# Patient Record
Sex: MALE | Race: WHITE | NOT HISPANIC OR LATINO | Employment: OTHER | URBAN - METROPOLITAN AREA
[De-identification: names, ages, dates, MRNs, and addresses within clinical notes are randomized per-mention and may not be internally consistent; named-entity substitution may affect disease eponyms.]

---

## 2017-03-20 ENCOUNTER — HOSPITAL ENCOUNTER (OUTPATIENT)
Dept: CARDIOLOGY | Facility: HOSPITAL | Age: 57
Discharge: HOME | End: 2017-03-20
Attending: INTERNAL MEDICINE | Admitting: INTERNAL MEDICINE

## 2017-03-22 ENCOUNTER — HOSPITAL ENCOUNTER (OUTPATIENT)
Dept: OTHER | Facility: HOSPITAL | Age: 57
Discharge: HOME | End: 2017-03-22
Attending: INTERNAL MEDICINE | Admitting: INTERNAL MEDICINE

## 2017-03-22 LAB
ANION GAP SERPL CALC-SCNC: 13.9 MMOL/L (ref 10–20)
APTT BLD: 24.3 SEC (ref 24–31)
BASOPHILS # BLD AUTO: 0 10*3/UL (ref 0–0.2)
BASOPHILS NFR BLD AUTO: 0 % (ref 0–2)
BUN SERPL-MCNC: 24 MG/DL (ref 8–20)
BUN/CREAT SERPL: 20 (ref 6.2–20.3)
CALCIUM SERPL-MCNC: 9.4 MG/DL (ref 8.9–10.3)
CHLORIDE SERPL-SCNC: 105 MMOL/L (ref 101–111)
CHOLEST SERPL-MCNC: 213 MG/DL
CHOLEST/HDLC SERPL: 6.1 {RATIO}
CONV CO2: 25 MMOL/L (ref 22–32)
CONV LDL CHOLESTEROL DIRECT: 133 MG/DL (ref 0–100)
CREAT UR-MCNC: 1.2 MG/DL (ref 0.7–1.2)
DIFFERENTIAL METHOD BLD: (no result)
EOSINOPHIL # BLD AUTO: 0.1 10*3/UL (ref 0–0.3)
EOSINOPHIL # BLD AUTO: 2 % (ref 0–3)
ERYTHROCYTE [DISTWIDTH] IN BLOOD BY AUTOMATED COUNT: 13 % (ref 11.5–14.5)
GLUCOSE SERPL-MCNC: 81 MG/DL (ref 65–99)
HCT VFR BLD AUTO: 40.1 % (ref 40–54)
HDLC SERPL-MCNC: 35 MG/DL
HGB BLD-MCNC: 13.6 G/DL (ref 14–18)
INR PPP: 1
LDLC/HDLC SERPL: 3.8 {RATIO}
LIPID INTERPRETATION: ABNORMAL
LYMPHOCYTES # BLD AUTO: 2.1 10*3/UL (ref 0.8–4.8)
LYMPHOCYTES NFR BLD AUTO: 32 % (ref 18–42)
MCH RBC QN AUTO: 30.5 PG (ref 26–32)
MCHC RBC AUTO-ENTMCNC: 33.9 G/DL (ref 32–36)
MCV RBC AUTO: 90 FL (ref 80–94)
MONOCYTES # BLD AUTO: 0.7 10*3/UL (ref 0.1–1.3)
MONOCYTES NFR BLD AUTO: 11 % (ref 2–11)
NEUTROPHILS # BLD AUTO: 3.5 10*3/UL (ref 2.3–8.6)
NEUTROPHILS NFR BLD AUTO: 55 % (ref 50–75)
NRBC BLD AUTO-RTO: 0 /100{WBCS}
NRBC/RBC NFR BLD MANUAL: 0 10*3/UL
PLATELET # BLD AUTO: 160 10*3/UL (ref 150–450)
PMV BLD AUTO: 8.9 FL (ref 7.4–10.4)
POTASSIUM SERPL-SCNC: 3.9 MMOL/L (ref 3.6–5.1)
PROTHROMBIN TIME: 10.9 SEC (ref 9.6–11.7)
RBC # BLD AUTO: 4.46 10*6/UL (ref 4.6–6)
SODIUM SERPL-SCNC: 140 MMOL/L (ref 136–144)
TRIGL SERPL-MCNC: 161 MG/DL
VLDLC SERPL CALC-MCNC: 45.1 MG/DL
WBC # BLD AUTO: 6.5 10*3/UL (ref 4.5–11.5)

## 2017-06-02 ENCOUNTER — HOSPITAL ENCOUNTER (OUTPATIENT)
Dept: CARDIOLOGY | Facility: HOSPITAL | Age: 57
Discharge: HOME | End: 2017-06-02
Attending: INTERNAL MEDICINE | Admitting: INTERNAL MEDICINE

## 2017-07-27 ENCOUNTER — HOSPITAL ENCOUNTER (OUTPATIENT)
Dept: CARDIAC REHAB | Facility: HOSPITAL | Age: 57
Setting detail: RECURRING SERIES
Discharge: HOME | End: 2017-09-26

## 2017-09-12 ENCOUNTER — HOSPITAL ENCOUNTER (OUTPATIENT)
Dept: CARDIOLOGY | Facility: HOSPITAL | Age: 57
Discharge: HOME | End: 2017-09-12
Attending: INTERNAL MEDICINE | Admitting: INTERNAL MEDICINE

## 2017-10-09 ENCOUNTER — HOSPITAL ENCOUNTER (OUTPATIENT)
Dept: MRI IMAGING | Facility: HOSPITAL | Age: 57
Discharge: HOME | End: 2017-10-09
Attending: FAMILY MEDICINE | Admitting: FAMILY MEDICINE

## 2017-10-30 ENCOUNTER — HOSPITAL ENCOUNTER (OUTPATIENT)
Dept: PAIN MEDICINE | Facility: HOSPITAL | Age: 57
Discharge: HOME | End: 2017-10-30
Attending: PAIN MEDICINE | Admitting: PAIN MEDICINE

## 2017-11-01 ENCOUNTER — HOSPITAL ENCOUNTER (OUTPATIENT)
Dept: GENERAL RADIOLOGY | Facility: HOSPITAL | Age: 57
Discharge: HOME | End: 2017-11-01
Attending: FAMILY MEDICINE | Admitting: FAMILY MEDICINE

## 2017-11-09 ENCOUNTER — HOSPITAL ENCOUNTER (OUTPATIENT)
Dept: MRI IMAGING | Facility: HOSPITAL | Age: 57
Discharge: HOME | End: 2017-11-09
Attending: FAMILY MEDICINE | Admitting: FAMILY MEDICINE

## 2017-11-13 ENCOUNTER — HOSPITAL ENCOUNTER (OUTPATIENT)
Dept: PAIN MEDICINE | Facility: HOSPITAL | Age: 57
Discharge: HOME | End: 2017-11-13
Attending: PAIN MEDICINE | Admitting: PAIN MEDICINE

## 2017-12-06 ENCOUNTER — HOSPITAL ENCOUNTER (OUTPATIENT)
Dept: LAB | Facility: HOSPITAL | Age: 57
Discharge: HOME | End: 2017-12-06
Attending: INTERNAL MEDICINE | Admitting: INTERNAL MEDICINE

## 2017-12-06 LAB
ANION GAP SERPL CALC-SCNC: 10.3 MMOL/L (ref 10–20)
BUN SERPL-MCNC: 21 MG/DL (ref 8–20)
BUN/CREAT SERPL: 17.5 (ref 6.2–20.3)
CALCIUM SERPL-MCNC: 9.3 MG/DL (ref 8.9–10.3)
CHLORIDE SERPL-SCNC: 108 MMOL/L (ref 101–111)
CONV CO2: 25 MMOL/L (ref 22–32)
CREAT UR-MCNC: 1.2 MG/DL (ref 0.7–1.2)
GLUCOSE SERPL-MCNC: 108 MG/DL (ref 65–99)
POTASSIUM SERPL-SCNC: 4.3 MMOL/L (ref 3.6–5.1)
SODIUM SERPL-SCNC: 139 MMOL/L (ref 136–144)

## 2018-02-14 ENCOUNTER — HOSPITAL ENCOUNTER (OUTPATIENT)
Dept: LAB | Facility: HOSPITAL | Age: 58
Discharge: HOME | End: 2018-02-14
Attending: NURSE PRACTITIONER | Admitting: NURSE PRACTITIONER

## 2018-02-14 LAB
ANION GAP SERPL CALC-SCNC: 12.8 MMOL/L (ref 10–20)
BUN SERPL-MCNC: 19 MG/DL (ref 8–20)
BUN/CREAT SERPL: 17.3 (ref 6.2–20.3)
CALCIUM SERPL-MCNC: 9.7 MG/DL (ref 8.9–10.3)
CHLORIDE SERPL-SCNC: 103 MMOL/L (ref 101–111)
CONV CO2: 27 MMOL/L (ref 22–32)
CREAT UR-MCNC: 1.1 MG/DL (ref 0.7–1.2)
GLUCOSE SERPL-MCNC: 127 MG/DL (ref 65–99)
POTASSIUM SERPL-SCNC: 3.8 MMOL/L (ref 3.6–5.1)
SODIUM SERPL-SCNC: 139 MMOL/L (ref 136–144)

## 2018-02-21 ENCOUNTER — HOSPITAL ENCOUNTER (OUTPATIENT)
Dept: LAB | Facility: HOSPITAL | Age: 58
Setting detail: SPECIMEN
Discharge: HOME | End: 2018-02-21
Attending: NURSE PRACTITIONER | Admitting: NURSE PRACTITIONER

## 2018-02-21 LAB
ANION GAP SERPL CALC-SCNC: 10.9 MMOL/L (ref 10–20)
BUN SERPL-MCNC: 16 MG/DL (ref 8–20)
BUN/CREAT SERPL: 12.3 (ref 6.2–20.3)
CALCIUM SERPL-MCNC: 9.4 MG/DL (ref 8.9–10.3)
CHLORIDE SERPL-SCNC: 104 MMOL/L (ref 101–111)
CONV CO2: 28 MMOL/L (ref 22–32)
CREAT UR-MCNC: 1.3 MG/DL (ref 0.7–1.2)
GLUCOSE SERPL-MCNC: 159 MG/DL (ref 65–99)
POTASSIUM SERPL-SCNC: 3.9 MMOL/L (ref 3.6–5.1)
SODIUM SERPL-SCNC: 139 MMOL/L (ref 136–144)

## 2018-03-07 ENCOUNTER — HOSPITAL ENCOUNTER (OUTPATIENT)
Dept: LAB | Facility: HOSPITAL | Age: 58
Setting detail: SPECIMEN
Discharge: HOME | End: 2018-03-07
Attending: NURSE PRACTITIONER | Admitting: NURSE PRACTITIONER

## 2018-03-07 LAB
ANION GAP SERPL CALC-SCNC: 9.7 MMOL/L (ref 10–20)
BUN SERPL-MCNC: 24 MG/DL (ref 8–20)
BUN/CREAT SERPL: 20 (ref 6.2–20.3)
CALCIUM SERPL-MCNC: 9.2 MG/DL (ref 8.9–10.3)
CHLORIDE SERPL-SCNC: 101 MMOL/L (ref 101–111)
CONV CO2: 29 MMOL/L (ref 22–32)
CREAT UR-MCNC: 1.2 MG/DL (ref 0.7–1.2)
GLUCOSE SERPL-MCNC: 137 MG/DL (ref 65–99)
POTASSIUM SERPL-SCNC: 3.7 MMOL/L (ref 3.6–5.1)
SODIUM SERPL-SCNC: 136 MMOL/L (ref 136–144)

## 2018-04-05 ENCOUNTER — HOSPITAL ENCOUNTER (OUTPATIENT)
Dept: LAB | Facility: HOSPITAL | Age: 58
Setting detail: SPECIMEN
Discharge: HOME | End: 2018-04-05
Attending: NURSE PRACTITIONER | Admitting: NURSE PRACTITIONER

## 2018-04-05 LAB
ANION GAP SERPL CALC-SCNC: 13.2 MMOL/L (ref 10–20)
BUN SERPL-MCNC: 15 MG/DL (ref 8–20)
BUN/CREAT SERPL: 15 (ref 6.2–20.3)
CALCIUM SERPL-MCNC: 9.4 MG/DL (ref 8.9–10.3)
CHLORIDE SERPL-SCNC: 102 MMOL/L (ref 101–111)
CONV CO2: 25 MMOL/L (ref 22–32)
CREAT UR-MCNC: 1 MG/DL (ref 0.7–1.2)
GLUCOSE SERPL-MCNC: 105 MG/DL (ref 65–99)
POTASSIUM SERPL-SCNC: 4.2 MMOL/L (ref 3.6–5.1)
SODIUM SERPL-SCNC: 136 MMOL/L (ref 136–144)

## 2018-04-18 ENCOUNTER — HOSPITAL ENCOUNTER (OUTPATIENT)
Dept: FAMILY MEDICINE CLINIC | Facility: CLINIC | Age: 58
Setting detail: SPECIMEN
Discharge: HOME | End: 2018-04-18
Attending: FAMILY MEDICINE | Admitting: FAMILY MEDICINE

## 2018-04-18 LAB
ALBUMIN SERPL-MCNC: 4.3 G/DL (ref 3.5–4.8)
ALBUMIN/GLOB SERPL: 1.7 {RATIO} (ref 1–1.7)
ALP SERPL-CCNC: 50 IU/L (ref 32–91)
ALT SERPL-CCNC: 21 IU/L (ref 17–63)
AMYLASE SERPL-CCNC: 46 U/L (ref 36–128)
ANION GAP SERPL CALC-SCNC: 11.8 MMOL/L (ref 10–20)
AST SERPL-CCNC: 28 IU/L (ref 15–41)
BASOPHILS # BLD AUTO: 0 10*3/UL (ref 0–0.2)
BASOPHILS NFR BLD AUTO: 1 % (ref 0–2)
BILIRUB SERPL-MCNC: 0.6 MG/DL (ref 0.3–1.2)
BILIRUB UR QL STRIP: NEGATIVE MG/DL
BUN SERPL-MCNC: 18 MG/DL (ref 8–20)
BUN/CREAT SERPL: 15 (ref 6.2–20.3)
CALCIUM SERPL-MCNC: 9 MG/DL (ref 8.9–10.3)
CASTS URNS QL MICRO: NORMAL /[LPF]
CHLORIDE SERPL-SCNC: 104 MMOL/L (ref 101–111)
CHOLEST SERPL-MCNC: 210 MG/DL
CHOLEST/HDLC SERPL: 6.6 {RATIO}
COLOR UR: YELLOW
CONV BACTERIA IN URINE MICRO: NEGATIVE
CONV CLARITY OF URINE: CLEAR
CONV CO2: 25 MMOL/L (ref 22–32)
CONV HYALINE CASTS IN URINE MICRO: 2 /[LPF] (ref 0–5)
CONV LDL CHOLESTEROL DIRECT: 139 MG/DL (ref 0–100)
CONV PROTEIN IN URINE BY AUTOMATED TEST STRIP: NEGATIVE MG/DL
CONV SMALL ROUND CELLS: NORMAL /[HPF]
CONV TOTAL PROTEIN: 6.9 G/DL (ref 6.1–7.9)
CONV UROBILINOGEN IN URINE BY AUTOMATED TEST STRIP: 0.2 MG/DL
CREAT UR-MCNC: 1.2 MG/DL (ref 0.7–1.2)
CULTURE INDICATED?: NORMAL
DIFFERENTIAL METHOD BLD: (no result)
EOSINOPHIL # BLD AUTO: 0.1 10*3/UL (ref 0–0.3)
EOSINOPHIL # BLD AUTO: 2 % (ref 0–3)
ERYTHROCYTE [DISTWIDTH] IN BLOOD BY AUTOMATED COUNT: 12.9 % (ref 11.5–14.5)
ERYTHROCYTE [SEDIMENTATION RATE] IN BLOOD BY WESTERGREN METHOD: 13 MM/HR (ref 0–20)
GLOBULIN UR ELPH-MCNC: 2.6 G/DL (ref 2.5–3.8)
GLUCOSE SERPL-MCNC: 118 MG/DL (ref 65–99)
GLUCOSE UR QL: NEGATIVE MG/DL
HCT VFR BLD AUTO: 39.5 % (ref 40–54)
HDLC SERPL-MCNC: 32 MG/DL
HGB BLD-MCNC: 13.4 G/DL (ref 14–18)
HGB UR QL STRIP: NEGATIVE
KETONES UR QL STRIP: NEGATIVE MG/DL
LDLC/HDLC SERPL: 4.4 {RATIO}
LEUKOCYTE ESTERASE UR QL STRIP: NEGATIVE
LIPASE SERPL-CCNC: 27 U/L (ref 22–51)
LIPID INTERPRETATION: ABNORMAL
LYMPHOCYTES # BLD AUTO: 2 10*3/UL (ref 0.8–4.8)
LYMPHOCYTES NFR BLD AUTO: 35 % (ref 18–42)
MCH RBC QN AUTO: 30.7 PG (ref 26–32)
MCHC RBC AUTO-ENTMCNC: 33.8 G/DL (ref 32–36)
MCV RBC AUTO: 90.7 FL (ref 80–94)
MONOCYTES # BLD AUTO: 0.8 10*3/UL (ref 0.1–1.3)
MONOCYTES NFR BLD AUTO: 14 % (ref 2–11)
NEUTROPHILS # BLD AUTO: 2.7 10*3/UL (ref 2.3–8.6)
NEUTROPHILS NFR BLD AUTO: 48 % (ref 50–75)
NITRITE UR QL STRIP: NEGATIVE
NRBC BLD AUTO-RTO: 0 /100{WBCS}
NRBC/RBC NFR BLD MANUAL: 0 10*3/UL
PH UR STRIP.AUTO: 5.5 [PH] (ref 4.5–8)
PLATELET # BLD AUTO: 189 10*3/UL (ref 150–450)
PMV BLD AUTO: 8.6 FL (ref 7.4–10.4)
POTASSIUM SERPL-SCNC: 3.8 MMOL/L (ref 3.6–5.1)
RBC # BLD AUTO: 4.36 10*6/UL (ref 4.6–6)
RBC #/AREA URNS HPF: 0 /[HPF] (ref 0–3)
SODIUM SERPL-SCNC: 137 MMOL/L (ref 136–144)
SP GR UR: 1.02 (ref 1–1.03)
SPERM URNS QL MICRO: NORMAL /[HPF]
SQUAMOUS SPT QL MICRO: 0 /[HPF] (ref 0–5)
TRIGL SERPL-MCNC: 151 MG/DL
UNIDENT CRYS URNS QL MICRO: NORMAL /[HPF]
VLDLC SERPL CALC-MCNC: 39.1 MG/DL
WBC # BLD AUTO: 5.7 10*3/UL (ref 4.5–11.5)
WBC #/AREA URNS HPF: 0 /[HPF] (ref 0–5)
YEAST SPEC QL WET PREP: NORMAL /[HPF]

## 2018-04-21 ENCOUNTER — HOSPITAL ENCOUNTER (OUTPATIENT)
Dept: ULTRASOUND IMAGING | Facility: HOSPITAL | Age: 58
Discharge: HOME | End: 2018-04-21
Attending: FAMILY MEDICINE | Admitting: FAMILY MEDICINE

## 2018-06-07 ENCOUNTER — HOSPITAL ENCOUNTER (OUTPATIENT)
Dept: CARDIOLOGY | Facility: HOSPITAL | Age: 58
Discharge: HOME | End: 2018-06-07
Attending: INTERNAL MEDICINE | Admitting: INTERNAL MEDICINE

## 2018-06-07 LAB
ANION GAP SERPL CALC-SCNC: 12.1 MMOL/L (ref 10–20)
BUN SERPL-MCNC: 20 MG/DL (ref 8–20)
BUN/CREAT SERPL: 15.4 (ref 6.2–20.3)
CALCIUM SERPL-MCNC: 9.4 MG/DL (ref 8.9–10.3)
CHLORIDE SERPL-SCNC: 104 MMOL/L (ref 101–111)
CONV CO2: 26 MMOL/L (ref 22–32)
CREAT UR-MCNC: 1.3 MG/DL (ref 0.7–1.2)
GLUCOSE SERPL-MCNC: 101 MG/DL (ref 65–99)
POTASSIUM SERPL-SCNC: 4.1 MMOL/L (ref 3.6–5.1)
SODIUM SERPL-SCNC: 138 MMOL/L (ref 136–144)

## 2018-06-11 ENCOUNTER — HOSPITAL ENCOUNTER (OUTPATIENT)
Dept: LAB | Facility: HOSPITAL | Age: 58
Discharge: HOME | End: 2018-06-11
Attending: INTERNAL MEDICINE | Admitting: INTERNAL MEDICINE

## 2018-06-11 LAB
ALBUMIN SERPL-MCNC: 4 G/DL (ref 3.5–4.8)
ALBUMIN/GLOB SERPL: 1.4 {RATIO} (ref 1–1.7)
ALP SERPL-CCNC: 55 IU/L (ref 32–91)
ALT SERPL-CCNC: 15 IU/L (ref 17–63)
ANION GAP SERPL CALC-SCNC: 12.9 MMOL/L (ref 10–20)
AST SERPL-CCNC: 19 IU/L (ref 15–41)
BILIRUB SERPL-MCNC: 0.5 MG/DL (ref 0.3–1.2)
BUN SERPL-MCNC: 12 MG/DL (ref 8–20)
BUN/CREAT SERPL: 10.9 (ref 6.2–20.3)
CALCIUM SERPL-MCNC: 9.3 MG/DL (ref 8.9–10.3)
CHLORIDE SERPL-SCNC: 100 MMOL/L (ref 101–111)
CK SERPL-CCNC: 40 IU/L (ref 49–397)
CONV CO2: 28 MMOL/L (ref 22–32)
CONV TOTAL PROTEIN: 6.9 G/DL (ref 6.1–7.9)
CREAT UR-MCNC: 1.1 MG/DL (ref 0.7–1.2)
GLOBULIN UR ELPH-MCNC: 2.9 G/DL (ref 2.5–3.8)
GLUCOSE SERPL-MCNC: 106 MG/DL (ref 65–99)
MAGNESIUM SERPL-MCNC: 1.9 MG/DL (ref 1.8–2.5)
POTASSIUM SERPL-SCNC: 3.9 MMOL/L (ref 3.6–5.1)
SODIUM SERPL-SCNC: 137 MMOL/L (ref 136–144)

## 2018-08-01 ENCOUNTER — HOSPITAL ENCOUNTER (OUTPATIENT)
Dept: LAB | Facility: HOSPITAL | Age: 58
Discharge: HOME | End: 2018-08-01
Attending: INTERNAL MEDICINE | Admitting: INTERNAL MEDICINE

## 2018-08-01 LAB
ANION GAP SERPL CALC-SCNC: 11.9 MMOL/L (ref 10–20)
BUN SERPL-MCNC: 14 MG/DL (ref 8–20)
BUN/CREAT SERPL: 12.7 (ref 6.2–20.3)
CALCIUM SERPL-MCNC: 9.1 MG/DL (ref 8.9–10.3)
CHLORIDE SERPL-SCNC: 104 MMOL/L (ref 101–111)
CONV CO2: 28 MMOL/L (ref 22–32)
CREAT UR-MCNC: 1.1 MG/DL (ref 0.7–1.2)
GLUCOSE SERPL-MCNC: ABNORMAL MG/DL
GLUCOSE SERPL-MCNC: ABNORMAL MG/DL (ref 65–99)
POTASSIUM SERPL-SCNC: 3.9 MMOL/L (ref 3.6–5.1)
SODIUM SERPL-SCNC: 140 MMOL/L (ref 136–144)

## 2018-08-16 ENCOUNTER — HOSPITAL ENCOUNTER (OUTPATIENT)
Dept: LAB | Facility: HOSPITAL | Age: 58
Setting detail: SPECIMEN
Discharge: HOME | End: 2018-08-16
Attending: INTERNAL MEDICINE | Admitting: INTERNAL MEDICINE

## 2018-08-16 LAB
ANION GAP SERPL CALC-SCNC: 11.7 MMOL/L (ref 10–20)
BUN SERPL-MCNC: 16 MG/DL (ref 8–20)
BUN/CREAT SERPL: 13.3 (ref 6.2–20.3)
CALCIUM SERPL-MCNC: 9.1 MG/DL (ref 8.9–10.3)
CHLORIDE SERPL-SCNC: 103 MMOL/L (ref 101–111)
CONV CO2: 25 MMOL/L (ref 22–32)
CREAT UR-MCNC: 1.2 MG/DL (ref 0.7–1.2)
GLUCOSE SERPL-MCNC: 109 MG/DL (ref 65–99)
POTASSIUM SERPL-SCNC: 3.7 MMOL/L (ref 3.6–5.1)
SODIUM SERPL-SCNC: 136 MMOL/L (ref 136–144)

## 2018-08-23 ENCOUNTER — HOSPITAL ENCOUNTER (OUTPATIENT)
Dept: LAB | Facility: HOSPITAL | Age: 58
Setting detail: SPECIMEN
Discharge: HOME | End: 2018-08-23
Attending: INTERNAL MEDICINE | Admitting: INTERNAL MEDICINE

## 2018-08-23 LAB
ANION GAP SERPL CALC-SCNC: 8.8 MMOL/L (ref 10–20)
BUN SERPL-MCNC: 11 MG/DL (ref 8–20)
BUN/CREAT SERPL: 11 (ref 6.2–20.3)
CALCIUM SERPL-MCNC: 9 MG/DL (ref 8.9–10.3)
CHLORIDE SERPL-SCNC: 106 MMOL/L (ref 101–111)
CONV CO2: 26 MMOL/L (ref 22–32)
CREAT UR-MCNC: 1 MG/DL (ref 0.7–1.2)
GLUCOSE SERPL-MCNC: 117 MG/DL (ref 65–99)
POTASSIUM SERPL-SCNC: 3.8 MMOL/L (ref 3.6–5.1)
SODIUM SERPL-SCNC: 137 MMOL/L (ref 136–144)

## 2018-12-06 ENCOUNTER — HOSPITAL ENCOUNTER (OUTPATIENT)
Dept: CARDIOLOGY | Facility: HOSPITAL | Age: 58
Discharge: HOME | End: 2018-12-06
Attending: INTERNAL MEDICINE | Admitting: INTERNAL MEDICINE

## 2019-02-08 ENCOUNTER — HOSPITAL ENCOUNTER (OUTPATIENT)
Dept: NUCLEAR MEDICINE | Facility: HOSPITAL | Age: 59
Discharge: HOME | End: 2019-02-08
Attending: FAMILY MEDICINE | Admitting: FAMILY MEDICINE

## 2019-04-08 ENCOUNTER — HOSPITAL ENCOUNTER (OUTPATIENT)
Dept: FAMILY MEDICINE CLINIC | Facility: CLINIC | Age: 59
Setting detail: SPECIMEN
Discharge: HOME | End: 2019-04-08
Attending: FAMILY MEDICINE | Admitting: FAMILY MEDICINE

## 2019-04-08 LAB
ALBUMIN SERPL-MCNC: 4.3 G/DL (ref 3.5–4.8)
ALBUMIN/GLOB SERPL: 1.6 {RATIO} (ref 1–1.7)
ALP SERPL-CCNC: 44 IU/L (ref 32–91)
ALT SERPL-CCNC: 26 IU/L (ref 17–63)
ANION GAP SERPL CALC-SCNC: 17 MMOL/L (ref 10–20)
AST SERPL-CCNC: 28 IU/L (ref 15–41)
BILIRUB SERPL-MCNC: 0.9 MG/DL (ref 0.3–1.2)
BILIRUB UR QL STRIP: NEGATIVE MG/DL
BUN SERPL-MCNC: 16 MG/DL (ref 8–20)
BUN/CREAT SERPL: 14.5 (ref 6.2–20.3)
CALCIUM SERPL-MCNC: 9.4 MG/DL (ref 8.9–10.3)
CASTS URNS QL MICRO: NORMAL /[LPF]
CHLORIDE SERPL-SCNC: 105 MMOL/L (ref 101–111)
COLOR UR: YELLOW
CONV BACTERIA IN URINE MICRO: NEGATIVE
CONV CLARITY OF URINE: CLEAR
CONV CO2: 22 MMOL/L (ref 22–32)
CONV HYALINE CASTS IN URINE MICRO: 0 /[LPF] (ref 0–5)
CONV PROTEIN IN URINE BY AUTOMATED TEST STRIP: NEGATIVE MG/DL
CONV SMALL ROUND CELLS: NORMAL /[HPF]
CONV TOTAL PROTEIN: 7 G/DL (ref 6.1–7.9)
CONV UROBILINOGEN IN URINE BY AUTOMATED TEST STRIP: 0.2 MG/DL
CREAT UR-MCNC: 1.1 MG/DL (ref 0.7–1.2)
CULTURE INDICATED?: NORMAL
GLOBULIN UR ELPH-MCNC: 2.7 G/DL (ref 2.5–3.8)
GLUCOSE SERPL-MCNC: 102 MG/DL (ref 65–99)
GLUCOSE UR QL: NEGATIVE MG/DL
HGB UR QL STRIP: NEGATIVE
KETONES UR QL STRIP: NEGATIVE MG/DL
LEUKOCYTE ESTERASE UR QL STRIP: NEGATIVE
MAGNESIUM SERPL-MCNC: 2.1 MG/DL (ref 1.8–2.5)
NITRITE UR QL STRIP: NEGATIVE
PH UR STRIP.AUTO: 6.5 [PH] (ref 4.5–8)
POTASSIUM SERPL-SCNC: 4 MMOL/L (ref 3.6–5.1)
RBC #/AREA URNS HPF: 0 /[HPF] (ref 0–3)
SODIUM SERPL-SCNC: 140 MMOL/L (ref 136–144)
SP GR UR: 1.01 (ref 1–1.03)
SPERM URNS QL MICRO: NORMAL /[HPF]
SQUAMOUS SPT QL MICRO: 0 /[HPF] (ref 0–5)
UNIDENT CRYS URNS QL MICRO: NORMAL /[HPF]
WBC #/AREA URNS HPF: 0 /[HPF] (ref 0–5)
YEAST SPEC QL WET PREP: NORMAL /[HPF]

## 2019-10-02 ENCOUNTER — FLU SHOT (OUTPATIENT)
Dept: FAMILY MEDICINE CLINIC | Facility: CLINIC | Age: 59
End: 2019-10-02

## 2019-10-02 DIAGNOSIS — Z23 IMMUNIZATION DUE: Primary | ICD-10-CM

## 2019-10-02 PROCEDURE — G0008 ADMIN INFLUENZA VIRUS VAC: HCPCS | Performed by: FAMILY MEDICINE

## 2019-10-02 PROCEDURE — 90674 CCIIV4 VAC NO PRSV 0.5 ML IM: CPT | Performed by: FAMILY MEDICINE

## 2019-11-21 ENCOUNTER — APPOINTMENT (OUTPATIENT)
Dept: GENERAL RADIOLOGY | Facility: HOSPITAL | Age: 59
End: 2019-11-21

## 2019-11-21 ENCOUNTER — HOSPITAL ENCOUNTER (EMERGENCY)
Facility: HOSPITAL | Age: 59
Discharge: HOME | End: 2019-11-21
Attending: EMERGENCY MEDICINE | Admitting: EMERGENCY MEDICINE

## 2019-11-21 VITALS
RESPIRATION RATE: 17 BRPM | TEMPERATURE: 98.6 F | BODY MASS INDEX: 32.59 KG/M2 | HEIGHT: 69 IN | SYSTOLIC BLOOD PRESSURE: 107 MMHG | HEART RATE: 66 BPM | DIASTOLIC BLOOD PRESSURE: 60 MMHG | OXYGEN SATURATION: 98 % | WEIGHT: 220.02 LBS

## 2019-11-21 DIAGNOSIS — M70.32 BURSITIS OF LEFT ELBOW, UNSPECIFIED BURSA: Primary | ICD-10-CM

## 2019-11-21 LAB
ANION GAP SERPL CALCULATED.3IONS-SCNC: 9 MMOL/L (ref 5–15)
BASOPHILS # BLD AUTO: 0 10*3/MM3 (ref 0–0.2)
BASOPHILS NFR BLD AUTO: 0.4 % (ref 0–1.5)
BUN BLD-MCNC: 21 MG/DL (ref 6–20)
BUN/CREAT SERPL: 16.3 (ref 7–25)
CALCIUM SPEC-SCNC: 9 MG/DL (ref 8.6–10.5)
CHLORIDE SERPL-SCNC: 106 MMOL/L (ref 98–107)
CO2 SERPL-SCNC: 27 MMOL/L (ref 22–29)
CREAT BLD-MCNC: 1.29 MG/DL (ref 0.76–1.27)
DEPRECATED RDW RBC AUTO: 40.7 FL (ref 37–54)
EOSINOPHIL # BLD AUTO: 0.1 10*3/MM3 (ref 0–0.4)
EOSINOPHIL NFR BLD AUTO: 1 % (ref 0.3–6.2)
ERYTHROCYTE [DISTWIDTH] IN BLOOD BY AUTOMATED COUNT: 12.6 % (ref 12.3–15.4)
ERYTHROCYTE [SEDIMENTATION RATE] IN BLOOD: 49 MM/HR (ref 0–20)
GFR SERPL CREATININE-BSD FRML MDRD: 57 ML/MIN/1.73
GLUCOSE BLD-MCNC: 103 MG/DL (ref 65–99)
HCT VFR BLD AUTO: 33.3 % (ref 37.5–51)
HGB BLD-MCNC: 11.8 G/DL (ref 13–17.7)
LYMPHOCYTES # BLD AUTO: 1.7 10*3/MM3 (ref 0.7–3.1)
LYMPHOCYTES NFR BLD AUTO: 26.5 % (ref 19.6–45.3)
MCH RBC QN AUTO: 32.3 PG (ref 26.6–33)
MCHC RBC AUTO-ENTMCNC: 35.5 G/DL (ref 31.5–35.7)
MCV RBC AUTO: 90.9 FL (ref 79–97)
MONOCYTES # BLD AUTO: 0.9 10*3/MM3 (ref 0.1–0.9)
MONOCYTES NFR BLD AUTO: 13.2 % (ref 5–12)
NEUTROPHILS # BLD AUTO: 3.8 10*3/MM3 (ref 1.7–7)
NEUTROPHILS NFR BLD AUTO: 58.9 % (ref 42.7–76)
NRBC BLD AUTO-RTO: 0.2 /100 WBC (ref 0–0.2)
PLATELET # BLD AUTO: 170 10*3/MM3 (ref 140–450)
PMV BLD AUTO: 7.7 FL (ref 6–12)
POTASSIUM BLD-SCNC: 4.1 MMOL/L (ref 3.5–5.2)
RBC # BLD AUTO: 3.66 10*6/MM3 (ref 4.14–5.8)
SODIUM BLD-SCNC: 142 MMOL/L (ref 136–145)
WBC NRBC COR # BLD: 6.5 10*3/MM3 (ref 3.4–10.8)

## 2019-11-21 PROCEDURE — 85025 COMPLETE CBC W/AUTO DIFF WBC: CPT | Performed by: EMERGENCY MEDICINE

## 2019-11-21 PROCEDURE — 80048 BASIC METABOLIC PNL TOTAL CA: CPT | Performed by: EMERGENCY MEDICINE

## 2019-11-21 PROCEDURE — 85652 RBC SED RATE AUTOMATED: CPT | Performed by: EMERGENCY MEDICINE

## 2019-11-21 PROCEDURE — 63710000001 PREDNISONE PER 5 MG: Performed by: EMERGENCY MEDICINE

## 2019-11-21 PROCEDURE — 73070 X-RAY EXAM OF ELBOW: CPT

## 2019-11-21 PROCEDURE — 99283 EMERGENCY DEPT VISIT LOW MDM: CPT

## 2019-11-21 RX ORDER — METHYLPREDNISOLONE 4 MG/1
TABLET ORAL
Qty: 1 EACH | Refills: 0 | Status: SHIPPED | OUTPATIENT
Start: 2019-11-21 | End: 2019-12-12

## 2019-11-21 RX ORDER — CEPHALEXIN 500 MG/1
500 CAPSULE ORAL 4 TIMES DAILY
Qty: 28 CAPSULE | Refills: 0 | Status: SHIPPED | OUTPATIENT
Start: 2019-11-21 | End: 2019-12-12

## 2019-11-21 RX ADMIN — PREDNISONE 60 MG: 10 TABLET ORAL at 19:24

## 2019-11-22 NOTE — ED PROVIDER NOTES
Subjective   History of Present Illness  59-year-old presents with complaints of pain to her left elbow.  He has been having some pain for about 1 to 2 weeks.  He has had no fever.  He had been doing yard work prior to his elbow hurting.  He has increased pain with movement.  Review of Systems  Negative for fever chest pain shortness of breath diabetes  No past medical history on file.  Cardiomyopathy, transposition of great vessels, hypertension  Allergies   Allergen Reactions   • Hydrocodone Urinary Retention       No past surgical history on file.    No family history on file.    Social History     Socioeconomic History   • Marital status:      Spouse name: Not on file   • Number of children: Not on file   • Years of education: Not on file   • Highest education level: Not on file     Medications have included Entresto Lasix Coreg      Objective   Physical Exam  59-year-old male awake alert.  Generally well-developed well-nourished.  Examining the left arm he has warmth tenderness over posterior elbow.  He does not have significant increased pain with pronation supination.  He does have increased pain with flexion extension.  He is neurovascular intact distally.  Chest clear equal breath sounds.  Cardiovascular regular rate and rhythm.  He has no complaints of other joint pain.  Procedures           ED Course        Results for orders placed or performed during the hospital encounter of 11/21/19   Basic Metabolic Panel   Result Value Ref Range    Glucose 103 (H) 65 - 99 mg/dL    BUN 21 (H) 6 - 20 mg/dL    Creatinine 1.29 (H) 0.76 - 1.27 mg/dL    Sodium 142 136 - 145 mmol/L    Potassium 4.1 3.5 - 5.2 mmol/L    Chloride 106 98 - 107 mmol/L    CO2 27.0 22.0 - 29.0 mmol/L    Calcium 9.0 8.6 - 10.5 mg/dL    eGFR Non African Amer 57 (L) >60 mL/min/1.73    BUN/Creatinine Ratio 16.3 7.0 - 25.0    Anion Gap 9.0 5.0 - 15.0 mmol/L   Sedimentation Rate   Result Value Ref Range    Sed Rate 49 (H) 0 - 20 mm/hr   CBC Auto  "Differential   Result Value Ref Range    WBC 6.50 3.40 - 10.80 10*3/mm3    RBC 3.66 (L) 4.14 - 5.80 10*6/mm3    Hemoglobin 11.8 (L) 13.0 - 17.7 g/dL    Hematocrit 33.3 (L) 37.5 - 51.0 %    MCV 90.9 79.0 - 97.0 fL    MCH 32.3 26.6 - 33.0 pg    MCHC 35.5 31.5 - 35.7 g/dL    RDW 12.6 12.3 - 15.4 %    RDW-SD 40.7 37.0 - 54.0 fl    MPV 7.7 6.0 - 12.0 fL    Platelets 170 140 - 450 10*3/mm3    Neutrophil % 58.9 42.7 - 76.0 %    Lymphocyte % 26.5 19.6 - 45.3 %    Monocyte % 13.2 (H) 5.0 - 12.0 %    Eosinophil % 1.0 0.3 - 6.2 %    Basophil % 0.4 0.0 - 1.5 %    Neutrophils, Absolute 3.80 1.70 - 7.00 10*3/mm3    Lymphocytes, Absolute 1.70 0.70 - 3.10 10*3/mm3    Monocytes, Absolute 0.90 0.10 - 0.90 10*3/mm3    Eosinophils, Absolute 0.10 0.00 - 0.40 10*3/mm3    Basophils, Absolute 0.00 0.00 - 0.20 10*3/mm3    nRBC 0.2 0.0 - 0.2 /100 WBC     Xr Elbow 2 View Left    Result Date: 11/21/2019  1. Soft tissue swelling, mainly posteriorly and medially. 2. Proximal ulnar spurring.  Electronically Signed By-Mary Lou Alcantara On:11/21/2019 7:24 PM This report was finalized on [id number] by  Mary Lou Alcantara, .    Medications   predniSONE (DELTASONE) tablet 60 mg (60 mg Oral Given 11/21/19 1924)     /88 (BP Location: Left arm, Patient Position: Sitting)   Pulse 72   Temp 98.5 °F (36.9 °C) (Oral)   Resp 14   Ht 175.3 cm (69\")   Wt 99.8 kg (220 lb 0.3 oz)   SpO2 98%   BMI 32.49 kg/m²             [facility]  Chart review:   Comorbidity: As per past history  Differential: Cellulitis, bursitis, tendinitis  My EKG interpretation:   Lab: Normal white count and differential, monocytes 13%, basic metabolic panel glucose 103, sed rate elevated 49  Radiology: X-ray of left elbow.  Soft tissue swelling without bony abnormality  Discussion/treatment: She was given dose of prednisone.  Findings were discussed with him and wife.  This appears most consistent with a bursitis.  I do not feel fluid to try to draw off at this time.  He was discharged.  He " was placed on Medrol Dosepak.  He was placed on Keflex.  Advised to follow-up with PMD or orthopedist for recheck 2 to 3 days if not improved return new or worsening symptoms    Final diagnoses:   Bursitis of left elbow, unspecified bursa              Randell Joe MD  11/21/19 2032

## 2019-12-11 PROBLEM — R94.39 ABNORMAL CARDIOVASCULAR STRESS TEST: Status: ACTIVE | Noted: 2017-03-21

## 2019-12-11 PROBLEM — Q24.9 CONGENITAL HEART DISEASE: Status: ACTIVE | Noted: 2017-03-02

## 2019-12-11 PROBLEM — I42.9 CARDIOMYOPATHY (HCC): Status: ACTIVE | Noted: 2019-12-11

## 2019-12-11 PROBLEM — I10 HYPERTENSION: Status: ACTIVE | Noted: 2017-03-02

## 2019-12-11 RX ORDER — CARVEDILOL 6.25 MG/1
6.25 TABLET ORAL DAILY
COMMUNITY
Start: 2019-09-07

## 2019-12-11 RX ORDER — FUROSEMIDE 40 MG/1
40 TABLET ORAL DAILY
COMMUNITY
Start: 2019-09-07 | End: 2022-04-26

## 2019-12-11 RX ORDER — ASPIRIN 81 MG/1
81 TABLET ORAL DAILY
COMMUNITY
Start: 2017-12-04

## 2019-12-11 RX ORDER — SACUBITRIL AND VALSARTAN 49; 51 MG/1; MG/1
1 TABLET, FILM COATED ORAL 2 TIMES DAILY
COMMUNITY
Start: 2019-09-09

## 2019-12-12 ENCOUNTER — OFFICE VISIT (OUTPATIENT)
Dept: CARDIOLOGY | Facility: CLINIC | Age: 59
End: 2019-12-12

## 2019-12-12 VITALS
DIASTOLIC BLOOD PRESSURE: 78 MMHG | WEIGHT: 216 LBS | HEART RATE: 62 BPM | OXYGEN SATURATION: 96 % | BODY MASS INDEX: 31.99 KG/M2 | HEIGHT: 69 IN | SYSTOLIC BLOOD PRESSURE: 108 MMHG

## 2019-12-12 DIAGNOSIS — I50.22 CHRONIC SYSTOLIC CONGESTIVE HEART FAILURE (HCC): Primary | ICD-10-CM

## 2019-12-12 DIAGNOSIS — I42.9 CARDIOMYOPATHY, UNSPECIFIED TYPE (HCC): ICD-10-CM

## 2019-12-12 DIAGNOSIS — I10 ESSENTIAL HYPERTENSION: ICD-10-CM

## 2019-12-12 DIAGNOSIS — Q24.9 CONGENITAL HEART DISEASE: ICD-10-CM

## 2019-12-12 DIAGNOSIS — E78.00 PURE HYPERCHOLESTEROLEMIA: ICD-10-CM

## 2019-12-12 PROCEDURE — 99214 OFFICE O/P EST MOD 30 MIN: CPT | Performed by: INTERNAL MEDICINE

## 2019-12-12 NOTE — PROGRESS NOTES
"    Subjective:     Encounter Date:12/12/2019      Patient ID: Jens Jean-Baptiste is a 59 y.o. male.    Chief Complaint:  History of Present Illness 59-year-old white male with history of congenital heart disease history of cardiomyopathy with congestive heart failure hypertension hyperlipidemia presents to my office for follow-up.  Patient is currently stable without any symptoms of chest pain but has some shortness of breath with exertion.  No complaints of any PND orthopnea.  No palpitations dizziness syncope or swelling of the feet.  Has been taking his medicines regularly.  He has been followed by the heart failure specialist and being worked up for heart transplant also.    The following portions of the patient's history were reviewed and updated as appropriate: allergies, current medications, past family history, past medical history, past social history, past surgical history and problem list.  Past Medical History:   Diagnosis Date   • Cardiomyopathy (CMS/HCC)    • Hyperlipidemia    • Hypertension      Past Surgical History:   Procedure Laterality Date   • CARDIAC CATHETERIZATION  2017     /78   Pulse 62   Ht 175.3 cm (69\")   Wt 98 kg (216 lb)   SpO2 96%   BMI 31.90 kg/m²   History reviewed. No pertinent family history.    Current Outpatient Medications:   •  aspirin (ROHITH ASPIRIN EC LOW DOSE) 81 MG EC tablet, ROHITH ASPIRIN EC LOW DOSE 81 MG TBEC, Disp: , Rfl:   •  carvedilol (COREG) 6.25 MG tablet, , Disp: , Rfl:   •  ENTRESTO 49-51 MG tablet, , Disp: , Rfl:   •  furosemide (LASIX) 40 MG tablet, , Disp: , Rfl:   Allergies   Allergen Reactions   • Hydrocodone Urinary Retention     Social History     Socioeconomic History   • Marital status:      Spouse name: Not on file   • Number of children: Not on file   • Years of education: Not on file   • Highest education level: Not on file   Tobacco Use   • Smoking status: Never Smoker   • Smokeless tobacco: Never Used     Review of Systems "   Constitution: Positive for malaise/fatigue. Negative for fever.   HENT: Negative for ear pain and nosebleeds.    Eyes: Negative for blurred vision and double vision.   Cardiovascular: Negative for chest pain, dyspnea on exertion, leg swelling and palpitations.   Respiratory: Positive for shortness of breath. Negative for cough.    Skin: Negative for rash.   Musculoskeletal: Negative for joint pain.   Gastrointestinal: Negative for abdominal pain, nausea and vomiting.   Neurological: Negative for focal weakness, headaches, light-headedness and numbness.   Psychiatric/Behavioral: Negative for depression. The patient is not nervous/anxious.    All other systems reviewed and are negative.             Objective:     Physical Exam   Constitutional: He appears well-developed and well-nourished.   HENT:   Head: Normocephalic and atraumatic.   Eyes: Pupils are equal, round, and reactive to light. Conjunctivae and EOM are normal. No scleral icterus.   Neck: Normal range of motion. Neck supple. No JVD present. Carotid bruit is not present.   Cardiovascular: Normal rate, regular rhythm, S1 normal, S2 normal and intact distal pulses. PMI is not displaced.   Murmur heard.  Pulmonary/Chest: Effort normal and breath sounds normal. He has no wheezes. He has no rales.   Abdominal: Soft. Bowel sounds are normal.   Musculoskeletal: Normal range of motion.   Neurological: He is alert. He has normal strength.   No focal deficits   Skin: Skin is warm and dry. No rash noted.   Psychiatric: He has a normal mood and affect.     Procedures    Lab Review:       Assessment:          Diagnosis Plan   1. Chronic systolic congestive heart failure (CMS/HCC)     2. Cardiomyopathy, unspecified type (CMS/HCC)     3. Essential hypertension     4. Pure hypercholesterolemia     5. 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  Patient has class II symptoms of heart failure at this time.  Patient's blood pressure and heart rate stable  Patient's lipid levels are followed by the primary care doctor.

## 2019-12-13 PROBLEM — M51.369 DEGENERATION OF INTERVERTEBRAL DISC OF LUMBAR REGION: Status: ACTIVE | Noted: 2017-10-13

## 2019-12-13 PROBLEM — M54.50 CHRONIC LOW BACK PAIN: Status: ACTIVE | Noted: 2017-09-18

## 2019-12-13 PROBLEM — Q20.3 TRANSPOSITION OF GREAT VESSELS: Status: ACTIVE | Noted: 2017-06-29

## 2019-12-13 PROBLEM — H43.811: Status: ACTIVE | Noted: 2019-12-13

## 2019-12-13 PROBLEM — M51.36 DEGENERATION OF INTERVERTEBRAL DISC OF LUMBAR REGION: Status: ACTIVE | Noted: 2017-10-13

## 2019-12-13 PROBLEM — G89.29 CHRONIC LOW BACK PAIN: Status: ACTIVE | Noted: 2017-09-18

## 2019-12-13 PROBLEM — K58.0 IRRITABLE BOWEL SYNDROME WITH DIARRHEA: Status: ACTIVE | Noted: 2018-04-18

## 2019-12-13 PROBLEM — M75.122 COMPLETE ROTATOR CUFF TEAR OR RUPTURE OF LEFT SHOULDER, NOT SPECIFIED AS TRAUMATIC: Status: ACTIVE | Noted: 2017-12-04

## 2019-12-16 ENCOUNTER — TELEPHONE (OUTPATIENT)
Dept: FAMILY MEDICINE CLINIC | Facility: CLINIC | Age: 59
End: 2019-12-16

## 2019-12-16 ENCOUNTER — HOSPITAL ENCOUNTER (OUTPATIENT)
Facility: HOSPITAL | Age: 59
Setting detail: OBSERVATION
Discharge: HOME | End: 2019-12-17
Attending: EMERGENCY MEDICINE | Admitting: HOSPITALIST

## 2019-12-16 ENCOUNTER — TELEPHONE (OUTPATIENT)
Dept: CARDIOLOGY | Facility: CLINIC | Age: 59
End: 2019-12-16

## 2019-12-16 DIAGNOSIS — R11.2 NON-INTRACTABLE VOMITING WITH NAUSEA, UNSPECIFIED VOMITING TYPE: ICD-10-CM

## 2019-12-16 DIAGNOSIS — R53.1 WEAKNESS: ICD-10-CM

## 2019-12-16 DIAGNOSIS — R19.7 DIARRHEA, UNSPECIFIED TYPE: Primary | ICD-10-CM

## 2019-12-16 DIAGNOSIS — E86.0 DEHYDRATION: ICD-10-CM

## 2019-12-16 PROBLEM — I42.9 CARDIOMYOPATHY: Chronic | Status: ACTIVE | Noted: 2019-12-11

## 2019-12-16 LAB
ALBUMIN SERPL-MCNC: 4.6 G/DL (ref 3.5–5.2)
ALBUMIN/GLOB SERPL: 1.5 G/DL
ALP SERPL-CCNC: 58 U/L (ref 39–117)
ALT SERPL W P-5'-P-CCNC: 20 U/L (ref 1–41)
ANION GAP SERPL CALCULATED.3IONS-SCNC: 11 MMOL/L (ref 5–15)
AST SERPL-CCNC: 17 U/L (ref 1–40)
BASOPHILS # BLD AUTO: 0 10*3/MM3 (ref 0–0.2)
BASOPHILS NFR BLD AUTO: 0.3 % (ref 0–1.5)
BILIRUB SERPL-MCNC: 0.7 MG/DL (ref 0.2–1.2)
BILIRUB UR QL STRIP: NEGATIVE
BUN BLD-MCNC: 18 MG/DL (ref 6–20)
BUN/CREAT SERPL: 16.2 (ref 7–25)
CALCIUM SPEC-SCNC: 9.7 MG/DL (ref 8.6–10.5)
CHLORIDE SERPL-SCNC: 105 MMOL/L (ref 98–107)
CLARITY UR: CLEAR
CO2 SERPL-SCNC: 24 MMOL/L (ref 22–29)
COLOR UR: YELLOW
CREAT BLD-MCNC: 1.11 MG/DL (ref 0.76–1.27)
DEPRECATED RDW RBC AUTO: 40.7 FL (ref 37–54)
EOSINOPHIL # BLD AUTO: 0 10*3/MM3 (ref 0–0.4)
EOSINOPHIL NFR BLD AUTO: 0.3 % (ref 0.3–6.2)
ERYTHROCYTE [DISTWIDTH] IN BLOOD BY AUTOMATED COUNT: 12.6 % (ref 12.3–15.4)
FLUAV SUBTYP SPEC NAA+PROBE: NOT DETECTED
FLUBV RNA ISLT QL NAA+PROBE: NOT DETECTED
GFR SERPL CREATININE-BSD FRML MDRD: 68 ML/MIN/1.73
GLOBULIN UR ELPH-MCNC: 3 GM/DL
GLUCOSE BLD-MCNC: 133 MG/DL (ref 65–99)
GLUCOSE UR STRIP-MCNC: NEGATIVE MG/DL
HCT VFR BLD AUTO: 41.7 % (ref 37.5–51)
HGB BLD-MCNC: 14.2 G/DL (ref 13–17.7)
HGB UR QL STRIP.AUTO: NEGATIVE
HOLD SPECIMEN: NORMAL
HOLD SPECIMEN: NORMAL
KETONES UR QL STRIP: NEGATIVE
LEUKOCYTE ESTERASE UR QL STRIP.AUTO: NEGATIVE
LIPASE SERPL-CCNC: 17 U/L (ref 13–60)
LYMPHOCYTES # BLD AUTO: 0.3 10*3/MM3 (ref 0.7–3.1)
LYMPHOCYTES NFR BLD AUTO: 4.7 % (ref 19.6–45.3)
MCH RBC QN AUTO: 31.2 PG (ref 26.6–33)
MCHC RBC AUTO-ENTMCNC: 34.1 G/DL (ref 31.5–35.7)
MCV RBC AUTO: 91.4 FL (ref 79–97)
MONOCYTES # BLD AUTO: 0.5 10*3/MM3 (ref 0.1–0.9)
MONOCYTES NFR BLD AUTO: 6.7 % (ref 5–12)
NEUTROPHILS # BLD AUTO: 6.3 10*3/MM3 (ref 1.7–7)
NEUTROPHILS NFR BLD AUTO: 88 % (ref 42.7–76)
NITRITE UR QL STRIP: NEGATIVE
NRBC BLD AUTO-RTO: 0.1 /100 WBC (ref 0–0.2)
PH UR STRIP.AUTO: 5.5 [PH] (ref 5–8)
PLATELET # BLD AUTO: 194 10*3/MM3 (ref 140–450)
PMV BLD AUTO: 8 FL (ref 6–12)
POTASSIUM BLD-SCNC: 4 MMOL/L (ref 3.5–5.2)
PROT SERPL-MCNC: 7.6 G/DL (ref 6–8.5)
PROT UR QL STRIP: NEGATIVE
RBC # BLD AUTO: 4.56 10*6/MM3 (ref 4.14–5.8)
SODIUM BLD-SCNC: 140 MMOL/L (ref 136–145)
SP GR UR STRIP: 1.02 (ref 1–1.03)
UROBILINOGEN UR QL STRIP: NORMAL
WBC NRBC COR # BLD: 7.1 10*3/MM3 (ref 3.4–10.8)
WHOLE BLOOD HOLD SPECIMEN: NORMAL
WHOLE BLOOD HOLD SPECIMEN: NORMAL

## 2019-12-16 PROCEDURE — 83690 ASSAY OF LIPASE: CPT | Performed by: NURSE PRACTITIONER

## 2019-12-16 PROCEDURE — 81003 URINALYSIS AUTO W/O SCOPE: CPT | Performed by: NURSE PRACTITIONER

## 2019-12-16 PROCEDURE — 87502 INFLUENZA DNA AMP PROBE: CPT | Performed by: NURSE PRACTITIONER

## 2019-12-16 PROCEDURE — 99284 EMERGENCY DEPT VISIT MOD MDM: CPT

## 2019-12-16 PROCEDURE — G0378 HOSPITAL OBSERVATION PER HR: HCPCS

## 2019-12-16 PROCEDURE — 25010000002 ONDANSETRON PER 1 MG: Performed by: NURSE PRACTITIONER

## 2019-12-16 PROCEDURE — 80053 COMPREHEN METABOLIC PANEL: CPT | Performed by: NURSE PRACTITIONER

## 2019-12-16 PROCEDURE — 0097U HC BIOFIRE FILMARRAY GI PANEL: CPT | Performed by: NURSE PRACTITIONER

## 2019-12-16 PROCEDURE — 85025 COMPLETE CBC W/AUTO DIFF WBC: CPT | Performed by: NURSE PRACTITIONER

## 2019-12-16 PROCEDURE — 96374 THER/PROPH/DIAG INJ IV PUSH: CPT

## 2019-12-16 PROCEDURE — 99219 PR INITIAL OBSERVATION CARE/DAY 50 MINUTES: CPT | Performed by: NURSE PRACTITIONER

## 2019-12-16 RX ORDER — ASPIRIN 81 MG/1
81 TABLET ORAL DAILY
Status: DISCONTINUED | OUTPATIENT
Start: 2019-12-17 | End: 2019-12-17 | Stop reason: HOSPADM

## 2019-12-16 RX ORDER — BISACODYL 5 MG/1
5 TABLET, DELAYED RELEASE ORAL DAILY PRN
Status: DISCONTINUED | OUTPATIENT
Start: 2019-12-16 | End: 2019-12-17 | Stop reason: HOSPADM

## 2019-12-16 RX ORDER — ACETAMINOPHEN 650 MG/1
650 SUPPOSITORY RECTAL EVERY 4 HOURS PRN
Status: DISCONTINUED | OUTPATIENT
Start: 2019-12-16 | End: 2019-12-17 | Stop reason: HOSPADM

## 2019-12-16 RX ORDER — BISACODYL 10 MG
10 SUPPOSITORY, RECTAL RECTAL DAILY PRN
Status: DISCONTINUED | OUTPATIENT
Start: 2019-12-16 | End: 2019-12-17 | Stop reason: HOSPADM

## 2019-12-16 RX ORDER — MAGNESIUM SULFATE HEPTAHYDRATE 40 MG/ML
2 INJECTION, SOLUTION INTRAVENOUS AS NEEDED
Status: DISCONTINUED | OUTPATIENT
Start: 2019-12-16 | End: 2019-12-17 | Stop reason: HOSPADM

## 2019-12-16 RX ORDER — CARVEDILOL 3.12 MG/1
3.12 TABLET ORAL 2 TIMES DAILY WITH MEALS
Status: DISCONTINUED | OUTPATIENT
Start: 2019-12-17 | End: 2019-12-17

## 2019-12-16 RX ORDER — ACETAMINOPHEN 160 MG/5ML
650 SOLUTION ORAL EVERY 4 HOURS PRN
Status: DISCONTINUED | OUTPATIENT
Start: 2019-12-16 | End: 2019-12-17 | Stop reason: HOSPADM

## 2019-12-16 RX ORDER — SODIUM CHLORIDE 0.9 % (FLUSH) 0.9 %
10 SYRINGE (ML) INJECTION AS NEEDED
Status: DISCONTINUED | OUTPATIENT
Start: 2019-12-16 | End: 2019-12-17 | Stop reason: HOSPADM

## 2019-12-16 RX ORDER — SODIUM CHLORIDE 9 MG/ML
75 INJECTION, SOLUTION INTRAVENOUS CONTINUOUS
Status: DISCONTINUED | OUTPATIENT
Start: 2019-12-16 | End: 2019-12-17 | Stop reason: HOSPADM

## 2019-12-16 RX ORDER — CARVEDILOL 6.25 MG/1
6.25 TABLET ORAL 2 TIMES DAILY WITH MEALS
Status: DISCONTINUED | OUTPATIENT
Start: 2019-12-16 | End: 2019-12-16

## 2019-12-16 RX ORDER — ACETAMINOPHEN 500 MG
1000 TABLET ORAL ONCE
Status: COMPLETED | OUTPATIENT
Start: 2019-12-16 | End: 2019-12-16

## 2019-12-16 RX ORDER — ACETAMINOPHEN 500 MG
TABLET ORAL
Status: COMPLETED
Start: 2019-12-16 | End: 2019-12-16

## 2019-12-16 RX ORDER — CHOLECALCIFEROL (VITAMIN D3) 125 MCG
5 CAPSULE ORAL NIGHTLY PRN
Status: DISCONTINUED | OUTPATIENT
Start: 2019-12-16 | End: 2019-12-17 | Stop reason: HOSPADM

## 2019-12-16 RX ORDER — ALUMINA, MAGNESIA, AND SIMETHICONE 2400; 2400; 240 MG/30ML; MG/30ML; MG/30ML
15 SUSPENSION ORAL EVERY 6 HOURS PRN
Status: DISCONTINUED | OUTPATIENT
Start: 2019-12-16 | End: 2019-12-17 | Stop reason: HOSPADM

## 2019-12-16 RX ORDER — SODIUM CHLORIDE 0.9 % (FLUSH) 0.9 %
10 SYRINGE (ML) INJECTION EVERY 12 HOURS SCHEDULED
Status: DISCONTINUED | OUTPATIENT
Start: 2019-12-16 | End: 2019-12-17 | Stop reason: HOSPADM

## 2019-12-16 RX ORDER — MAGNESIUM SULFATE 1 G/100ML
1 INJECTION INTRAVENOUS AS NEEDED
Status: DISCONTINUED | OUTPATIENT
Start: 2019-12-16 | End: 2019-12-17 | Stop reason: HOSPADM

## 2019-12-16 RX ORDER — ONDANSETRON 4 MG/1
4 TABLET, FILM COATED ORAL EVERY 6 HOURS PRN
Status: DISCONTINUED | OUTPATIENT
Start: 2019-12-16 | End: 2019-12-17 | Stop reason: HOSPADM

## 2019-12-16 RX ORDER — ONDANSETRON 2 MG/ML
4 INJECTION INTRAMUSCULAR; INTRAVENOUS EVERY 6 HOURS PRN
Status: DISCONTINUED | OUTPATIENT
Start: 2019-12-16 | End: 2019-12-17 | Stop reason: HOSPADM

## 2019-12-16 RX ORDER — ONDANSETRON 2 MG/ML
4 INJECTION INTRAMUSCULAR; INTRAVENOUS ONCE
Status: COMPLETED | OUTPATIENT
Start: 2019-12-16 | End: 2019-12-16

## 2019-12-16 RX ORDER — ACETAMINOPHEN 325 MG/1
650 TABLET ORAL EVERY 4 HOURS PRN
Status: DISCONTINUED | OUTPATIENT
Start: 2019-12-16 | End: 2019-12-17 | Stop reason: HOSPADM

## 2019-12-16 RX ADMIN — ONDANSETRON 4 MG: 2 INJECTION INTRAMUSCULAR; INTRAVENOUS at 16:33

## 2019-12-16 RX ADMIN — SACUBITRIL AND VALSARTAN 1 TABLET: 49; 51 TABLET, FILM COATED ORAL at 23:34

## 2019-12-16 RX ADMIN — SODIUM CHLORIDE, SODIUM LACTATE, POTASSIUM CHLORIDE, AND CALCIUM CHLORIDE 1000 ML: 600; 310; 30; 20 INJECTION, SOLUTION INTRAVENOUS at 16:32

## 2019-12-16 RX ADMIN — ONDANSETRON HYDROCHLORIDE 4 MG: 4 TABLET, FILM COATED ORAL at 23:40

## 2019-12-16 RX ADMIN — Medication 1000 MG: at 18:58

## 2019-12-16 RX ADMIN — ACETAMINOPHEN 1000 MG: 500 TABLET, FILM COATED ORAL at 18:58

## 2019-12-16 RX ADMIN — SODIUM CHLORIDE 75 ML/HR: 900 INJECTION, SOLUTION INTRAVENOUS at 23:42

## 2019-12-16 RX ADMIN — Medication 10 ML: at 22:50

## 2019-12-16 NOTE — ED PROVIDER NOTES
"Subjective   59-year-old male presents with 20 episodes of diarrhea and 3 episodes of vomiting since 7 AM.  He also complains of \"cramping\" all over his abdomen, myalgias, and fatigue.  He denies fever sweats or chills.  He reports decreased p.o. intake.  He denies melena or hematochezia.  He denies decreased urinary output due to the taking a diuretic.    1. Location: Abdomen, generalized  2. Quality: Crampy, myalgias  3. Severity: Moderate  4. Worsening factors: Vomiting diarrhea  5. Alleviating factors: Denies  6. Onset: 7 AM  7. Radiation: Diffusely  8. Frequency: Constant periods of intensity  9. Co-morbidities: Cardiomyopathy, heart failure,Hyperlipidemia, hypertension  10. Source: Patient            Review of Systems   Constitutional: Positive for fatigue. Negative for appetite change, chills, diaphoresis and fever.   Gastrointestinal: Positive for abdominal pain, diarrhea and vomiting. Negative for blood in stool, constipation and nausea.   Genitourinary: Negative for decreased urine volume, difficulty urinating, flank pain and hematuria.   Musculoskeletal: Positive for myalgias.   Skin: Negative for pallor and rash.   Hematological: Negative for adenopathy.   All other systems reviewed and are negative.      Past Medical History:   Diagnosis Date   • Cardiomyopathy (CMS/HCC)    • Hyperlipidemia    • Hypertension        Allergies   Allergen Reactions   • Hydrocodone Urinary Retention       Past Surgical History:   Procedure Laterality Date   • CARDIAC CATHETERIZATION  2017       No family history on file.    Social History     Socioeconomic History   • Marital status:      Spouse name: Not on file   • Number of children: Not on file   • Years of education: Not on file   • Highest education level: Not on file   Tobacco Use   • Smoking status: Never Smoker   • Smokeless tobacco: Never Used           Objective   Physical Exam   Constitutional: He is oriented to person, place, and time. He appears " well-developed and well-nourished.   HENT:   Head: Normocephalic and atraumatic.   Eyes: Pupils are equal, round, and reactive to light. Conjunctivae and EOM are normal.   Neck: Normal range of motion. Neck supple.   Cardiovascular: Normal rate, regular rhythm, normal heart sounds and intact distal pulses. Exam reveals no gallop and no friction rub.   No murmur heard.  Pulmonary/Chest: Effort normal and breath sounds normal. No respiratory distress. He has no wheezes. He has no rales.   Abdominal: Soft. Normal appearance and bowel sounds are normal. He exhibits no distension and no mass. There is no hepatosplenomegaly. There is generalized tenderness. There is no rigidity, no rebound, no guarding, no CVA tenderness, no tenderness at McBurney's point and negative Stephens's sign. No hernia.   Musculoskeletal: Normal range of motion.   Neurological: He is alert and oriented to person, place, and time.   Skin: Skin is warm and dry. Capillary refill takes less than 2 seconds. No rash noted. No erythema. No pallor.   Psychiatric: He has a normal mood and affect. His behavior is normal. Judgment and thought content normal.   Nursing note and vitals reviewed.  Oropharynx dry examination of abdomen reveals no focal tenderness mass rebound or guarding.    Procedures           ED Course      No radiology results for the last day  Medications   sodium chloride 0.9 % flush 10 mL (has no administration in time range)   lactated ringers bolus 1,000 mL (1,000 mL Intravenous New Bag 12/16/19 1632)   ondansetron (ZOFRAN) injection 4 mg (4 mg Intravenous Given 12/16/19 1633)     Labs Reviewed   COMPREHENSIVE METABOLIC PANEL - Abnormal; Notable for the following components:       Result Value    Glucose 133 (*)     All other components within normal limits    Narrative:     GFR Normal >60  Chronic Kidney Disease <60  Kidney Failure <15     CBC WITH AUTO DIFFERENTIAL - Abnormal; Notable for the following components:    Neutrophil % 88.0  (*)     Lymphocyte % 4.7 (*)     Lymphocytes, Absolute 0.30 (*)     All other components within normal limits   INFLUENZA ANTIGEN, RAPID - Normal   LIPASE - Normal   GASTROINTESTINAL PANEL, PCR   RAINBOW DRAW    Narrative:     The following orders were created for panel order [place] Draw.  Procedure                               Abnormality         Status                     ---------                               -----------         ------                     Light Blue Top[[id number]]                                   Final result               Green Top (Gel)[[id number]]                                  Final result               Lavender Top[[id number]]                                     Final result               Gold Top - SST[[id number]]                                   Final result                 Please view results for these tests on the individual orders.   URINALYSIS W/ MICROSCOPIC IF INDICATED (NO CULTURE)   CBC AND DIFFERENTIAL    Narrative:     The following orders were created for panel order CBC & Differential.  Procedure                               Abnormality         Status                     ---------                               -----------         ------                     CBC Auto Differential[[id number]]        Abnormal            Final result                 Please view results for these tests on the individual orders.   LIGHT BLUE TOP   GREEN TOP   LAVENDER TOP   GOLD TOP - SST     Results for orders placed or performed during the hospital encounter of 12/16/19   Influenza Antigen, Rapid - Swab, Nasopharynx   Result Value Ref Range    Influenza A PCR Not Detected Not Detected    Influenza B PCR Not Detected Not Detected   Comprehensive Metabolic Panel   Result Value Ref Range    Glucose 133 (H) 65 - 99 mg/dL    BUN 18 6 - 20 mg/dL    Creatinine 1.11 0.76 - 1.27 mg/dL    Sodium 140 136 - 145 mmol/L    Potassium 4.0 3.5 - 5.2 mmol/L    Chloride 105 98 - 107 mmol/L    CO2 24.0 22.0 - 29.0  mmol/L    Calcium 9.7 8.6 - 10.5 mg/dL    Total Protein 7.6 6.0 - 8.5 g/dL    Albumin 4.60 3.50 - 5.20 g/dL    ALT (SGPT) 20 1 - 41 U/L    AST (SGOT) 17 1 - 40 U/L    Alkaline Phosphatase 58 39 - 117 U/L    Total Bilirubin 0.7 0.2 - 1.2 mg/dL    eGFR Non African Amer 68 >60 mL/min/1.73    Globulin 3.0 gm/dL    A/G Ratio 1.5 g/dL    BUN/Creatinine Ratio 16.2 7.0 - 25.0    Anion Gap 11.0 5.0 - 15.0 mmol/L   Lipase   Result Value Ref Range    Lipase 17 13 - 60 U/L   CBC Auto Differential   Result Value Ref Range    WBC 7.10 3.40 - 10.80 10*3/mm3    RBC 4.56 4.14 - 5.80 10*6/mm3    Hemoglobin 14.2 13.0 - 17.7 g/dL    Hematocrit 41.7 37.5 - 51.0 %    MCV 91.4 79.0 - 97.0 fL    MCH 31.2 26.6 - 33.0 pg    MCHC 34.1 31.5 - 35.7 g/dL    RDW 12.6 12.3 - 15.4 %    RDW-SD 40.7 37.0 - 54.0 fl    MPV 8.0 6.0 - 12.0 fL    Platelets 194 140 - 450 10*3/mm3    Neutrophil % 88.0 (H) 42.7 - 76.0 %    Lymphocyte % 4.7 (L) 19.6 - 45.3 %    Monocyte % 6.7 5.0 - 12.0 %    Eosinophil % 0.3 0.3 - 6.2 %    Basophil % 0.3 0.0 - 1.5 %    Neutrophils, Absolute 6.30 1.70 - 7.00 10*3/mm3    Lymphocytes, Absolute 0.30 (L) 0.70 - 3.10 10*3/mm3    Monocytes, Absolute 0.50 0.10 - 0.90 10*3/mm3    Eosinophils, Absolute 0.00 0.00 - 0.40 10*3/mm3    Basophils, Absolute 0.00 0.00 - 0.20 10*3/mm3    nRBC 0.1 0.0 - 0.2 /100 WBC   Light Blue Top   Result Value Ref Range    Extra Tube hold for add-on    Green Top (Gel)   Result Value Ref Range    Extra Tube Hold for add-ons.    Lavender Top   Result Value Ref Range    Extra Tube hold for add-on    Gold Top - SST   Result Value Ref Range    Extra Tube Hold for add-ons.      No radiology results for the last day  Medications   sodium chloride 0.9 % flush 10 mL (has no administration in time range)   lactated ringers bolus 1,000 mL (1,000 mL Intravenous New Bag 12/16/19 1632)   ondansetron (ZOFRAN) injection 4 mg (4 mg Intravenous Given 12/16/19 1633)     BP 94/55   Pulse 85   Temp 98.9 °F (37.2 °C) (Oral)    "Resp 14   Ht 175.3 cm (69\")   Wt 94 kg (207 lb 3.7 oz)   SpO2 97%   BMI 30.60 kg/m²                   No data recorded                        MDM  Number of Diagnoses or Management Options  Diagnosis management comments: Chart Review: 12/12/2019 patient was seen by Dr. Spence for routine follow-up for his cardiomyopathy and heart failure.  Comorbidity: Cardiomyopathy, heart failure,Hyperlipidemia, hypertension  Imaging: Was interpreted by physician and reviewed by myself:  Disposition/Treatment: Discussed results with patient, verbalized understanding.  Agreeable with plan of care.    Patient undressed and placed in gown for exam. 59-year-old male presents with 20 episodes of diarrhea and 3 episodes of vomiting since 7 AM.  He also complains of \"cramping\" all over his abdomen, myalgias, and fatigue.  He denies fever sweats or chills.  He reports decreased p.o. intake.  He denies melena or hematochezia.  He denies decreased urinary output due to the taking a diuretic.  IV established and labs obtained.  Abdominal protocol initiated.  Patient given lactated Ringer's 1 L bolus and Zofran 4 mg IV.  Orthostatic vital signs obtained prior to IV fluid administration.      Differential: Gastroenteritis, enteritis, dehydration, left leg abnormality  My EKG interpretation:   Lab: Comprehensive metabolic panel essentially normal glucose 133, CBC essentially normal lipase normal influenza antigen negative  Radiology:   Discussion/treatment: Patient received IV fluids.  He received Zofran.  Orthostatic vital signs revealed blood pressure 93/54 heart rate 74 lying  94/55 heart rate 81 sitting, heart rate 91 blood pressure 90/61 standing  Patient's findings were discussed with him.  He has borderline blood pressures.  He has had significant diarrhea.  He has unremarkable laboratory evaluation and physical exam at this time.  He does have history of congestive heart failure with cardiomyopathy and is having evaluation for heart " transplantation.  With his comorbidity he was brought in for observation.  Disposition was discussed with him whether he can go home or should stay for observation.  After discussion he elected to stay.  Patient was discussed with Dr. Stearns.  He will receive additional IV fluids as needed.    Final diagnoses:   Diarrhea, unspecified type   Non-intractable vomiting with nausea, unspecified vomiting type   Weakness   Dehydration              Randell Joe MD  12/16/19 2661

## 2019-12-16 NOTE — TELEPHONE ENCOUNTER
This could be a virus and dehydration. If she is that worried about chest pains then should go to ER.

## 2019-12-16 NOTE — ED NOTES
"Pt states nearly 20 BM w/ diarrhea and vomiting 3 x since 0700. Pt has a hx of heart failure and Pt told me \"Dr. Spence should be called every time I am in the hosital\"      Yasmine Barragan RN  12/16/19 1583    "

## 2019-12-16 NOTE — TELEPHONE ENCOUNTER
Spouse called stating that patient had diarrhea all night. Spouse states that now patient is vomiting and having chest pains. Spouse states that patient has heart disease and she is very worried. Spouse is asking what she should do? Please advise

## 2019-12-17 VITALS
HEIGHT: 69 IN | BODY MASS INDEX: 31.84 KG/M2 | HEART RATE: 63 BPM | SYSTOLIC BLOOD PRESSURE: 107 MMHG | DIASTOLIC BLOOD PRESSURE: 68 MMHG | OXYGEN SATURATION: 95 % | WEIGHT: 215 LBS | RESPIRATION RATE: 16 BRPM | TEMPERATURE: 98.5 F

## 2019-12-17 PROBLEM — A08.11 GASTROENTERITIS DUE TO NOROVIRUS: Status: ACTIVE | Noted: 2019-12-17

## 2019-12-17 LAB
ADV 40+41 DNA STL QL NAA+NON-PROBE: NOT DETECTED
ANION GAP SERPL CALCULATED.3IONS-SCNC: 10 MMOL/L (ref 5–15)
ASTRO TYP 1-8 RNA STL QL NAA+NON-PROBE: NOT DETECTED
BASOPHILS # BLD AUTO: 0 10*3/MM3 (ref 0–0.2)
BASOPHILS NFR BLD AUTO: 0.2 % (ref 0–1.5)
BUN BLD-MCNC: 19 MG/DL (ref 6–20)
BUN/CREAT SERPL: 18.3 (ref 7–25)
C CAYETANENSIS DNA STL QL NAA+NON-PROBE: NOT DETECTED
CALCIUM SPEC-SCNC: 8.9 MG/DL (ref 8.6–10.5)
CAMPY SP DNA.DIARRHEA STL QL NAA+PROBE: NOT DETECTED
CHLORIDE SERPL-SCNC: 105 MMOL/L (ref 98–107)
CO2 SERPL-SCNC: 24 MMOL/L (ref 22–29)
CREAT BLD-MCNC: 1.04 MG/DL (ref 0.76–1.27)
CRYPTOSP STL CULT: NOT DETECTED
DEPRECATED RDW RBC AUTO: 41.6 FL (ref 37–54)
E COLI DNA SPEC QL NAA+PROBE: NOT DETECTED
E HISTOLYT AG STL-ACNC: NOT DETECTED
EAEC PAA PLAS AGGR+AATA ST NAA+NON-PRB: NOT DETECTED
EC STX1 + STX2 GENES STL NAA+PROBE: NOT DETECTED
EOSINOPHIL # BLD AUTO: 0 10*3/MM3 (ref 0–0.4)
EOSINOPHIL NFR BLD AUTO: 0.6 % (ref 0.3–6.2)
EPEC EAE GENE STL QL NAA+NON-PROBE: NOT DETECTED
ERYTHROCYTE [DISTWIDTH] IN BLOOD BY AUTOMATED COUNT: 12.8 % (ref 12.3–15.4)
ETEC LTA+ST1A+ST1B TOX ST NAA+NON-PROBE: NOT DETECTED
G LAMBLIA DNA SPEC QL NAA+PROBE: NOT DETECTED
GFR SERPL CREATININE-BSD FRML MDRD: 73 ML/MIN/1.73
GLUCOSE BLD-MCNC: 120 MG/DL (ref 65–99)
HCT VFR BLD AUTO: 36.7 % (ref 37.5–51)
HGB BLD-MCNC: 12.7 G/DL (ref 13–17.7)
LYMPHOCYTES # BLD AUTO: 0.6 10*3/MM3 (ref 0.7–3.1)
LYMPHOCYTES NFR BLD AUTO: 12.8 % (ref 19.6–45.3)
MAGNESIUM SERPL-MCNC: 2 MG/DL (ref 1.6–2.6)
MCH RBC QN AUTO: 31.4 PG (ref 26.6–33)
MCHC RBC AUTO-ENTMCNC: 34.6 G/DL (ref 31.5–35.7)
MCV RBC AUTO: 90.8 FL (ref 79–97)
MONOCYTES # BLD AUTO: 0.6 10*3/MM3 (ref 0.1–0.9)
MONOCYTES NFR BLD AUTO: 12.8 % (ref 5–12)
NEUTROPHILS # BLD AUTO: 3.7 10*3/MM3 (ref 1.7–7)
NEUTROPHILS NFR BLD AUTO: 73.6 % (ref 42.7–76)
NOROVIRUS GI+II RNA STL QL NAA+NON-PROBE: DETECTED
NRBC BLD AUTO-RTO: 0.1 /100 WBC (ref 0–0.2)
P SHIGELLOIDES DNA STL QL NAA+PROBE: NOT DETECTED
PLATELET # BLD AUTO: 165 10*3/MM3 (ref 140–450)
PMV BLD AUTO: 7.6 FL (ref 6–12)
POTASSIUM BLD-SCNC: 3.8 MMOL/L (ref 3.5–5.2)
RBC # BLD AUTO: 4.04 10*6/MM3 (ref 4.14–5.8)
RV RNA STL NAA+PROBE: NOT DETECTED
SALMONELLA DNA SPEC QL NAA+PROBE: NOT DETECTED
SAPO I+II+IV+V RNA STL QL NAA+NON-PROBE: NOT DETECTED
SHIGELLA SP+EIEC IPAH STL QL NAA+PROBE: NOT DETECTED
SODIUM BLD-SCNC: 139 MMOL/L (ref 136–145)
V CHOLERAE DNA SPEC QL NAA+PROBE: NOT DETECTED
VIBRIO DNA SPEC NAA+PROBE: NOT DETECTED
WBC NRBC COR # BLD: 5 10*3/MM3 (ref 3.4–10.8)
YERSINIA STL CULT: NOT DETECTED

## 2019-12-17 PROCEDURE — G0378 HOSPITAL OBSERVATION PER HR: HCPCS

## 2019-12-17 PROCEDURE — 96361 HYDRATE IV INFUSION ADD-ON: CPT

## 2019-12-17 PROCEDURE — 99217 PR OBSERVATION CARE DISCHARGE MANAGEMENT: CPT | Performed by: HOSPITALIST

## 2019-12-17 PROCEDURE — 83735 ASSAY OF MAGNESIUM: CPT | Performed by: NURSE PRACTITIONER

## 2019-12-17 PROCEDURE — 85025 COMPLETE CBC W/AUTO DIFF WBC: CPT | Performed by: NURSE PRACTITIONER

## 2019-12-17 PROCEDURE — 80048 BASIC METABOLIC PNL TOTAL CA: CPT | Performed by: NURSE PRACTITIONER

## 2019-12-17 RX ORDER — FUROSEMIDE 40 MG/1
40 TABLET ORAL DAILY
Status: DISCONTINUED | OUTPATIENT
Start: 2019-12-17 | End: 2019-12-17 | Stop reason: HOSPADM

## 2019-12-17 RX ORDER — CARVEDILOL 6.25 MG/1
6.25 TABLET ORAL ONCE
Status: DISCONTINUED | OUTPATIENT
Start: 2019-12-17 | End: 2019-12-17 | Stop reason: HOSPADM

## 2019-12-17 RX ADMIN — SACUBITRIL AND VALSARTAN 1 TABLET: 49; 51 TABLET, FILM COATED ORAL at 09:04

## 2019-12-17 RX ADMIN — Medication 10 ML: at 09:04

## 2019-12-17 RX ADMIN — ASPIRIN 81 MG: 81 TABLET, DELAYED RELEASE ORAL at 09:04

## 2019-12-17 RX ADMIN — FUROSEMIDE 40 MG: 40 TABLET ORAL at 13:39

## 2019-12-17 NOTE — H&P
[facility] Medicine Services      Patient Name: Jens Jean-Bapitste  : 1960  MRN: [id number]  Primary Care Physician: Nathan Stout MD  Date of admission: 2019    Patient Care Team:  Nathan Stout MD as PCP - Adam Pepper MD as Consulting Physician (Cardiology)          Subjective   History Present Illness     Chief Complaint:   Chief Complaint   Patient presents with   • Diarrhea       Mr. Jean-Baptiste is a 59 y.o. male with PMH of cardiomyopathy who presented to [facility] ED 2019 with complaints of at least 20 episodes of diarrhea and at least 3 episodes of vomiting that started at 7am. He has associated abdominal cramping with the diarrhea. He has not been able to eat or drink. He has had chills. He feels weak.     In the ED the patient had normal wbc. Temp was 99.8. BP was borderline 93/54. He was given 1L lactated ringers and admitted for further hydration.       Review of Systems   Constitution: Positive for decreased appetite and malaise/fatigue.   HENT: Negative.    Eyes: Negative.    Cardiovascular: Negative.    Respiratory: Negative.    Endocrine: Negative.    Hematologic/Lymphatic: Negative.    Skin: Negative.    Musculoskeletal: Negative.    Gastrointestinal: Positive for abdominal pain, diarrhea, nausea and vomiting.   Genitourinary: Negative.    Neurological: Negative.    Psychiatric/Behavioral: Negative.    Allergic/Immunologic: Negative.    All other systems reviewed and are negative.          Personal History     Past Medical History:   Past Medical History:   Diagnosis Date   • Cardiomyopathy (CMS/HCC)    • Hyperlipidemia    • Hypertension        Surgical History:      Past Surgical History:   Procedure Laterality Date   • CARDIAC CATHETERIZATION         Family History: family history includes Cancer in his father; Dementia in his mother. Otherwise pertinent FHx was reviewed and unremarkable.     Social History:  reports that he has never  smoked. He has never used smokeless tobacco.      Medications:  Prior to Admission medications    Medication Sig Start Date End Date Taking? Authorizing Provider   aspirin (ROHITH ASPIRIN EC LOW DOSE) 81 MG EC tablet Take 81 mg by mouth Daily. 12/4/17  Yes Bhanu Li MD   carvedilol (COREG) 6.25 MG tablet Take 6.25 mg by mouth 2 (Two) Times a Day With Meals. 9/7/19  Yes Bhanu Li MD   ENTRESTO 49-51 MG tablet Take 1 tablet by mouth 2 (Two) Times a Day. 9/9/19  Yes Bhanu Li MD   furosemide (LASIX) 40 MG tablet Take 40 mg by mouth Daily. 9/7/19  Yes Bhanu Li MD       Allergies:    Allergies   Allergen Reactions   • Hydrocodone Urinary Retention       Objective   Objective     Vital Signs  Temp:  [98.5 °F (36.9 °C)-99.8 °F (37.7 °C)] 98.5 °F (36.9 °C)  Heart Rate:  [67-96] 67  Resp:  [14-18] 18  BP: ()/(54-72) 105/65  SpO2:  [93 %-97 %] 95 %  on   ;   Device (Oxygen Therapy): room air  Body mass index is 31.9 kg/m².    Physical Exam   Constitutional: He is oriented to person, place, and time. He appears well-developed and well-nourished. No distress.   HENT:   Head: Normocephalic and atraumatic.   Nose: Nose normal.   Eyes: Pupils are equal, round, and reactive to light. Conjunctivae and EOM are normal.   Neck: Normal range of motion.   Cardiovascular: Normal rate, regular rhythm and intact distal pulses. Exam reveals no friction rub.   Pulmonary/Chest: Effort normal and breath sounds normal. No stridor. No respiratory distress.   Abdominal: Soft. Bowel sounds are normal. He exhibits no distension. There is tenderness.   Musculoskeletal: Normal range of motion. He exhibits no edema, tenderness or deformity.   Neurological: He is alert and oriented to person, place, and time. No cranial nerve deficit.   Skin: Skin is warm and dry. No rash noted. He is not diaphoretic. No erythema.   Psychiatric: He has a normal mood and affect. His behavior is normal.   Nursing note  "and vitals reviewed.      Results Review:  I have personally reviewed most recent cardiac tracings, lab results and radiology images and interpretations and agree with findings.      Results from last 7 days   Lab Units 12/16/19  1632   WBC 10*3/mm3 7.10   HEMOGLOBIN g/dL 14.2   HEMATOCRIT % 41.7   PLATELETS 10*3/mm3 194     Results from last 7 days   Lab Units 12/16/19  1632   SODIUM mmol/L 140   POTASSIUM mmol/L 4.0   CHLORIDE mmol/L 105   CO2 mmol/L 24.0   BUN mg/dL 18   CREATININE mg/dL 1.11   GLUCOSE mg/dL 133*   CALCIUM mg/dL 9.7   ALT (SGPT) U/L 20   AST (SGOT) U/L 17     Estimated Creatinine Clearance: 82.7 mL/min (by C-G formula based on SCr of 1.11 mg/dL).  Brief Urine Lab Results  (Last result in the past 365 days)      Color   Clarity   Blood   Leuk Est   Nitrite   Protein   CREAT   Urine HCG        12/16/19 2058 Yellow Clear Negative Negative Negative Negative               Microbiology Results (last 10 days)     Procedure Component Value - Date/Time    Influenza Antigen, Rapid - Swab, Nasopharynx [[id number]]  (Normal) Collected:  12/16/19 1635    Lab Status:  Final result Specimen:  Swab from Nasopharynx Updated:  12/16/19 1725     Influenza A PCR Not Detected     Influenza B PCR Not Detected          ECG/EMG Results (most recent)     None            Estimated Creatinine Clearance: 82.7 mL/min (by C-G formula based on SCr of 1.11 mg/dL).    Assessment/Plan   Assessment/Plan       Active Hospital Problems    Diagnosis  POA   • **Nausea vomiting and diarrhea [R11.2, R19.7]  Yes     Priority: High   • Cardiomyopathy (CMS/HCC) [I42.9]  Yes   • Chronic low back pain [M54.5, G89.29]  Yes   • Hypertension [I10]  Yes   • Hyperlipidemia [E78.5]  Yes      Resolved Hospital Problems   No resolved problems to display.     Nausea, vomiting, diarrhea  -suspected gastrointestinal virus  -IVFs, antiemetics, clear liquid diet    Cardiomyopathy  -2D echo 12/7/2018: \"The ventricle on the left side with tricuspid valve is " "dilated with severe LV dysfunction EF 15-20%. Mild to moderate regurgitation on the left sided valve. Ventricle on the right side with normal mitral neel. Aortic valve not well-visualized. No pericardial effusion.\"  -cont home asa, entresto, coreg at lower dose due to borderline bp     Hypertension--borderline low bp  -resume meds as above    Hyperlipidemia  -on no home meds    Chronic low back pain  -on no home meds      VTE Prophylaxis - SCDs.    CODE STATUS:    Code Status and Medical Interventions:   Ordered at: 12/16/19 2108     Level Of Support Discussed With:    Patient     Code Status:    CPR     Medical Interventions (Level of Support Prior to Arrest):    Full       Admission Status:  I believe this patient meets observation criteria.      I discussed the patients findings and my recommendations with patient.        Electronically signed by AUGUSTO Shepherd, 12/16/19, 7:15 PM.  [facility] Hospitalist Team        "

## 2019-12-17 NOTE — SIGNIFICANT NOTE
12/17/19 1529   Discharge of Care   Discharge Mode wheel chair   Discharge Destination home   Discharged Accompanied by spouse   Discharge Contact Information if Applicable Aysha Jean-Baptiste [phone]   Discharge Teaching Done  Yes   Learning Method Explanation;Teach Back;Written Materials   Patient's wife at bedside for discharge. She is transporting patient home

## 2019-12-17 NOTE — PLAN OF CARE
Problem: Patient Care Overview  Goal: Plan of Care Review  Outcome: Ongoing (interventions implemented as appropriate)  Flowsheets (Taken 12/17/2019 0440)  Progress: no change  Plan of Care Reviewed With: patient  Outcome Summary: Patient c/o nausea off and on but did manage to eat some jello

## 2019-12-17 NOTE — PROGRESS NOTES
Discharge Planning Assessment   [facility]     Patient Name: Jens Jean-Baptiste  MRN: [id number]  Today's Date: 12/17/2019    Admit Date: 12/16/2019    Discharge Needs Assessment     Row Name 12/17/19 1121       Living Environment    Lives With  spouse    Current Living Arrangements  home/apartment/condo    Primary Care Provided by  self    Provides Primary Care For  no one    Family Caregiver if Needed  spouse    Able to Return to Prior Arrangements  yes       Resource/Environmental Concerns    Resource/Environmental Concerns  none    Transportation Concerns  car, none       Transition Planning    Patient/Family Anticipates Transition to  home with family    Patient/Family Anticipated Services at Transition  none    Transportation Anticipated  car, drives self;family or friend will provide       Discharge Needs Assessment    Readmission Within the Last 30 Days  no previous admission in last 30 days    Concerns to be Addressed  no discharge needs identified;denies needs/concerns at this time    Equipment Currently Used at Home  none    Anticipated Changes Related to Illness  none    Equipment Needed After Discharge  none        Discharge Plan     Row Name 12/17/19 1121       Plan    Plan  Anticipate routine home.     Patient/Family in Agreement with Plan  yes    Plan Comments  Met with patient at bedside who reports no anticipated needs at discharge. Barrier: continuous IV fluids.         Expected Discharge Date and Time     Expected Discharge Date Expected Discharge Time    Dec 18, 2019         Demographic Summary     Row Name 12/17/19 1120       General Information    Admission Type  observation    Arrived From  home    Required Notices Provided  Observation Status Notice    Referral Source  admission list    Reason for Consult  discharge planning        Functional Status     Row Name 12/17/19 1120       Functional Status    Usual Activity Tolerance  good    Current Activity Tolerance  good       Functional Status,  IADL    Medications  independent    Meal Preparation  independent    Housekeeping  independent    Laundry  independent    Shopping  independent       Mental Status    General Appearance WDL  WDL       Mental Status Summary    Recent Changes in Mental Status/Cognitive Functioning  no changes        Patient Forms     Row Name 12/17/19 1120       Patient Forms    Important Message from Medicare ([facility])  -- Jacobo 12/16    Patient Observation Letter  Delivered        Natividad Pascual  [phone]

## 2019-12-17 NOTE — DISCHARGE SUMMARY
[facility] Medicine Services  DISCHARGE SUMMARY        Prepared For PCP:  Nathan Stout MD    Patient Name: Jens Jean-Baptiste  : 1960  MRN: [id number]      Date of Admission:   2019    Date of Discharge:  2019    Length of stay:  LOS: 0 days     Hospital Course     Presenting Problem:   Dehydration [E86.0]  Weakness [R53.1]  Diarrhea, unspecified type [R19.7]  Non-intractable vomiting with nausea, unspecified vomiting type [R11.2]      Active Hospital Problems    Diagnosis  POA   • **Gastroenteritis due to norovirus [A08.11]  Yes     Priority: High   • Nausea vomiting and diarrhea [R11.2, R19.7]  Yes   • Cardiomyopathy (CMS/HCC) [I42.9]  Yes   • Chronic low back pain [M54.5, G89.29]  Yes   • Hypertension [I10]  Yes   • Hyperlipidemia [E78.5]  Yes      Resolved Hospital Problems   No resolved problems to display.       Active Hospital Problems:  No notes have been filed under this hospital service.  Service: Hospitalist      Resolved Hospital Problems:  No notes have been filed under this hospital service.  Service: Hospitalist        Hospital Course:    The patient was placed on observation.  Stool PCR was positive for norovirus.  The patient was discharged home in the afternoon of 2019.  He will follow-up with his PCP within 2 weeks.    Recommendation for Outpatient Providers:       Reasons For Change In Medications and Indications for New Medications:        Day of Discharge     HPI:     The patient is a 59 y.o. male with PMH of cardiomyopathy who presented to the ED on 2019 with complaints of at least 20 episodes of diarrhea and at least 3 episodes of vomiting that started at 7am on 2019. I was associated abdominal cramping with the diarrhea.         Vital Signs:   Temp:  [98.5 °F (36.9 °C)-99.8 °F (37.7 °C)] 98.5 °F (36.9 °C)  Heart Rate:  [63-96] 63  Resp:  [14-18] 16  BP: ()/(54-72) 107/68     Physical Exam:  Physical Exam    Constitutional: He is oriented to person, place, and time. He appears well-developed and well-nourished.   HENT:   Head: Normocephalic and atraumatic.   Eyes: Pupils are equal, round, and reactive to light. EOM are normal.   Neck: Normal range of motion. Neck supple.   Cardiovascular: Normal rate and normal heart sounds.   Pulmonary/Chest: Effort normal and breath sounds normal.   Abdominal: Soft. Bowel sounds are normal.   Musculoskeletal: Normal range of motion.   Neurological: He is alert and oriented to person, place, and time.   Skin: Skin is warm and dry.   Psychiatric: He has a normal mood and affect.       Pertinent  and/or Most Recent Results     Results from last 7 days   Lab Units 12/17/19  0356 12/16/19  1632   WBC 10*3/mm3 5.00 7.10   HEMOGLOBIN g/dL 12.7* 14.2   HEMATOCRIT % 36.7* 41.7   PLATELETS 10*3/mm3 165 194   SODIUM mmol/L 139 140   POTASSIUM mmol/L 3.8 4.0   CHLORIDE mmol/L 105 105   CO2 mmol/L 24.0 24.0   BUN mg/dL 19 18   CREATININE mg/dL 1.04 1.11   GLUCOSE mg/dL 120* 133*   CALCIUM mg/dL 8.9 9.7     Results from last 7 days   Lab Units 12/16/19  1632   BILIRUBIN mg/dL 0.7   ALK PHOS U/L 58   ALT (SGPT) U/L 20   AST (SGOT) U/L 17           Invalid input(s): TG, LDLCALC, LDLREALC        Brief Urine Lab Results  (Last result in the past 365 days)      Color   Clarity   Blood   Leuk Est   Nitrite   Protein   CREAT   Urine HCG        12/16/19 2058 Yellow Clear Negative Negative Negative Negative               Microbiology Results Abnormal     Procedure Component Value - Date/Time    Gastrointestinal Panel, PCR - Stool, Per Rectum [[id number]]  (Abnormal) Collected:  12/16/19 2058    Lab Status:  Final result Specimen:  Stool from Per Rectum Updated:  12/17/19 0912     Campylobacter Not Detected     Plesiomonas shigelloides Not Detected     Salmonella Not Detected     Vibrio Not Detected     Vibrio cholerae Not Detected     Yersinia enterocolitica Not Detected     Enteroaggregative E.  coli (EAEC) Not Detected     Enteropathogenic E. coli (EPEC) Not Detected     Enterotoxigenic E. coli (ETEC) lt/st Not Detected     Shiga-like toxin-producing E. coli (STEC) stx1/stx2 Not Detected     E. coli O157 Not Detected     Shigella/Enteroinvasive E. coli (EIEC) Not Detected     Cryptosporidium Not Detected     Cyclospora cayetanensis Not Detected     Entamoeba histolytica Not Detected     Giardia lamblia Not Detected     Adenovirus F40/41 Not Detected     Astrovirus Not Detected     Norovirus GI/GII Detected     Rotavirus A Not Detected     Sapovirus (I, II, IV or V) Not Detected    Narrative:       If Aeromonas, Staphylococcus aureus or Bacillus cereus are suspected, please order [id number]: Stool Culture, Aeromonas, S aureus, B Cereus.    Influenza Antigen, Rapid - Swab, Nasopharynx [[id number]]  (Normal) Collected:  12/16/19 1635    Lab Status:  Final result Specimen:  Swab from Nasopharynx Updated:  12/16/19 1725     Influenza A PCR Not Detected     Influenza B PCR Not Detected          Xr Elbow 2 View Left    Result Date: 11/21/2019  Impression: 1. Soft tissue swelling, mainly posteriorly and medially. 2. Proximal ulnar spurring.  Electronically Signed By-Mary Lou Alcantara On:11/21/2019 7:24 PM This report was finalized on [id number] by  Mary Lou Alcantara, .                             Test Results Pending at Discharge: None        Procedures Performed; None           Consults:   Consults     No orders found for last 30 day(s).            Discharge Details        Discharge Medications      Continue These Medications      Instructions Start Date   ROHITH ASPIRIN EC LOW DOSE 81 MG EC tablet  Generic drug:  aspirin   81 mg, Oral, Daily      carvedilol 6.25 MG tablet  Commonly known as:  COREG   6.25 mg, Oral, 2 Times Daily With Meals      ENTRESTO 49-51 MG tablet  Generic drug:  sacubitril-valsartan   1 tablet, Oral, 2 Times Daily      furosemide 40 MG tablet  Commonly known as:  LASIX   40 mg, Oral, Daily              Allergies   Allergen Reactions   • Hydrocodone Urinary Retention         Discharge Disposition:  Home or Self Care    Diet:  Hospital:  Diet Order   Procedures   • Diet Liquids; Clear Liquid         Discharge Activity:         CODE STATUS:    Code Status and Medical Interventions:   Ordered at: 12/16/19 2108     Level Of Support Discussed With:    Patient     Code Status:    CPR     Medical Interventions (Level of Support Prior to Arrest):    Full         Follow-up Appointments  Future Appointments   Date Time Provider Department Center   11/5/2020 12:30 PM Adam [place] MD [facility]       Additional Instructions for the Follow-ups that You Need to Schedule     Discharge Follow-up with PCP   As directed       Currently Documented PCP:    Nathan Stout MD    PCP Phone Number:    [phone]     Follow Up Details:  2 weeks                 Condition on Discharge:      Stable          Electronically signed by Micky Wall DO, 12/17/19, 3:13 PM.    Time: I spent  15  minutes on this discharge activity which included face-to-face encounter with the patient/reviewing the data in the system/coordination of the care with the nursing staff as well as consultants/documentation/entering orders.

## 2019-12-18 ENCOUNTER — READMISSION MANAGEMENT (OUTPATIENT)
Dept: CALL CENTER | Facility: HOSPITAL | Age: 59
End: 2019-12-18

## 2019-12-18 NOTE — OUTREACH NOTE
Prep Survey      Responses   Facility patient discharged from?  [facility]   Is patient eligible?  Yes   Discharge diagnosis  Gastroenteritis due to norovirus    Does the patient have one of the following disease processes/diagnoses(primary or secondary)?  Other   Does the patient have Home health ordered?  No   Is there a DME ordered?  No   Prep survey completed?  Yes          Lisa Crenshaw LPN

## 2019-12-18 NOTE — PROGRESS NOTES
Case Management Discharge Note      Final Note: Home.       Final Discharge Disposition Code: 01 - home or self-care

## 2019-12-19 ENCOUNTER — READMISSION MANAGEMENT (OUTPATIENT)
Dept: CALL CENTER | Facility: HOSPITAL | Age: 59
End: 2019-12-19

## 2019-12-19 NOTE — OUTREACH NOTE
Medical Week 1 Survey      Responses   Facility patient discharged from?  [facility]   Does the patient have one of the following disease processes/diagnoses(primary or secondary)?  Other   Is there a successful TCM telephone encounter documented?  No   Week 1 attempt successful?  Yes   Call start time  1615   Call end time  1617   Discharge diagnosis  Gastroenteritis due to norovirus    Meds reviewed with patient/caregiver?  Yes   Does the patient have all medications ordered at discharge?  N/A   Does the patient have a primary care provider?   Yes   Does the patient have an appointment with their PCP within 7 days of discharge?  Yes   Has the patient kept scheduled appointments due by today?  N/A   Has home health visited the patient within 72 hours of discharge?  N/A   Did the patient receive a copy of their discharge instructions?  Yes   Nursing interventions  Reviewed instructions with patient   What is the patient's perception of their health status since discharge?  Improving   Is the patient/caregiver able to teach back signs and symptoms related to disease process for when to call PCP?  Yes   Is the patient/caregiver able to teach back signs and symptoms related to disease process for when to call 911?  Yes   Is the patient/caregiver able to teach back the hierarchy of who to call/visit for symptoms/problems? PCP, Specialist, Home health nurse, Urgent Care, ED, 911  Yes   Week 1 call completed?  Yes   Graduated  Yes   Did the patient feel the follow up calls were helpful during their recovery period?  Yes   Was the number of calls appropriate?  Yes          Magalys Jameson LPN

## 2020-01-13 ENCOUNTER — TELEPHONE (OUTPATIENT)
Dept: FAMILY MEDICINE CLINIC | Facility: CLINIC | Age: 60
End: 2020-01-13

## 2020-01-13 RX ORDER — AMOXICILLIN 500 MG/1
CAPSULE ORAL
Qty: 60 CAPSULE | Refills: 0 | Status: SHIPPED | OUTPATIENT
Start: 2020-01-13 | End: 2020-10-16

## 2020-01-13 NOTE — TELEPHONE ENCOUNTER
Spouse called stating that patient has fever, head congestion, and drainage. Spouse states that patient feels awful and is requesting meds.

## 2020-10-15 ENCOUNTER — TELEPHONE (OUTPATIENT)
Dept: FAMILY MEDICINE CLINIC | Facility: CLINIC | Age: 60
End: 2020-10-15

## 2020-10-15 NOTE — TELEPHONE ENCOUNTER
PATIENTS WIFE CALLED IN REQUEST THAT AN ANTIBIOTIC BE CALLED IN.  PATIENT HAS AN EARACHE AND NECK PAIN     [facility] - [place], [place] - [address] - [phone]  - [phone] FX

## 2020-10-16 RX ORDER — AMOXICILLIN 500 MG/1
1000 CAPSULE ORAL 3 TIMES DAILY
Qty: 60 CAPSULE | Refills: 0 | Status: SHIPPED | OUTPATIENT
Start: 2020-10-16 | End: 2020-10-26

## 2020-10-16 NOTE — TELEPHONE ENCOUNTER
Wife called and said the rx is not at the pharmacy and there is nothing under meds that was sent in yesterday.  Please send in again asap.

## 2020-10-26 ENCOUNTER — FLU SHOT (OUTPATIENT)
Dept: FAMILY MEDICINE CLINIC | Facility: CLINIC | Age: 60
End: 2020-10-26

## 2020-10-26 DIAGNOSIS — Z23 IMMUNIZATION DUE: Primary | ICD-10-CM

## 2020-10-26 PROCEDURE — G0008 ADMIN INFLUENZA VIRUS VAC: HCPCS | Performed by: FAMILY MEDICINE

## 2020-10-26 PROCEDURE — 90686 IIV4 VACC NO PRSV 0.5 ML IM: CPT | Performed by: FAMILY MEDICINE

## 2020-11-02 ENCOUNTER — TELEPHONE (OUTPATIENT)
Dept: CARDIOLOGY | Facility: CLINIC | Age: 60
End: 2020-11-02

## 2020-11-02 NOTE — TELEPHONE ENCOUNTER
FYI  DO NOT CALL HER BACK, RUSS HAS APT THURSDAY  PER WIFE SEGUNDO, SHE WANTS DR LOCKE TO KNOW THAT RUSS IS HAVING CHEST PRESSURE AND NECK AND HEAD PAIN, AFTER DRINKING PAIN GOES AWAY.  PAIN HAS WOKE HIM OUT OF HIS SLEEP.  ALSO, DR LÓPEZ DID  NOT SCHEDULE A STRESS TEST, WILL DR LOCKE SCHEDULE FRANKO?

## 2020-11-05 ENCOUNTER — OFFICE VISIT (OUTPATIENT)
Dept: FAMILY MEDICINE CLINIC | Facility: CLINIC | Age: 60
End: 2020-11-05

## 2020-11-05 ENCOUNTER — TELEPHONE (OUTPATIENT)
Dept: FAMILY MEDICINE CLINIC | Facility: CLINIC | Age: 60
End: 2020-11-05

## 2020-11-05 ENCOUNTER — OFFICE VISIT (OUTPATIENT)
Dept: CARDIOLOGY | Facility: CLINIC | Age: 60
End: 2020-11-05

## 2020-11-05 VITALS
SYSTOLIC BLOOD PRESSURE: 110 MMHG | HEART RATE: 63 BPM | WEIGHT: 228 LBS | RESPIRATION RATE: 16 BRPM | DIASTOLIC BLOOD PRESSURE: 70 MMHG | HEIGHT: 69 IN | TEMPERATURE: 98 F | OXYGEN SATURATION: 97 % | BODY MASS INDEX: 33.77 KG/M2

## 2020-11-05 VITALS
DIASTOLIC BLOOD PRESSURE: 73 MMHG | WEIGHT: 228 LBS | BODY MASS INDEX: 33.77 KG/M2 | HEART RATE: 62 BPM | TEMPERATURE: 97.3 F | OXYGEN SATURATION: 96 % | SYSTOLIC BLOOD PRESSURE: 110 MMHG | HEIGHT: 69 IN

## 2020-11-05 DIAGNOSIS — E78.00 PURE HYPERCHOLESTEROLEMIA: ICD-10-CM

## 2020-11-05 DIAGNOSIS — I50.22 CHRONIC SYSTOLIC CONGESTIVE HEART FAILURE (HCC): Primary | ICD-10-CM

## 2020-11-05 DIAGNOSIS — Q24.9 CONGENITAL HEART DISEASE: ICD-10-CM

## 2020-11-05 DIAGNOSIS — I10 ESSENTIAL HYPERTENSION: ICD-10-CM

## 2020-11-05 DIAGNOSIS — G90.01 CAROTIDYNIA: Primary | ICD-10-CM

## 2020-11-05 DIAGNOSIS — I42.9 CARDIOMYOPATHY, UNSPECIFIED TYPE (HCC): ICD-10-CM

## 2020-11-05 DIAGNOSIS — I25.10 CORONARY ARTERY DISEASE INVOLVING NATIVE CORONARY ARTERY OF NATIVE HEART WITHOUT ANGINA PECTORIS: ICD-10-CM

## 2020-11-05 PROBLEM — I50.42 CHRONIC COMBINED SYSTOLIC AND DIASTOLIC CONGESTIVE HEART FAILURE, NYHA CLASS 2: Status: ACTIVE | Noted: 2020-09-28

## 2020-11-05 PROCEDURE — 99214 OFFICE O/P EST MOD 30 MIN: CPT | Performed by: INTERNAL MEDICINE

## 2020-11-05 PROCEDURE — 99213 OFFICE O/P EST LOW 20 MIN: CPT | Performed by: FAMILY MEDICINE

## 2020-11-05 NOTE — TELEPHONE ENCOUNTER
PATIENTS WIFE CALLED REQUESTING AN APPT W DR GARZA ASAP    WIFE AWARE NO APPT UNTIL 1/14     WIFE WANTED TO REQUEST FROM DR. GARZA TO 'SQUEEZE PT IN'    PLEASE CALL IF ANY AVAILABILITY OPENS UP     APPT ON WAITLIST    [phone]

## 2020-11-05 NOTE — PROGRESS NOTES
"Rooming Tab(CC,VS,Pt Hx,Fall Screen)  Chief Complaint   Patient presents with   • Earache       Subjective    Ear pain    Comes and goes.  Can hear ok.   No fever.      I have reviewed and updated his medications, medical history and problem list during today's office visit.     Patient Care Team:  Nathan Stout MD as PCP - General  Adam Spence MD as Consulting Physician (Cardiology)    Problem List Tab  Medications Tab  Synopsis Tab  Chart Review Tab  Care Everywhere Tab  Immunizations Tab  Patient History Tab    Social History     Tobacco Use   • Smoking status: Never Smoker   • Smokeless tobacco: Never Used   Substance Use Topics   • Alcohol use: Not on file       Review of Systems   Constitutional: Negative for activity change, appetite change, fatigue, fever and unexpected weight loss.   HENT: Negative for congestion, ear pain, hearing loss, postnasal drip, rhinorrhea, sinus pressure, sneezing, sore throat and swollen glands.    Eyes: Negative for blurred vision.   Respiratory: Negative for cough, shortness of breath and wheezing.    Cardiovascular: Negative for chest pain.   Gastrointestinal: Negative for abdominal pain, nausea and indigestion.   Genitourinary: Negative for dysuria, urgency and urinary incontinence.   Musculoskeletal: Negative for arthralgias, back pain, joint swelling and myalgias.   Skin: Negative for dry skin and skin lesions.   Allergic/Immunologic: Negative for environmental allergies.   Neurological: Negative for dizziness, headache, memory problem and confusion.   Hematological: Negative for adenopathy.   Psychiatric/Behavioral: Negative for agitation, depressed mood and stress. The patient is not nervous/anxious.    All other systems reviewed and are negative.      Objective     Rooming Tab(CC,VS,Pt Hx,Fall Screen)  /70 (BP Location: Right arm)   Pulse 63   Temp 98 °F (36.7 °C)   Resp 16   Ht 175.3 cm (69\")   Wt 103 kg (228 lb)   SpO2 97%   BMI 33.67 kg/m² "     Body mass index is 33.67 kg/m².    Physical Exam  Constitutional:       Appearance: He is well-developed.   HENT:      Head: Normocephalic.      Right Ear: Tympanic membrane, ear canal and external ear normal.      Left Ear: Tympanic membrane, ear canal and external ear normal.      Nose: Nose normal.      Mouth/Throat:      Pharynx: No oropharyngeal exudate.   Eyes:      Conjunctiva/sclera: Conjunctivae normal.      Pupils: Pupils are equal, round, and reactive to light.   Neck:      Musculoskeletal: Normal range of motion and neck supple.   Cardiovascular:      Rate and Rhythm: Normal rate and regular rhythm.      Heart sounds: Normal heart sounds.   Pulmonary:      Effort: Pulmonary effort is normal.      Breath sounds: Normal breath sounds.   Abdominal:      General: Bowel sounds are normal.      Palpations: Abdomen is soft.   Musculoskeletal: Normal range of motion.   Skin:     General: Skin is warm and dry.   Neurological:      Mental Status: He is alert and oriented to person, place, and time.   Psychiatric:         Behavior: Behavior normal.         Thought Content: Thought content normal.         Judgment: Judgment normal.          Statin Choice Calculator  Data Reviewed:               Lab Results   Component Value Date    GLU 97 09/28/2020    BUN 29 (H) 09/28/2020    CREATININE 1.10 09/28/2020     09/28/2020    K 4.5 09/28/2020     09/28/2020    CALCIUM 9.6 09/28/2020      Assessment/Plan   Order Review Tab  Health Maintenance Tab  Patient Plan/Order Tab  Diagnoses and all orders for this visit:    1. Carotidynia (Primary)  Assessment & Plan:  Heat //   Rest //  advil prn.        Wrapup Tab  Return in about 4 months (around 3/5/2021).

## 2020-11-05 NOTE — TELEPHONE ENCOUNTER
The reason was left out of this message so I called patient's wife.  She said two weeks ago you gave patient a 10 day antibiotic for an ear ache.  He took it and it hasn't helped.  His ear still aches and now it is into his neck.  Concerned because of congestive heart failure.

## 2020-11-05 NOTE — PROGRESS NOTES
"    Subjective:     Encounter Date:11/05/2020      Patient ID: Jens Jean-Baptiste is a 60 y.o. male.    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  Patient is currently stable without any symptoms of chest pain or shortness of breath at rest on exertion.  No complains of any PND or orthopnea.  No palpitation dizziness syncope or swelling of the feet.  He is take his medicine regularly.  He is also followed by the heart failure specialist.  He does not smoke he is trying to exercise regular.    The following portions of the patient's history were reviewed and updated as appropriate: allergies, current medications, past family history, past medical history, past social history, past surgical history and problem list.  Past Medical History:   Diagnosis Date   • Cardiomyopathy (CMS/HCC)    • Hyperlipidemia    • Hypertension      Past Surgical History:   Procedure Laterality Date   • CARDIAC CATHETERIZATION  2017     /73   Pulse 62   Temp 97.3 °F (36.3 °C)   Ht 175.3 cm (69\")   Wt 103 kg (228 lb)   SpO2 96%   BMI 33.67 kg/m²   Family History   Problem Relation Age of Onset   • Dementia Mother    • Cancer Father        Current Outpatient Medications:   •  aspirin (ORHITH ASPIRIN EC LOW DOSE) 81 MG EC tablet, Take 81 mg by mouth Daily., Disp: , Rfl:   •  carvedilol (COREG) 6.25 MG tablet, Take 6.25 mg by mouth 2 (Two) Times a Day With Meals., Disp: , Rfl:   •  ENTRESTO 49-51 MG tablet, Take 1 tablet by mouth 2 (Two) Times a Day., Disp: , Rfl:   •  furosemide (LASIX) 40 MG tablet, Take 40 mg by mouth Daily., Disp: , Rfl:   Allergies   Allergen Reactions   • Hydrocodone Urinary Retention     Social History     Socioeconomic History   • Marital status:      Spouse name: Not " on file   • Number of children: Not on file   • Years of education: Not on file   • Highest education level: Not on file   Tobacco Use   • Smoking status: Never Smoker   • Smokeless tobacco: Never Used     Review of Systems   Constitution: Negative for fever and malaise/fatigue.   HENT: Negative for ear pain and nosebleeds.    Eyes: Negative for blurred vision and double vision.   Cardiovascular: Negative for chest pain, dyspnea on exertion, leg swelling and palpitations.   Respiratory: Negative for cough and shortness of breath.    Skin: Negative for rash.   Musculoskeletal: Negative for joint pain.   Gastrointestinal: Negative for abdominal pain, nausea and vomiting.   Neurological: Negative for focal weakness, headaches, light-headedness and numbness.   Psychiatric/Behavioral: Negative for depression. The patient is not nervous/anxious.    All other systems reviewed and are negative.             Objective:     Constitutional:       Appearance: Well-developed.   Eyes:      General: No scleral icterus.     Conjunctiva/sclera: Conjunctivae normal.      Pupils: Pupils are equal, round, and reactive to light.   HENT:      Head: Normocephalic and atraumatic.   Neck:      Musculoskeletal: Normal range of motion and neck supple.      Vascular: No carotid bruit or JVD.   Pulmonary:      Effort: Pulmonary effort is normal.      Breath sounds: Normal breath sounds. No wheezing. No rales.   Cardiovascular:      Normal rate. Regular rhythm.      Murmurs: There is a systolic murmur.   Pulses:     Intact distal pulses.   Abdominal:      General: Bowel sounds are normal.      Palpations: Abdomen is soft.   Musculoskeletal: Normal range of motion.   Skin:     General: Skin is warm and dry.      Findings: No rash.   Neurological:      Mental Status: Alert.      Comments: No focal deficits       Procedures    Lab Review:       Assessment:          Diagnosis Plan   1. Chronic systolic congestive heart failure (CMS/HCC)     2.  Cardiomyopathy, unspecified type (CMS/HCC)     3. Essential hypertension     4. Pure hypercholesterolemia     5. Congenital heart disease            Plan:     Patient has history of congenital heart disease with transposition of the great arteries and is currently stable on medication  Patient has history of congestive heart failure with nonischemic cardiomyopathy and is currently on medical therapy with Coreg and Entresto and Lasix  Patient's blood pressure and heart rate stable  Patient's lipid levels are followed by the primary care doctor  Patient is also followed by the heart failure specialist.  Continue current medicines and follow in 6 months

## 2020-11-05 NOTE — TELEPHONE ENCOUNTER
Gave message to patient's wife at 10:21am and wants the 3pm today with PMJ.    Cleopatra/Vanesa - Please put on PMJ's schedule today at 3pm.

## 2021-03-02 ENCOUNTER — LAB (OUTPATIENT)
Dept: FAMILY MEDICINE CLINIC | Facility: CLINIC | Age: 61
End: 2021-03-02

## 2021-03-02 ENCOUNTER — OFFICE VISIT (OUTPATIENT)
Dept: FAMILY MEDICINE CLINIC | Facility: CLINIC | Age: 61
End: 2021-03-02

## 2021-03-02 VITALS
BODY MASS INDEX: 33.92 KG/M2 | TEMPERATURE: 97.3 F | HEART RATE: 73 BPM | RESPIRATION RATE: 16 BRPM | HEIGHT: 69 IN | SYSTOLIC BLOOD PRESSURE: 108 MMHG | OXYGEN SATURATION: 96 % | DIASTOLIC BLOOD PRESSURE: 70 MMHG | WEIGHT: 229 LBS

## 2021-03-02 DIAGNOSIS — R10.84 ABDOMINAL PAIN, GENERALIZED: Primary | ICD-10-CM

## 2021-03-02 DIAGNOSIS — I50.42 CHRONIC COMBINED SYSTOLIC AND DIASTOLIC CONGESTIVE HEART FAILURE, NYHA CLASS 2 (HCC): ICD-10-CM

## 2021-03-02 DIAGNOSIS — R10.84 ABDOMINAL PAIN, GENERALIZED: ICD-10-CM

## 2021-03-02 PROCEDURE — 36415 COLL VENOUS BLD VENIPUNCTURE: CPT

## 2021-03-02 PROCEDURE — 99214 OFFICE O/P EST MOD 30 MIN: CPT | Performed by: PHYSICIAN ASSISTANT

## 2021-03-02 PROCEDURE — 83690 ASSAY OF LIPASE: CPT | Performed by: PHYSICIAN ASSISTANT

## 2021-03-02 PROCEDURE — 85025 COMPLETE CBC W/AUTO DIFF WBC: CPT | Performed by: PHYSICIAN ASSISTANT

## 2021-03-02 PROCEDURE — 80053 COMPREHEN METABOLIC PANEL: CPT | Performed by: PHYSICIAN ASSISTANT

## 2021-03-02 NOTE — PROGRESS NOTES
"Subjective   Jens Jean-Baptiste is a 60 y.o. male.     Chief Complaint   Patient presents with   • Abdominal Pain       /70 (BP Location: Right arm)   Pulse 73   Temp 97.3 °F (36.3 °C) (Temporal)   Resp 16   Ht 175.3 cm (69\")   Wt 104 kg (229 lb)   SpO2 96%   BMI 33.82 kg/m²     BP Readings from Last 3 Encounters:   03/02/21 108/70   11/05/20 110/70   11/05/20 110/73       Wt Readings from Last 3 Encounters:   03/02/21 104 kg (229 lb)   11/05/20 103 kg (228 lb)   11/05/20 103 kg (228 lb)       HPI patient presents to the clinic for abdominal pain.  The abdominal pain was generalized abdominal pain lasted over the past couple days.  He states that the abdominal pain has now resolved.  The abdominal pain was sharp in nature and did not radiate.  There was no associated symptoms such as constipation, diarrhea, nausea, or vomiting.  He has had a previous gallbladder work-up in the past 1 to 2 years by Dr. Stout that was unremarkable.  He denies having fevers or chills.  At this time he does not have any symptoms.  He does have a significant history of transposition of great vessels and congestive heart failure.  Unfortunately his ejection fraction is very low at 15 to 20%.  He is following with the heart failure clinic and with Dr. Zamora.  He denies shortness of air, weight gain, lower extremity edema, orthopnea, or worsening dyspnea on exertion.    The following portions of the patient's history were reviewed and updated as appropriate: allergies, current medications, past family history, past medical history, past social history, past surgical history and problem list.    Review of Systems   Constitutional: Negative for fatigue and fever.   Respiratory: Negative for cough and shortness of breath.    Cardiovascular: Negative for chest pain.   Gastrointestinal: Positive for abdominal pain (resolved). Negative for blood in stool, constipation, diarrhea, nausea and vomiting.   Genitourinary: Negative for " dysuria.   Musculoskeletal: Negative for back pain and neck pain.   Skin: Negative for rash and bruise.   Neurological: Negative for weakness and headache.   Psychiatric/Behavioral: Negative for depressed mood. The patient is not nervous/anxious.        Objective   Physical Exam  Constitutional:       Appearance: He is well-developed.   HENT:      Head: Normocephalic and atraumatic.   Eyes:      Conjunctiva/sclera: Conjunctivae normal.      Pupils: Pupils are equal, round, and reactive to light.   Neck:      Musculoskeletal: Normal range of motion and neck supple.   Cardiovascular:      Rate and Rhythm: Normal rate and regular rhythm.      Heart sounds: No murmur.   Pulmonary:      Effort: Pulmonary effort is normal.      Breath sounds: Normal breath sounds.   Abdominal:      General: Bowel sounds are normal. There is no distension.      Palpations: Abdomen is soft.      Tenderness: There is no abdominal tenderness.   Musculoskeletal: Normal range of motion.         General: No deformity.   Lymphadenopathy:      Cervical: No cervical adenopathy.   Skin:     General: Skin is warm and dry.      Capillary Refill: Capillary refill takes less than 2 seconds.      Findings: No rash.   Neurological:      Mental Status: He is alert and oriented to person, place, and time.      Cranial Nerves: No cranial nerve deficit.   Psychiatric:         Behavior: Behavior normal.         Thought Content: Thought content normal.         Judgment: Judgment normal.           Diagnoses and all orders for this visit:    1. Abdominal pain, generalized (Primary)  -     Comprehensive metabolic panel; Future  -     CBC w AUTO Differential; Future  -     Lipase; Future    2. Chronic combined systolic and diastolic congestive heart failure, NYHA class 2 (CMS/HCC)    We will begin with basic blood work for his abdominal pain.  It has resolved primarily but he states that it is intermittent and so we will obtain labs.  I asked him to touch base with  his cardiologist either Dr. Zamora or the heart failure clinic to discuss going on an SGLT2 inhibitor such as Jardiance.  Even though he does not have diabetes there are trials such as the emporor that does show that there may be benefit for him to be on this medication.    Return if symptoms worsen or fail to improve.

## 2021-03-03 ENCOUNTER — TELEPHONE (OUTPATIENT)
Dept: FAMILY MEDICINE CLINIC | Facility: CLINIC | Age: 61
End: 2021-03-03

## 2021-03-03 LAB
ALBUMIN SERPL-MCNC: 4.3 G/DL (ref 3.5–5.2)
ALBUMIN/GLOB SERPL: 1.7 G/DL
ALP SERPL-CCNC: 69 U/L (ref 39–117)
ALT SERPL W P-5'-P-CCNC: 59 U/L (ref 1–41)
ANION GAP SERPL CALCULATED.3IONS-SCNC: 8.5 MMOL/L (ref 5–15)
AST SERPL-CCNC: 41 U/L (ref 1–40)
BASOPHILS # BLD AUTO: 0.03 10*3/MM3 (ref 0–0.2)
BASOPHILS NFR BLD AUTO: 0.4 % (ref 0–1.5)
BILIRUB SERPL-MCNC: 0.4 MG/DL (ref 0–1.2)
BUN SERPL-MCNC: 16 MG/DL (ref 8–23)
BUN/CREAT SERPL: 14.2 (ref 7–25)
CALCIUM SPEC-SCNC: 9 MG/DL (ref 8.6–10.5)
CHLORIDE SERPL-SCNC: 101 MMOL/L (ref 98–107)
CO2 SERPL-SCNC: 27.5 MMOL/L (ref 22–29)
CREAT SERPL-MCNC: 1.13 MG/DL (ref 0.76–1.27)
DEPRECATED RDW RBC AUTO: 42.2 FL (ref 37–54)
EOSINOPHIL # BLD AUTO: 0.1 10*3/MM3 (ref 0–0.4)
EOSINOPHIL NFR BLD AUTO: 1.2 % (ref 0.3–6.2)
ERYTHROCYTE [DISTWIDTH] IN BLOOD BY AUTOMATED COUNT: 12.2 % (ref 12.3–15.4)
GFR SERPL CREATININE-BSD FRML MDRD: 66 ML/MIN/1.73
GLOBULIN UR ELPH-MCNC: 2.6 GM/DL
GLUCOSE SERPL-MCNC: 105 MG/DL (ref 65–99)
HCT VFR BLD AUTO: 38.6 % (ref 37.5–51)
HGB BLD-MCNC: 12.8 G/DL (ref 13–17.7)
IMM GRANULOCYTES # BLD AUTO: 0.03 10*3/MM3 (ref 0–0.05)
IMM GRANULOCYTES NFR BLD AUTO: 0.4 % (ref 0–0.5)
LIPASE SERPL-CCNC: 24 U/L (ref 13–60)
LYMPHOCYTES # BLD AUTO: 1.78 10*3/MM3 (ref 0.7–3.1)
LYMPHOCYTES NFR BLD AUTO: 21.4 % (ref 19.6–45.3)
MCH RBC QN AUTO: 31 PG (ref 26.6–33)
MCHC RBC AUTO-ENTMCNC: 33.2 G/DL (ref 31.5–35.7)
MCV RBC AUTO: 93.5 FL (ref 79–97)
MONOCYTES # BLD AUTO: 0.97 10*3/MM3 (ref 0.1–0.9)
MONOCYTES NFR BLD AUTO: 11.7 % (ref 5–12)
NEUTROPHILS NFR BLD AUTO: 5.39 10*3/MM3 (ref 1.7–7)
NEUTROPHILS NFR BLD AUTO: 64.9 % (ref 42.7–76)
NRBC BLD AUTO-RTO: 0 /100 WBC (ref 0–0.2)
PLATELET # BLD AUTO: 190 10*3/MM3 (ref 140–450)
PMV BLD AUTO: 10.4 FL (ref 6–12)
POTASSIUM SERPL-SCNC: 3.8 MMOL/L (ref 3.5–5.2)
PROT SERPL-MCNC: 6.9 G/DL (ref 6–8.5)
RBC # BLD AUTO: 4.13 10*6/MM3 (ref 4.14–5.8)
SODIUM SERPL-SCNC: 137 MMOL/L (ref 136–145)
WBC # BLD AUTO: 8.3 10*3/MM3 (ref 3.4–10.8)

## 2021-03-03 NOTE — TELEPHONE ENCOUNTER
Caller: SEGUNDO CHARLES    Relationship: Emergency Contact    Best call back number: [phone] LEAVE A MESSAGE IF NO ANSWER    Caller requesting test results: WIFE    What test was performed: LABS    When was the test performed: 3/2/21    Where was the test performed: [facility]    Additional notes:    WOULD LIKE A CALL BACK WHEN THE RESULTS COME IN

## 2021-03-04 NOTE — PROGRESS NOTES
Spoke with pt, and made him aware. Pt states that abd pain is improving at this time but will notify us if it worsens again. roland

## 2021-07-13 RX ORDER — SACUBITRIL AND VALSARTAN 49; 51 MG/1; MG/1
1 TABLET, FILM COATED ORAL 2 TIMES DAILY
Qty: 30 TABLET | Refills: 0 | COMMUNITY
Start: 2021-07-13

## 2021-07-13 NOTE — TELEPHONE ENCOUNTER
Pt wife LMOM and stated needed samples of Entresto 49/51mg because he is having problems getting insurance and pharmacy to fill. Called pt back and [facility] have samples up front for . Also [facility] needs to have appt scheduled for f/u he said he would call back and schedule appt has to find out when heart failure clinic appt is because he doesn't want to overlap appts.

## 2021-08-11 ENCOUNTER — TELEPHONE (OUTPATIENT)
Dept: CARDIOLOGY | Facility: CLINIC | Age: 61
End: 2021-08-11

## 2021-08-13 ENCOUNTER — TELEPHONE (OUTPATIENT)
Dept: CARDIOLOGY | Facility: CLINIC | Age: 61
End: 2021-08-13

## 2021-08-13 NOTE — TELEPHONE ENCOUNTER
Wants apt next week. Having trouble getting food to go down when he eats. Dr Spence has told them before this is related to his heart.

## 2021-08-19 ENCOUNTER — OFFICE VISIT (OUTPATIENT)
Dept: CARDIOLOGY | Facility: CLINIC | Age: 61
End: 2021-08-19

## 2021-08-19 VITALS
WEIGHT: 222.25 LBS | HEART RATE: 58 BPM | SYSTOLIC BLOOD PRESSURE: 115 MMHG | BODY MASS INDEX: 32.92 KG/M2 | DIASTOLIC BLOOD PRESSURE: 70 MMHG | HEIGHT: 69 IN | OXYGEN SATURATION: 97 %

## 2021-08-19 DIAGNOSIS — I42.9 CARDIOMYOPATHY, UNSPECIFIED TYPE (HCC): ICD-10-CM

## 2021-08-19 DIAGNOSIS — I10 ESSENTIAL HYPERTENSION: ICD-10-CM

## 2021-08-19 DIAGNOSIS — E78.00 PURE HYPERCHOLESTEROLEMIA: ICD-10-CM

## 2021-08-19 DIAGNOSIS — I50.22 CHRONIC SYSTOLIC CONGESTIVE HEART FAILURE (HCC): Primary | ICD-10-CM

## 2021-08-19 PROCEDURE — 93000 ELECTROCARDIOGRAM COMPLETE: CPT | Performed by: INTERNAL MEDICINE

## 2021-08-19 PROCEDURE — 99214 OFFICE O/P EST MOD 30 MIN: CPT | Performed by: INTERNAL MEDICINE

## 2021-08-19 NOTE — PROGRESS NOTES
"    Subjective:     Encounter Date:08/19/2021      Patient ID: Jens Jean-Baptiste is a 61 y.o. male.    Chief Complaint: Congestive heart failure  History of Present Illness 60-year-old white male with history of congenital heart disease with a corrected detransposition of great arteries history of congestive heart failure cardiomyopathy hypertension hyperlipidemia presents to my office for follow-up.  Patient is currently having symptoms of shortness of breath along with some dizziness.  No chest pains or palpitations.  No syncope or swelling of the feet.  He also has been having problems swallowing.  He has been taking his medicine regularly.  He is also followed by the heart failure and transplant team.  The following portions of the patient's history were reviewed and updated as appropriate: allergies, current medications, past family history, past medical history, past social history, past surgical history and problem list.  Past Medical History:   Diagnosis Date   • Cardiomyopathy (CMS/HCC)    • Hyperlipidemia    • Hypertension      Past Surgical History:   Procedure Laterality Date   • CARDIAC CATHETERIZATION  2017     /70 (BP Location: Left arm, Patient Position: Sitting, Cuff Size: Adult)   Pulse 58   Ht 175.3 cm (69\")   Wt 101 kg (222 lb 4 oz)   SpO2 97%   BMI 32.82 kg/m²   Family History   Problem Relation Age of Onset   • Dementia Mother    • Cancer Father        Current Outpatient Medications:   •  aspirin (ROHITH ASPIRIN EC LOW DOSE) 81 MG EC tablet, Take 81 mg by mouth Daily., Disp: , Rfl:   •  carvedilol (COREG) 6.25 MG tablet, Take 6.25 mg by mouth Daily., Disp: , Rfl:   •  ENTRESTO 49-51 MG tablet, Take 1 tablet by mouth 2 (Two) Times a Day., Disp: , Rfl:   •  furosemide (LASIX) 40 MG tablet, Take 40 mg by mouth Daily., Disp: , Rfl:   Allergies   Allergen Reactions   • Hydrocodone Urinary Retention     Social History     Socioeconomic History   • Marital status:      Spouse name: Not " on file   • Number of children: Not on file   • Years of education: Not on file   • Highest education level: Not on file   Tobacco Use   • Smoking status: Never Smoker   • Smokeless tobacco: Never Used   Vaping Use   • Vaping Use: Never used   Substance and Sexual Activity   • Alcohol use: Never   • Drug use: Never   • Sexual activity: Defer     Review of Systems   Constitutional: Positive for malaise/fatigue. Negative for fever.   HENT: Negative for congestion and hearing loss.    Eyes: Negative for double vision and visual disturbance.   Cardiovascular: Negative for chest pain, claudication, dyspnea on exertion, leg swelling and syncope.   Respiratory: Positive for shortness of breath. Negative for cough.    Endocrine: Negative for cold intolerance.   Skin: Negative for color change and rash.   Musculoskeletal: Negative for arthritis and joint pain.   Gastrointestinal: Negative for abdominal pain and heartburn.   Genitourinary: Negative for hematuria.   Neurological: Positive for dizziness. Negative for excessive daytime sleepiness.   Psychiatric/Behavioral: Negative for depression. The patient is not nervous/anxious.    All other systems reviewed and are negative.             Objective:     Constitutional:       Appearance: Well-developed.   Eyes:      General: No scleral icterus.     Conjunctiva/sclera: Conjunctivae normal.   HENT:      Head: Normocephalic and atraumatic.   Neck:      Vascular: No carotid bruit or JVD.   Pulmonary:      Effort: Pulmonary effort is normal.      Breath sounds: Normal breath sounds. No wheezing. No rales.   Cardiovascular:      Normal rate. Regular rhythm.      Murmurs: There is a systolic murmur.   Pulses:     Intact distal pulses.   Abdominal:      General: Bowel sounds are normal.      Palpations: Abdomen is soft.   Musculoskeletal:      Cervical back: Normal range of motion and neck supple. Skin:     General: Skin is warm and dry.      Findings: No rash.   Neurological:       Mental Status: Alert.         ECG 12 Lead    Date/Time: 8/19/2021 12:36 PM  Performed by: Adam Spence MD  Authorized by: Adam Spence MD   Comments: Sinus bradycardia  Abnormal branch block  Abnormal EKG  No new changes from previous EKG            Lab Review:         MDM #1 congenital heart disease  Patient had currently d-transposition of great arteries and he has congestive heart failure with the nonischemic cardiomyopathy and is followed by the heart failure and transplant team and is on medical therapy at this time  2.  Patient has been having symptoms of dysphagia and hence he will be seen by the gastroenterologist for work-up  3.  Hypertension  Patient blood pressure currently stable on Entresto and carvedilol  4.  Hyperlipidemia  Patient lipid levels are followed by the primary care doctor.      Patient's previous medical records, labs, and EKG were reviewed and discussed with the patient at today's visit.

## 2021-08-25 ENCOUNTER — TELEPHONE (OUTPATIENT)
Dept: CARDIOLOGY | Facility: CLINIC | Age: 61
End: 2021-08-25

## 2021-08-25 NOTE — TELEPHONE ENCOUNTER
Have we received any forms for patient? Coming from [employer]. Wife said they were faxed on Monday. I did let her know Dr. Spence was not here yesterday. I do not see anything in chart.

## 2021-08-25 NOTE — TELEPHONE ENCOUNTER
I filled out forms for [employer] 2 weeks ago and faxed back, I dont have anything more recent then those

## 2021-08-26 NOTE — TELEPHONE ENCOUNTER
Spoke to Jens. Let him know we faxed papers 2 weeks ago. He said they are needing medical records. I told him to have someone reach out to use so we know what records they are needing.

## 2021-10-05 ENCOUNTER — OFFICE (OUTPATIENT)
Dept: URBAN - METROPOLITAN AREA CLINIC 64 | Facility: CLINIC | Age: 61
End: 2021-10-05

## 2021-10-05 ENCOUNTER — TRANSCRIBE ORDERS (OUTPATIENT)
Dept: ADMINISTRATIVE | Facility: HOSPITAL | Age: 61
End: 2021-10-05

## 2021-10-05 VITALS
SYSTOLIC BLOOD PRESSURE: 101 MMHG | HEART RATE: 62 BPM | HEIGHT: 69 IN | DIASTOLIC BLOOD PRESSURE: 59 MMHG | WEIGHT: 222 LBS

## 2021-10-05 DIAGNOSIS — R13.10 DYSPHAGIA, UNSPECIFIED TYPE: Primary | ICD-10-CM

## 2021-10-05 DIAGNOSIS — R68.81 EARLY SATIETY: ICD-10-CM

## 2021-10-05 DIAGNOSIS — R13.10 DYSPHAGIA, UNSPECIFIED: ICD-10-CM

## 2021-10-05 DIAGNOSIS — R10.11 RIGHT UPPER QUADRANT PAIN: ICD-10-CM

## 2021-10-05 DIAGNOSIS — R10.13 EPIGASTRIC PAIN: ICD-10-CM

## 2021-10-05 DIAGNOSIS — R19.5 OTHER FECAL ABNORMALITIES: ICD-10-CM

## 2021-10-05 PROCEDURE — 99203 OFFICE O/P NEW LOW 30 MIN: CPT | Performed by: NURSE PRACTITIONER

## 2021-10-14 ENCOUNTER — HOSPITAL ENCOUNTER (OUTPATIENT)
Dept: ULTRASOUND IMAGING | Facility: HOSPITAL | Age: 61
Discharge: HOME | End: 2021-10-14
Admitting: NURSE PRACTITIONER

## 2021-10-14 ENCOUNTER — APPOINTMENT (OUTPATIENT)
Dept: ULTRASOUND IMAGING | Facility: HOSPITAL | Age: 61
End: 2021-10-14

## 2021-10-14 DIAGNOSIS — R10.13 EPIGASTRIC PAIN: ICD-10-CM

## 2021-10-14 DIAGNOSIS — R13.10 DYSPHAGIA, UNSPECIFIED TYPE: ICD-10-CM

## 2021-10-14 PROCEDURE — 76705 ECHO EXAM OF ABDOMEN: CPT

## 2021-10-26 ENCOUNTER — LAB (OUTPATIENT)
Dept: LAB | Facility: HOSPITAL | Age: 61
End: 2021-10-26

## 2021-10-26 LAB — SARS-COV-2 ORF1AB RESP QL NAA+PROBE: NOT DETECTED

## 2021-10-26 PROCEDURE — U0004 COV-19 TEST NON-CDC HGH THRU: HCPCS

## 2021-10-26 PROCEDURE — C9803 HOPD COVID-19 SPEC COLLECT: HCPCS

## 2021-10-28 ENCOUNTER — TELEPHONE (OUTPATIENT)
Dept: FAMILY MEDICINE CLINIC | Facility: CLINIC | Age: 61
End: 2021-10-28

## 2021-10-28 ENCOUNTER — ON CAMPUS - OUTPATIENT (OUTPATIENT)
Dept: URBAN - METROPOLITAN AREA HOSPITAL 85 | Facility: HOSPITAL | Age: 61
End: 2021-10-28
Payer: COMMERCIAL

## 2021-10-28 ENCOUNTER — HOSPITAL ENCOUNTER (OUTPATIENT)
Facility: HOSPITAL | Age: 61
Setting detail: HOSPITAL OUTPATIENT SURGERY
Discharge: HOME | End: 2021-10-28
Attending: INTERNAL MEDICINE | Admitting: INTERNAL MEDICINE

## 2021-10-28 ENCOUNTER — ANESTHESIA EVENT (OUTPATIENT)
Dept: GASTROENTEROLOGY | Facility: HOSPITAL | Age: 61
End: 2021-10-28

## 2021-10-28 ENCOUNTER — ANESTHESIA (OUTPATIENT)
Dept: GASTROENTEROLOGY | Facility: HOSPITAL | Age: 61
End: 2021-10-28

## 2021-10-28 VITALS
RESPIRATION RATE: 16 BRPM | BODY MASS INDEX: 32.39 KG/M2 | TEMPERATURE: 97.3 F | WEIGHT: 218.7 LBS | HEIGHT: 69 IN | HEART RATE: 65 BPM | SYSTOLIC BLOOD PRESSURE: 112 MMHG | DIASTOLIC BLOOD PRESSURE: 60 MMHG | OXYGEN SATURATION: 98 %

## 2021-10-28 DIAGNOSIS — Z12.11 SCREENING FOR COLON CANCER: ICD-10-CM

## 2021-10-28 DIAGNOSIS — R13.10 DYSPHAGIA: ICD-10-CM

## 2021-10-28 DIAGNOSIS — K31.89 OTHER DISEASES OF STOMACH AND DUODENUM: ICD-10-CM

## 2021-10-28 DIAGNOSIS — K29.60 OTHER GASTRITIS WITHOUT BLEEDING: ICD-10-CM

## 2021-10-28 DIAGNOSIS — K21.00 GASTRO-ESOPHAGEAL REFLUX DISEASE WITH ESOPHAGITIS, WITHOUT B: ICD-10-CM

## 2021-10-28 DIAGNOSIS — R10.13 EPIGASTRIC PAIN: ICD-10-CM

## 2021-10-28 DIAGNOSIS — Z12.11 ENCOUNTER FOR SCREENING FOR MALIGNANT NEOPLASM OF COLON: ICD-10-CM

## 2021-10-28 DIAGNOSIS — R13.10 DYSPHAGIA, UNSPECIFIED: ICD-10-CM

## 2021-10-28 DIAGNOSIS — K57.30 DIVERTICULOSIS OF LARGE INTESTINE WITHOUT PERFORATION OR ABS: ICD-10-CM

## 2021-10-28 PROCEDURE — 25010000002 PROPOFOL 10 MG/ML EMULSION: Performed by: ANESTHESIOLOGY

## 2021-10-28 PROCEDURE — 43239 EGD BIOPSY SINGLE/MULTIPLE: CPT | Performed by: INTERNAL MEDICINE

## 2021-10-28 PROCEDURE — 43450 DILATE ESOPHAGUS 1/MULT PASS: CPT | Mod: 59 | Performed by: INTERNAL MEDICINE

## 2021-10-28 PROCEDURE — G0121 COLON CA SCRN NOT HI RSK IND: HCPCS | Performed by: INTERNAL MEDICINE

## 2021-10-28 PROCEDURE — 88305 TISSUE EXAM BY PATHOLOGIST: CPT | Performed by: INTERNAL MEDICINE

## 2021-10-28 RX ORDER — SODIUM CHLORIDE 9 MG/ML
9 INJECTION, SOLUTION INTRAVENOUS CONTINUOUS
Status: DISCONTINUED | OUTPATIENT
Start: 2021-10-28 | End: 2021-10-28 | Stop reason: HOSPADM

## 2021-10-28 RX ORDER — PROPOFOL 10 MG/ML
VIAL (ML) INTRAVENOUS AS NEEDED
Status: DISCONTINUED | OUTPATIENT
Start: 2021-10-28 | End: 2021-10-28 | Stop reason: SURG

## 2021-10-28 RX ADMIN — PROPOFOL 50 MG: 10 INJECTION, EMULSION INTRAVENOUS at 08:52

## 2021-10-28 RX ADMIN — PROPOFOL 100 MG: 10 INJECTION, EMULSION INTRAVENOUS at 08:42

## 2021-10-28 RX ADMIN — SODIUM CHLORIDE 9 ML/HR: 9 INJECTION, SOLUTION INTRAVENOUS at 07:22

## 2021-10-28 RX ADMIN — PROPOFOL 100 MG: 10 INJECTION, EMULSION INTRAVENOUS at 08:48

## 2021-10-28 RX ADMIN — PROPOFOL 50 MG: 10 INJECTION, EMULSION INTRAVENOUS at 08:46

## 2021-10-28 RX ADMIN — PROPOFOL 50 MG: 10 INJECTION, EMULSION INTRAVENOUS at 08:59

## 2021-10-28 RX ADMIN — PROPOFOL 50 MG: 10 INJECTION, EMULSION INTRAVENOUS at 09:03

## 2021-10-28 RX ADMIN — PROPOFOL 100 MG: 10 INJECTION, EMULSION INTRAVENOUS at 08:54

## 2021-10-28 NOTE — ANESTHESIA PREPROCEDURE EVALUATION
Anesthesia Evaluation     NPO Solid Status: > 8 hours  NPO Liquid Status: > 8 hours           Airway   Mallampati: II  TM distance: >3 FB  No difficulty expected  Dental      Pulmonary    Cardiovascular     (+) hypertension, CHF Diastolic >=55%, hyperlipidemia,  carotid artery disease    ROS comment: Interpretation  Technically difficult study.  The ventricle on the left side with tricuspid valve is dilated with severe LV  dysfunction with EF of 15-20%  There is mild-to-moderate regurgitation on the left-sided valve  The ventricle on the right side with the mitral valve is normal in size and  good contractility and EF of 55-60%.  aortic valve is not well-visualized.  No pericardial effusion noted      Neuro/Psych  GI/Hepatic/Renal/Endo      Musculoskeletal     Abdominal    Substance History      OB/GYN          Other   arthritis,                      Anesthesia Plan    ASA 3     MAC     intravenous induction     Anesthetic plan, all risks, benefits, and alternatives have been provided, discussed and informed consent has been obtained with: patient.       Atrial fibrillation is stable.  Continue metoprolol XL at 50 mg p.o. daily and Eliquis at 5 mg p.o. twice daily.  The patient has a pacemaker.

## 2021-10-28 NOTE — ANESTHESIA POSTPROCEDURE EVALUATION
Patient: Jens Guerraexter    Procedure Summary     Date: 10/28/21 Room / Location: [facility] ENDOSCOPY 4 / [facility] ENDOSCOPY    Anesthesia Start: 0840 Anesthesia Stop: 0911    Procedures:       ESOPHAGOGASTRODUODENOSCOPY with biopsy x2 areas and dilitation (#54 bougie) (N/A )      COLONOSCOPY (N/A ) Diagnosis:       Dysphagia      Epigastric pain      Screening for colon cancer      (Dysphagia [R13.10])      (Epigastric pain [R10.13])      (Screening for colon cancer [Z12.11])    Surgeons: AURELIA Bird MD Provider: Enoc Beavers MD    Anesthesia Type: MAC ASA Status: 3          Anesthesia Type: MAC    Vitals  Vitals Value Taken Time   /60 10/28/21 0939   Temp     Pulse 65 10/28/21 0939   Resp 16 10/28/21 0939   SpO2 98 % 10/28/21 0939           Post Anesthesia Care and Evaluation    Patient location during evaluation: PACU  Patient participation: complete - patient participated  Level of consciousness: awake  Pain scale: See nurse's notes for pain score.  Pain management: adequate  Airway patency: patent  Anesthetic complications: No anesthetic complications  PONV Status: none  Cardiovascular status: acceptable  Respiratory status: acceptable  Hydration status: acceptable    Comments: Patient seen and examined postoperatively; vital signs stable; SpO2 greater than or equal to 90%; cardiopulmonary status stable; nausea/vomiting adequately controlled; pain adequately controlled; no apparent anesthesia complications; patient discharged from anesthesia care when discharge criteria were met

## 2021-10-28 NOTE — TELEPHONE ENCOUNTER
Caller: CORINNA CHARLESBIE    Relationship: Emergency Contact    Best call back number: [phone]     What is the best time to reach you:ANYTIME     Who are you requesting to speak with (clinical staff, provider,  specific staff member): SEGUNDO     Do you know the name of the person who called: SEGUNDO     What was the call regarding: MS. CHARLES WOULD LIKE TO KNOW IF HE CAN HAVE THE 65+  FLU VACCINE GIVEN HIS MEDICAL CONDITION    Do you require a callback: YES

## 2021-10-29 LAB
LAB AP CASE REPORT: NORMAL
PATH REPORT.FINAL DX SPEC: NORMAL
PATH REPORT.GROSS SPEC: NORMAL

## 2021-11-12 ENCOUNTER — APPOINTMENT (OUTPATIENT)
Dept: GENERAL RADIOLOGY | Facility: HOSPITAL | Age: 61
End: 2021-11-12

## 2021-11-12 ENCOUNTER — TELEPHONE (OUTPATIENT)
Dept: FAMILY MEDICINE CLINIC | Facility: CLINIC | Age: 61
End: 2021-11-12

## 2021-11-12 ENCOUNTER — HOSPITAL ENCOUNTER (EMERGENCY)
Facility: HOSPITAL | Age: 61
Discharge: HOME | End: 2021-11-13
Admitting: EMERGENCY MEDICINE

## 2021-11-12 DIAGNOSIS — R53.83 FATIGUE, UNSPECIFIED TYPE: ICD-10-CM

## 2021-11-12 DIAGNOSIS — U07.1 COVID-19: Primary | ICD-10-CM

## 2021-11-12 DIAGNOSIS — N17.9 AKI (ACUTE KIDNEY INJURY) (HCC): ICD-10-CM

## 2021-11-12 LAB
ALBUMIN SERPL-MCNC: 4.1 G/DL (ref 3.5–5.2)
ALBUMIN/GLOB SERPL: 1.6 G/DL
ALP SERPL-CCNC: 62 U/L (ref 39–117)
ALT SERPL W P-5'-P-CCNC: 39 U/L (ref 1–41)
ANION GAP SERPL CALCULATED.3IONS-SCNC: 15 MMOL/L (ref 5–15)
APTT PPP: 31.6 SECONDS (ref 24–31)
AST SERPL-CCNC: 42 U/L (ref 1–40)
B PARAPERT DNA SPEC QL NAA+PROBE: NOT DETECTED
B PERT DNA SPEC QL NAA+PROBE: NOT DETECTED
BASOPHILS # BLD AUTO: 0 10*3/MM3 (ref 0–0.2)
BASOPHILS NFR BLD AUTO: 0.4 % (ref 0–1.5)
BILIRUB SERPL-MCNC: 0.4 MG/DL (ref 0–1.2)
BUN SERPL-MCNC: 18 MG/DL (ref 8–23)
BUN/CREAT SERPL: 12.6 (ref 7–25)
C PNEUM DNA NPH QL NAA+NON-PROBE: NOT DETECTED
CALCIUM SPEC-SCNC: 8.6 MG/DL (ref 8.6–10.5)
CHLORIDE SERPL-SCNC: 99 MMOL/L (ref 98–107)
CO2 SERPL-SCNC: 22 MMOL/L (ref 22–29)
CREAT SERPL-MCNC: 1.43 MG/DL (ref 0.76–1.27)
D DIMER PPP FEU-MCNC: 0.52 MG/L (FEU) (ref 0–0.59)
DEPRECATED RDW RBC AUTO: 42 FL (ref 37–54)
EOSINOPHIL # BLD AUTO: 0 10*3/MM3 (ref 0–0.4)
EOSINOPHIL NFR BLD AUTO: 0 % (ref 0.3–6.2)
ERYTHROCYTE [DISTWIDTH] IN BLOOD BY AUTOMATED COUNT: 13 % (ref 12.3–15.4)
FLUAV SUBTYP SPEC NAA+PROBE: NOT DETECTED
FLUBV RNA ISLT QL NAA+PROBE: NOT DETECTED
GFR SERPL CREATININE-BSD FRML MDRD: 50 ML/MIN/1.73
GLOBULIN UR ELPH-MCNC: 2.5 GM/DL
GLUCOSE SERPL-MCNC: 102 MG/DL (ref 65–99)
HADV DNA SPEC NAA+PROBE: NOT DETECTED
HCOV 229E RNA SPEC QL NAA+PROBE: NOT DETECTED
HCOV HKU1 RNA SPEC QL NAA+PROBE: NOT DETECTED
HCOV NL63 RNA SPEC QL NAA+PROBE: NOT DETECTED
HCOV OC43 RNA SPEC QL NAA+PROBE: NOT DETECTED
HCT VFR BLD AUTO: 37 % (ref 37.5–51)
HGB BLD-MCNC: 12.7 G/DL (ref 13–17.7)
HMPV RNA NPH QL NAA+NON-PROBE: NOT DETECTED
HPIV1 RNA SPEC QL NAA+PROBE: NOT DETECTED
HPIV2 RNA SPEC QL NAA+PROBE: NOT DETECTED
HPIV3 RNA NPH QL NAA+PROBE: NOT DETECTED
HPIV4 P GENE NPH QL NAA+PROBE: NOT DETECTED
INR PPP: 1.05 (ref 0.93–1.1)
LYMPHOCYTES # BLD AUTO: 1 10*3/MM3 (ref 0.7–3.1)
LYMPHOCYTES NFR BLD AUTO: 12.4 % (ref 19.6–45.3)
M PNEUMO IGG SER IA-ACNC: NOT DETECTED
MCH RBC QN AUTO: 31.9 PG (ref 26.6–33)
MCHC RBC AUTO-ENTMCNC: 34.4 G/DL (ref 31.5–35.7)
MCV RBC AUTO: 92.8 FL (ref 79–97)
MONOCYTES # BLD AUTO: 1.2 10*3/MM3 (ref 0.1–0.9)
MONOCYTES NFR BLD AUTO: 14.6 % (ref 5–12)
NEUTROPHILS NFR BLD AUTO: 6 10*3/MM3 (ref 1.7–7)
NEUTROPHILS NFR BLD AUTO: 72.6 % (ref 42.7–76)
NRBC BLD AUTO-RTO: 0.1 /100 WBC (ref 0–0.2)
NT-PROBNP SERPL-MCNC: 573.3 PG/ML (ref 0–900)
PLATELET # BLD AUTO: 185 10*3/MM3 (ref 140–450)
PMV BLD AUTO: 7.8 FL (ref 6–12)
POTASSIUM SERPL-SCNC: 4 MMOL/L (ref 3.5–5.2)
PROT SERPL-MCNC: 6.6 G/DL (ref 6–8.5)
PROTHROMBIN TIME: 11.6 SECONDS (ref 9.6–11.7)
RBC # BLD AUTO: 3.99 10*6/MM3 (ref 4.14–5.8)
RHINOVIRUS RNA SPEC NAA+PROBE: NOT DETECTED
RSV RNA NPH QL NAA+NON-PROBE: NOT DETECTED
SARS-COV-2 RNA NPH QL NAA+NON-PROBE: DETECTED
SODIUM SERPL-SCNC: 136 MMOL/L (ref 136–145)
TROPONIN T SERPL-MCNC: <0.01 NG/ML (ref 0–0.03)
WBC # BLD AUTO: 8.3 10*3/MM3 (ref 3.4–10.8)

## 2021-11-12 PROCEDURE — 0202U NFCT DS 22 TRGT SARS-COV-2: CPT | Performed by: PHYSICIAN ASSISTANT

## 2021-11-12 PROCEDURE — M0243 CASIRIVI AND IMDEVI INFUSION: HCPCS | Performed by: PHYSICIAN ASSISTANT

## 2021-11-12 PROCEDURE — 85730 THROMBOPLASTIN TIME PARTIAL: CPT | Performed by: PHYSICIAN ASSISTANT

## 2021-11-12 PROCEDURE — 93005 ELECTROCARDIOGRAM TRACING: CPT | Performed by: PHYSICIAN ASSISTANT

## 2021-11-12 PROCEDURE — 36415 COLL VENOUS BLD VENIPUNCTURE: CPT

## 2021-11-12 PROCEDURE — 71045 X-RAY EXAM CHEST 1 VIEW: CPT

## 2021-11-12 PROCEDURE — 85379 FIBRIN DEGRADATION QUANT: CPT | Performed by: PHYSICIAN ASSISTANT

## 2021-11-12 PROCEDURE — 63710000001 ACETAMINOPHEN 500 MG TABLET: Performed by: PHYSICIAN ASSISTANT

## 2021-11-12 PROCEDURE — 85025 COMPLETE CBC W/AUTO DIFF WBC: CPT | Performed by: PHYSICIAN ASSISTANT

## 2021-11-12 PROCEDURE — 99284 EMERGENCY DEPT VISIT MOD MDM: CPT

## 2021-11-12 PROCEDURE — A9270 NON-COVERED ITEM OR SERVICE: HCPCS | Performed by: PHYSICIAN ASSISTANT

## 2021-11-12 PROCEDURE — 85610 PROTHROMBIN TIME: CPT | Performed by: PHYSICIAN ASSISTANT

## 2021-11-12 PROCEDURE — 83880 ASSAY OF NATRIURETIC PEPTIDE: CPT | Performed by: PHYSICIAN ASSISTANT

## 2021-11-12 PROCEDURE — 25010000002 INJECTION, CASIRIVIMAB AND IMDEVIMAB, 1200 MG: Performed by: PHYSICIAN ASSISTANT

## 2021-11-12 PROCEDURE — 84484 ASSAY OF TROPONIN QUANT: CPT | Performed by: PHYSICIAN ASSISTANT

## 2021-11-12 PROCEDURE — M0243 CASIRIVI AND IMDEVI INFUSION: HCPCS

## 2021-11-12 PROCEDURE — 80053 COMPREHEN METABOLIC PANEL: CPT | Performed by: PHYSICIAN ASSISTANT

## 2021-11-12 RX ORDER — SODIUM CHLORIDE 0.9 % (FLUSH) 0.9 %
10 SYRINGE (ML) INJECTION AS NEEDED
Status: DISCONTINUED | OUTPATIENT
Start: 2021-11-12 | End: 2021-11-13 | Stop reason: HOSPADM

## 2021-11-12 RX ORDER — ACETAMINOPHEN 500 MG
1000 TABLET ORAL ONCE
Status: COMPLETED | OUTPATIENT
Start: 2021-11-12 | End: 2021-11-12

## 2021-11-12 RX ORDER — DIPHENHYDRAMINE HYDROCHLORIDE 50 MG/ML
50 INJECTION INTRAMUSCULAR; INTRAVENOUS ONCE AS NEEDED
Status: DISCONTINUED | OUTPATIENT
Start: 2021-11-12 | End: 2021-11-13 | Stop reason: HOSPADM

## 2021-11-12 RX ORDER — BENZONATATE 200 MG/1
200 CAPSULE ORAL 3 TIMES DAILY PRN
Qty: 30 CAPSULE | Refills: 0 | Status: SHIPPED | OUTPATIENT
Start: 2021-11-12 | End: 2022-04-26

## 2021-11-12 RX ORDER — SODIUM CHLORIDE 9 MG/ML
30 INJECTION, SOLUTION INTRAVENOUS ONCE
Status: DISCONTINUED | OUTPATIENT
Start: 2021-11-12 | End: 2021-11-13 | Stop reason: HOSPADM

## 2021-11-12 RX ORDER — DIPHENHYDRAMINE HCL 25 MG
50 CAPSULE ORAL ONCE AS NEEDED
Status: DISCONTINUED | OUTPATIENT
Start: 2021-11-12 | End: 2021-11-13 | Stop reason: HOSPADM

## 2021-11-12 RX ORDER — AMOXICILLIN 500 MG/1
1000 CAPSULE ORAL 2 TIMES DAILY
Qty: 28 CAPSULE | Refills: 2 | Status: SHIPPED | OUTPATIENT
Start: 2021-11-12 | End: 2021-11-19

## 2021-11-12 RX ORDER — GUAIFENESIN 600 MG/1
1200 TABLET, EXTENDED RELEASE ORAL 2 TIMES DAILY
Qty: 30 TABLET | Refills: 0 | Status: SHIPPED | OUTPATIENT
Start: 2021-11-12 | End: 2022-04-26

## 2021-11-12 RX ORDER — ALBUTEROL SULFATE 90 UG/1
2 AEROSOL, METERED RESPIRATORY (INHALATION) EVERY 4 HOURS PRN
Qty: 6.7 G | Refills: 0 | Status: SHIPPED | OUTPATIENT
Start: 2021-11-12 | End: 2023-02-09

## 2021-11-12 RX ORDER — EPINEPHRINE 1 MG/ML
0.3 INJECTION, SOLUTION, CONCENTRATE INTRAVENOUS ONCE AS NEEDED
Status: DISCONTINUED | OUTPATIENT
Start: 2021-11-12 | End: 2021-11-13 | Stop reason: HOSPADM

## 2021-11-12 RX ORDER — METHYLPREDNISOLONE SODIUM SUCCINATE 125 MG/2ML
125 INJECTION, POWDER, LYOPHILIZED, FOR SOLUTION INTRAMUSCULAR; INTRAVENOUS ONCE AS NEEDED
Status: DISCONTINUED | OUTPATIENT
Start: 2021-11-12 | End: 2021-11-13 | Stop reason: HOSPADM

## 2021-11-12 RX ADMIN — ACETAMINOPHEN 1000 MG: 500 TABLET, FILM COATED ORAL at 21:57

## 2021-11-12 RX ADMIN — CASIRIVIMAB AND IMDEVIMAB: 600; 600 INJECTION, SOLUTION, CONCENTRATE INTRAVENOUS at 23:22

## 2021-11-12 RX ADMIN — SODIUM CHLORIDE 250 ML: 9 INJECTION, SOLUTION INTRAVENOUS at 23:36

## 2021-11-12 NOTE — TELEPHONE ENCOUNTER
Caller: SEGUNDO CHARLES    Relationship: Emergency Contact    Best call back number: [phone]    What medication are you requesting: SOMETHING TO HELP TEDS SORE THROAT    What are your current symptoms: SORE THROAT    How long have you been experiencing symptoms: 2 DAYS    Have you had these symptoms before:    [x] Yes  [] No    Have you been treated for these symptoms before:   [x] Yes  [] No    If a prescription is needed, what is your preferred pharmacy and phone number: [facility] [place] [facility] [place] - [address] [place] [address] - [phone] [facility] [phone]      Additional notes:

## 2021-11-13 VITALS
BODY MASS INDEX: 32.26 KG/M2 | SYSTOLIC BLOOD PRESSURE: 113 MMHG | HEART RATE: 74 BPM | OXYGEN SATURATION: 95 % | RESPIRATION RATE: 18 BRPM | HEIGHT: 69 IN | DIASTOLIC BLOOD PRESSURE: 80 MMHG | TEMPERATURE: 98.8 F | WEIGHT: 217.81 LBS

## 2021-11-13 NOTE — ED PROVIDER NOTES
Subjective     Patient is a 61-year-old male comes in complaining of multiple complaints for the past 3 days.  Patient states that he started to feel tired and fatigued about 3 days ago with the onset of chills intermittently as well as some postnasal drip, nonproductive cough and mildly sore throat.  Patient denies any chest pain, shortness of breath, fever, nausea, vomiting, diarrhea, abdominal pain, pleuritic pain, hemoptysis or urinary symptoms.  Patient reportedly has a history of CHF and follows with Dr. Spence.  Patient states he does not have any swelling of his legs bilaterally.  Patient does report a mild headache is frontal in nature and not the worst of his life or sudden onset.  Patient states his headache is about a 3 out of 10 that is constant.  Patient reports some body aches as well and is worried that he may have Covid despite having the [employer] vaccine back in April 2021.  Patient denies any sick contacts at home.        Review of Systems   Constitutional: Positive for chills and fatigue. Negative for diaphoresis and fever.   HENT: Negative for congestion, sore throat, tinnitus and trouble swallowing.    Eyes: Negative for photophobia, discharge and visual disturbance.   Respiratory: Positive for cough. Negative for chest tightness, shortness of breath and wheezing.    Cardiovascular: Negative for chest pain, palpitations and leg swelling.   Gastrointestinal: Negative for abdominal pain, blood in stool, diarrhea, nausea and vomiting.   Genitourinary: Negative for dysuria, flank pain, frequency and urgency.   Musculoskeletal: Negative for arthralgias and myalgias.   Skin: Negative for rash.   Neurological: Positive for headaches. Negative for dizziness, syncope, speech difficulty, weakness, light-headedness and numbness.   Psychiatric/Behavioral: Negative for confusion.       Past Medical History:   Diagnosis Date   • Cardiomyopathy (HCC)    • CHF (congestive heart failure) (HCC)    •  Hyperlipidemia    • Hypertension        Allergies   Allergen Reactions   • Hydrocodone Urinary Retention       Past Surgical History:   Procedure Laterality Date   • CARDIAC CATHETERIZATION  2017   • COLONOSCOPY N/A 10/28/2021    Procedure: COLONOSCOPY;  Surgeon: AURELIA Bird MD;  Location: [facility] END[facility];  Service: Gastroenterology;  Laterality: N/A;  diverticulosis     • ENDOSCOPY N/A 10/28/2021    Procedure: ESOPHAGOGASTRODUODENOSCOPY with biopsy x2 areas and dilitation (#54 bougie);  Surgeon: AURELIA Bird MD;  Location: [facility] ENDOSCOPY;  Service: Gastroenterology;  Laterality: N/A;  erosive esophagitis;erosive gastritis esophageal  stricture   • UMBILICAL HERNIA REPAIR         Family History   Problem Relation Age of Onset   • Dementia Mother    • Cancer Father        Social History     Socioeconomic History   • Marital status:    Tobacco Use   • Smoking status: Never Smoker   • Smokeless tobacco: Never Used   Vaping Use   • Vaping Use: Never used   Substance and Sexual Activity   • Alcohol use: Never   • Drug use: Never   • Sexual activity: Defer           Objective   Physical Exam  Vitals and nursing note reviewed.   Constitutional:       General: He is not in acute distress.     Appearance: He is well-developed. He is not diaphoretic.   HENT:      Head: Normocephalic and atraumatic.      Right Ear: Ear canal and external ear normal.      Left Ear: Ear canal and external ear normal.      Nose: Nose normal.      Mouth/Throat:      Pharynx: No oropharyngeal exudate.   Eyes:      Extraocular Movements: Extraocular movements intact.      Conjunctiva/sclera: Conjunctivae normal.      Pupils: Pupils are equal, round, and reactive to light.   Cardiovascular:      Rate and Rhythm: Normal rate and regular rhythm.      Pulses: Normal pulses.      Heart sounds: Normal heart sounds.      Comments: S1, S2 audible.  Pulmonary:      Effort: Pulmonary effort is normal. No respiratory distress.      Breath  "sounds: Normal breath sounds. No wheezing, rhonchi or rales.      Comments: On room air.  Abdominal:      General: Bowel sounds are normal. There is no distension.      Palpations: Abdomen is soft.      Tenderness: There is no abdominal tenderness. There is no guarding or rebound.   Musculoskeletal:         General: No tenderness or deformity. Normal range of motion.      Cervical back: Normal range of motion.      Right lower leg: No edema.      Left lower leg: No edema.   Skin:     General: Skin is warm.      Capillary Refill: Capillary refill takes less than 2 seconds.      Findings: No erythema or rash.   Neurological:      General: No focal deficit present.      Mental Status: He is alert and oriented to person, place, and time.      Cranial Nerves: No cranial nerve deficit.      Sensory: No sensory deficit.      Motor: No weakness.   Psychiatric:         Mood and Affect: Mood normal.         Behavior: Behavior normal.         Procedures           ED Course      /71 (BP Location: Left arm, Patient Position: Sitting)   Pulse 80   Temp 99.1 °F (37.3 °C) (Oral)   Resp 18   Ht 175.3 cm (69\")   Wt 98.8 kg (217 lb 13 oz)   SpO2 92%   BMI 32.17 kg/m²   Labs Reviewed   RESPIRATORY PANEL PCR W/ COVID-19 (SARS-COV-2) DARRICK/CARO/EVIN/PAD/COR/MAD/JITENDRA IN-HOUSE, NP SWAB IN UTM/VTP, 3-4 HR TAT - Abnormal; Notable for the following components:       Result Value    COVID19 Detected (*)     All other components within normal limits    Narrative:     In the setting of a positive respiratory panel with a viral infection PLUS a negative procalcitonin without other underlying concern for bacterial infection, consider observing off antibiotics or discontinuation of antibiotics and continue supportive care. If the respiratory panel is positive for atypical bacterial infection (Bordetella pertussis, Chlamydophila pneumoniae, or Mycoplasma pneumoniae), consider antibiotic de-escalation to target atypical bacterial infection. "   COMPREHENSIVE METABOLIC PANEL - Abnormal; Notable for the following components:    Glucose 102 (*)     Creatinine 1.43 (*)     AST (SGOT) 42 (*)     eGFR Non  Amer 50 (*)     All other components within normal limits    Narrative:     GFR Normal >60  Chronic Kidney Disease <60  Kidney Failure <15     APTT - Abnormal; Notable for the following components:    PTT 31.6 (*)     All other components within normal limits   CBC WITH AUTO DIFFERENTIAL - Abnormal; Notable for the following components:    RBC 3.99 (*)     Hemoglobin 12.7 (*)     Hematocrit 37.0 (*)     Lymphocyte % 12.4 (*)     Monocyte % 14.6 (*)     Eosinophil % 0.0 (*)     Monocytes, Absolute 1.20 (*)     All other components within normal limits   BNP (IN-HOUSE) - Normal    Narrative:     Among patients with dyspnea, NT-proBNP is highly sensitive for the detection of acute congestive heart failure. In addition NT-proBNP of <300 pg/ml effectively rules out acute congestive heart failure with 99% negative predictive value.    Results may be falsely decreased if patient taking Biotin.     TROPONIN (IN-HOUSE) - Normal    Narrative:     Troponin T Reference Range:  <= 0.03 ng/mL-   Negative for AMI  >0.03 ng/mL-     Abnormal for myocardial necrosis.  Clinicians would have to utilize clinical acumen, EKG, Troponin and serial changes to determine if it is an Acute Myocardial Infarction or myocardial injury due to an underlying chronic condition.       Results may be falsely decreased if patient taking Biotin.     PROTIME-INR - Normal   D-DIMER, QUANTITATIVE - Normal    Narrative:     Reference Range  --------------------------------------------------------------------     < 0.50   Negative Predictive Value  0.50-0.59   Indeterminate    >= 0.60   Probable VTE             A very low percentage of patients with DVT may yield D-Dimer results   below the cut-off of 0.50 mg/L FEU.  This is known to be more   prevalent in patients with distal DVT.              Results of this test should always be interpreted in conjunction with   the patient's medical history, clinical presentation and other   findings.  Clinical diagnosis should not be based on the result of   INNOVANCE D-Dimer alone.   CBC AND DIFFERENTIAL    Narrative:     The following orders were created for panel order CBC & Differential.  Procedure                               Abnormality         Status                     ---------                               -----------         ------                     CBC Auto Differential[[id number]]        Abnormal            Final result                 Please view results for these tests on the individual orders.   EXTRA TUBES    Narrative:     The following orders were created for panel order Extra Tubes.  Procedure                               Abnormality         Status                     ---------                               -----------         ------                     Gold Top - SST[[id number]]                                   Final result                 Please view results for these tests on the individual orders.   GOLD TOP - SST     XR Chest 1 View    Result Date: 11/12/2021  No active disease.  Electronically Signed By-Yuriy Jiménez MD On:11/12/2021 8:58 PM This report was finalized on [id number] by  Yuriy Jiménez MD.                                         MDM    Chart review: History of CHF.  See above.  EKG: EKG reviewed by myself and interpreted by  Shows sinus rhythm 87 bpmPahner, LBBB, no ST elevation apparent, similar to prior exam.    Imaging:    XR Chest 1 View   Final Result   No active disease.       Electronically Signed By-Yuriy Jiménez MD On:11/12/2021 8:58 PM   This report was finalized on [id number] by  Yuriy Jiménez MD.          Labs: Serum creatinine elevated at 1.43.  Otherwise unremarkable CMP.  COVID-19 swab positive otherwise unremarkable respiratory viral panel.  Initial troponin negative.  proBNP normal.  Coags unremarkable.   "D-dimer negative.  CBC largely unremarkable.  Vitals:  /71 (BP Location: Left arm, Patient Position: Sitting)   Pulse 80   Temp 99.1 °F (37.3 °C) (Oral)   Resp 18   Ht 175.3 cm (69\")   Wt 98.8 kg (217 lb 13 oz)   SpO2 92%   BMI 32.17 kg/m²     Medications given:    Medications   sodium chloride 0.9 % flush 10 mL (has no administration in time range)   INV casirivimab / imdevimab 1200 mg in 60 mL NS IVPB (premix) (has no administration in time range)   EPINEPHrine PF (ADRENALIN) injection 0.3 mg (has no administration in time range)   diphenhydrAMINE (BENADRYL) capsule 50 mg (has no administration in time range)     Or   diphenhydrAMINE (BENADRYL) injection 50 mg (has no administration in time range)   methylPREDNISolone sodium succinate (SOLU-Medrol) injection 125 mg (has no administration in time range)   sodium chloride 0.9 % infusion 30 mL (has no administration in time range)   sodium chloride 0.9 % bolus 250 mL (has no administration in time range)   acetaminophen (TYLENOL) tablet 1,000 mg (1,000 mg Oral Given 11/12/21 2157)       Procedures:    MDM: Patient is a 61-year-old male comes in complaining of fatigue congestion and cough.  Patient was positive for COVID-19 but otherwise unremarkable respiratory viral panel.  Chest x-ray unremarkable for acute findings.  EKG and initial troponin negative.  D-dimer was negative and patient is otherwise low risk Wells score criteria for PE and unlikely to have a clot at this time as patient was not hypoxic or complaining of shortness of breath or chest pain at this time.  Serum creatinine slightly bumped consistent with an DIPIKA at 1.43.  Patient was given a very gentle fluid bolus of 250 mL for this.  Patient otherwise has unremarkable CBC and CMP.  I discussed with patient that he qualifies for Regeneron monoclonal antibody for COVID-19 and I discussed with patient the risks and benefits of the treatment and patient would like to proceed with treatment.  " Patient was also given the fact sheet regarding information about the monoclonal antibody.  See full discharge instructions for further details. Results and plan discussed with patient and is agreeable with plan.    The FDA has authorized the emergency use of casirivimab and imdevimab, which is not an FDA approved drug. Discussions with the patient regarding the risks and benefits of casirivimab and imdevimab have occurred. The patient recognizes that this is an investigational treatment which may offer significant known and potential benefits and risks, the extent of which are unknown. Information on available alternative treatments and the risks and benefits of those alternatives was discussed. The patient received the “Fact Sheet for Patients Parents and Caregivers”. All questions from the patient were answered to satisfaction. The patient has the option to accept or refuse treatment with casirivimab and imdevimab and would like to accept treatment.    Counseling regarding continued self isolation and use of infection control measures according to the CDC guidelines has occurred.    Final diagnoses:   COVID-19   Fatigue, unspecified type   DIPIKA (acute kidney injury) ([facility])       ED Disposition  ED Disposition     ED Disposition Condition Comment    Discharge Stable           [facility] EMERGENCY DEPARTMENT  [address] [address] [phone]  Go in 1 day  As needed, If symptoms worsen or if your oxygenation falls under 90% on home pulse oximeter.    Nathan Stout MD  [address]  [place] [address] [phone]    Call in 1 week  As needed         Medication List      New Prescriptions    albuterol sulfate  (90 Base) MCG/ACT inhaler  Commonly known as: PROVENTIL HFA;VENTOLIN HFA;PROAIR HFA  Inhale 2 puffs Every 4 (Four) Hours As Needed for Wheezing or Shortness of Air.     benzonatate 200 MG capsule  Commonly known as: TESSALON  Take 1 capsule by  mouth 3 (Three) Times a Day As Needed for Cough.     guaiFENesin 600 MG 12 hr tablet  Commonly known as: MUCINEX  Take 2 tablets by mouth 2 (Two) Times a Day.           Where to Get Your Medications      These medications were sent to [facility], [place] - [address] - [phone]  - [phone] FX  [address] [place] [address]    Phone: [phone]   · albuterol sulfate  (90 Base) MCG/ACT inhaler  · benzonatate 200 MG capsule  · guaiFENesin 600 MG 12 hr tablet          Octavio Sanchez PA  11/12/21 8561

## 2021-11-13 NOTE — ED NOTES
Pt ambulated in room with pulse ox in place, o2 sats at 98% while ambulating, . Pt denies any SOA or CP. Does report slight with dizziness with position change. Provider notified.     Izabella Wetzel RN  11/12/21 0865

## 2021-11-13 NOTE — DISCHARGE INSTRUCTIONS
Please use Tylenol as needed for fever and pain control.  Please avoid ibuprofen at this time.  Please continue to hydrate with liquids.  Please purchase a pulse oximeter to monitor your oxygenation levels at home and if you fall below 90% this would be reason to come back to the ER for further evaluation.  Please come back to the ER if you have severe chest pain or shortness of breath as you will need reevaluation at that time as well.  Please quarantine for 10 days from onset of symptoms.  Please follow-up with your primary care provider as needed.  Can use albuterol as needed for shortness of breath and wheezing as well.  Can use Tessalon Perles for cough suppression and Mucinex as well to help with congestion.

## 2021-11-15 ENCOUNTER — READMISSION MANAGEMENT (OUTPATIENT)
Dept: CALL CENTER | Facility: HOSPITAL | Age: 61
End: 2021-11-15

## 2021-11-15 NOTE — OUTREACH NOTE
Prep Survey      Responses   [facility] patient discharged from? [facility]   Is LACE score < 7 ? No   Emergency Room discharge w/ pulse ox? Yes   Eligibility Readm Mgmt   Discharge diagnosis Covid positive 11/12/2021 ED   Does the patient have one of the following disease processes/diagnoses(primary or secondary)? COVID-19   Does the patient have Home health ordered? No   Is there a DME ordered? Yes   What DME was ordered? Pulse Ox given w/ instructions   General alerts for this patient ED d/c call x 3 only   Prep survey completed? Yes          Mare Pan RN

## 2021-11-15 NOTE — OUTREACH NOTE
COVID-19 Week 1 Survey      Responses   [facility] patient discharged from? [facility]   Does the patient have one of the following disease processes/diagnoses(primary or secondary)? COVID-19   COVID-19 underlying condition? None   Call Number Call 1   Week 1 Call successful? Yes   Call start time 1034   Call end time 1034  [patient was asleep according to the person who answered the phone]   Discharge diagnosis ARCHANA Mccauley RN

## 2021-11-16 ENCOUNTER — TELEPHONE (OUTPATIENT)
Dept: FAMILY MEDICINE CLINIC | Facility: CLINIC | Age: 61
End: 2021-11-16

## 2021-11-16 ENCOUNTER — READMISSION MANAGEMENT (OUTPATIENT)
Dept: CALL CENTER | Facility: HOSPITAL | Age: 61
End: 2021-11-16

## 2021-11-16 LAB — QT INTERVAL: 381 MS

## 2021-11-16 NOTE — OUTREACH NOTE
COVID-19 Week 1 Survey      Responses   [facility], [place], [facility] by [facility] patient discharged from? [facility]   Does the patient have one of the following disease processes/diagnoses(primary or secondary)? COVID-19   COVID-19 underlying condition? None   Call Number Call 2   Week 1 Call successful? Yes   Call start time 1111   Call end time 1115   Discharge diagnosis COVID   Meds reviewed with patient/caregiver? Yes   Is the patient having any side effects they believe may be caused by any medication additions or changes? No   Does the patient have all medications ordered at discharge? Yes   Is the patient taking all medications as directed (includes completed medication regime)? Yes   Does the patient have a primary care provider?  Yes   Comments regarding PCP Spouse make PCP appt today   Does the patient have an appointment with their PCP or specialist within 7 days of discharge? No   What is preventing the patient from scheduling follow up appointments within 7 days of discharge? Haven't had time   Nursing Interventions Educated patient on importance of making appointment,  Advised patient to make appointment   Has the patient kept scheduled appointments due by today? N/A   What DME was ordered? Pulse Ox given w/ instructions   Has all DME been delivered? Yes   Psychosocial issues? No   Did the patient receive a copy of their discharge instructions? Yes   Did the patient receive a copy of COVID-19 specific instructions? Yes   Nursing interventions Reviewed instructions with patient   What is the patient's perception of their health status since discharge? Improving   Does the patient have any of the following symptoms? None   Nursing Interventions Nurse provided patient education   Pulse Ox monitoring Intermittent   Pulse Ox device source Hospital   O2 Sat comments 95% RA   O2 Sat: education provided Sat levels,  Monitoring frequency,  When to seek care   O2 Sat education comments sats remaining below 90% call 911   Is the  patient/caregiver able to teach back steps to recovery at home? Rest and rebuild strength, gradually increase activity,  Eat a well-balance diet   If the patient is a current smoker, are they able to teach back resources for cessation? Not a smoker   Is the patient/caregiver able to teach back the hierarchy of who to call/visit for symptoms/problems? PCP, Specialist, Home health nurse, Urgent Care, ED, 911 Yes   COVID-19 call completed? Yes   Wrap up additional comments Spouse states pt is doing better,  reports no s/s covid. No questions/concerns.          Georgina Lindquist RN

## 2021-11-16 NOTE — TELEPHONE ENCOUNTER
Caller: Vianney Jean-Bpatiste    Relationship to patient: WIFE    Best call back number: [phone]    Chief complaint: COVID     Type of visit: HOSPITAL FOLLOW UP    [facility] EMERGENCY ROOM 11/12/2021    Requested date: AS SOON AS POSSIBLE     Additional notes:PATIENT ONLY WANTS TO SEE DR GARZA.

## 2021-11-17 ENCOUNTER — READMISSION MANAGEMENT (OUTPATIENT)
Dept: CALL CENTER | Facility: HOSPITAL | Age: 61
End: 2021-11-17

## 2021-11-17 NOTE — OUTREACH NOTE
"COVID-19 Week 1 Survey      Responses   [facility], [place], [facility] by [facility] patient discharged from? [facility]   Does the patient have one of the following disease processes/diagnoses(primary or secondary)? COVID-19   COVID-19 underlying condition? None   Call Number Call 3   Week 1 Call successful? Yes   Call start time 1202   Call end time 1205   Meds reviewed with patient/caregiver? Yes   Is the patient having any side effects they believe may be caused by any medication additions or changes? No   Does the patient have all medications ordered at discharge? Yes   Is the patient taking all medications as directed (includes completed medication regime)? Yes   Comments regarding appointments PCP appt 11/18/21.   Does the patient have a primary care provider?  Yes   Does the patient have an appointment with their PCP or specialist within 7 days of discharge? Yes   Has the patient kept scheduled appointments due by today? N/A   What is the patient's perception of their health status since discharge? Improving   Does the patient have any of the following symptoms? Cough  [Slight dry cough.]   Pulse Ox monitoring Intermittent   Pulse Ox device source Hospital   O2 Sat comments Wife states O2 sats \"are staying good\".   Is the patient/caregiver able to teach back the hierarchy of who to call/visit for symptoms/problems? PCP, Specialist, Home health nurse, Urgent Care, ED, 911 Yes   COVID-19 call completed? Yes   Revoked No further contact(revokes)-requires comment   Is the patient interested in additional calls from an ambulatory ?  NOTE:  applies to high risk patients requiring additional follow-up. No   Graduated/Revoked comments 3rd call from ER visit.   Wrap up additional comments Wife states patient is improving. States has slight cough with no other s/s. Denies any needs today. [facility] will keep appt with PCP tomorrow.          Sienna Sheridan RN  "

## 2021-11-18 ENCOUNTER — OFFICE VISIT (OUTPATIENT)
Dept: FAMILY MEDICINE CLINIC | Facility: CLINIC | Age: 61
End: 2021-11-18

## 2021-11-18 VITALS
RESPIRATION RATE: 16 BRPM | DIASTOLIC BLOOD PRESSURE: 64 MMHG | SYSTOLIC BLOOD PRESSURE: 100 MMHG | OXYGEN SATURATION: 97 % | HEART RATE: 63 BPM | TEMPERATURE: 96.9 F

## 2021-11-18 DIAGNOSIS — Q20.3 TRANSPOSITION GREAT ARTERIES: ICD-10-CM

## 2021-11-18 DIAGNOSIS — Z23 NEED FOR VACCINATION: Primary | ICD-10-CM

## 2021-11-18 DIAGNOSIS — U07.1 COVID-19 VIRUS INFECTION: ICD-10-CM

## 2021-11-18 DIAGNOSIS — I42.9 CARDIOMYOPATHY, UNSPECIFIED TYPE (HCC): Chronic | ICD-10-CM

## 2021-11-18 PROBLEM — Q24.5 ANOMALOUS CORONARY ARTERY ORIGIN: Status: ACTIVE | Noted: 2021-10-23

## 2021-11-18 PROBLEM — M51.36 DEGENERATION OF LUMBAR INTERVERTEBRAL DISC: Status: ACTIVE | Noted: 2017-10-13

## 2021-11-18 PROBLEM — M51.369 DEGENERATION OF LUMBAR INTERVERTEBRAL DISC: Status: ACTIVE | Noted: 2017-10-13

## 2021-11-18 PROBLEM — Q24.5 CORONARY-MYOCARDIAL BRIDGE: Status: ACTIVE | Noted: 2021-10-23

## 2021-11-18 PROBLEM — M19.90 ARTHRITIS: Status: ACTIVE | Noted: 2018-03-01

## 2021-11-18 PROBLEM — M19.012 OSTEOARTHRITIS OF LEFT ACROMIOCLAVICULAR JOINT: Status: ACTIVE | Noted: 2020-06-25

## 2021-11-18 PROBLEM — I50.9 CONGESTIVE HEART FAILURE: Status: ACTIVE | Noted: 2018-03-01

## 2021-11-18 PROBLEM — M75.100 ROTATOR CUFF SYNDROME: Status: ACTIVE | Noted: 2018-03-01

## 2021-11-18 PROBLEM — M17.12 OSTEOARTHRITIS OF LEFT KNEE: Status: ACTIVE | Noted: 2020-09-14

## 2021-11-18 PROBLEM — M17.11 OSTEOARTHRITIS OF RIGHT KNEE: Status: ACTIVE | Noted: 2018-05-17

## 2021-11-18 PROCEDURE — 90686 IIV4 VACC NO PRSV 0.5 ML IM: CPT | Performed by: FAMILY MEDICINE

## 2021-11-18 PROCEDURE — G0008 ADMIN INFLUENZA VIRUS VAC: HCPCS | Performed by: FAMILY MEDICINE

## 2021-11-18 PROCEDURE — 99213 OFFICE O/P EST LOW 20 MIN: CPT | Performed by: FAMILY MEDICINE

## 2021-11-18 RX ORDER — SPIRONOLACTONE 25 MG/1
TABLET ORAL
COMMUNITY
Start: 2021-10-04

## 2021-11-18 NOTE — PROGRESS NOTES
Chief Complaint  Exposure To Known Illness (f/u)    Subjective          Jens Jean-Baptiste presents to [facility]  For a follow up from covid 19 and had infusion on 11/12/21 through ER.  Jens is a 61-year-old white male with cardiomyopathy from congenital heart disease.  Jens was extremely fatigued last week while he was working on putting in some deana for one of his relatives.  By Friday he could tell he was probably coming down with some sort of illness and he developed a fever.  He went to the emergency room because of his immunocompromise state and was found to be Covid positive.  He was treated with medical antibodies and because he was otherwise doing well he was discharged home.  By the time they were finished with the infusion he was afebrile and he felt stronger.  He had no further fevers from that point forward and he was eating and drinking well.  He was asked to follow-up with me today to make sure things are moving well      Objective   Vital Signs:   /64 (BP Location: Right arm)   Pulse 63   Temp 96.9 °F (36.1 °C) (Infrared)   Resp 16   SpO2 97%     Physical Exam  Constitutional:       Appearance: Normal appearance. He is well-developed and normal weight.   HENT:      Head: Normocephalic and atraumatic.      Right Ear: Tympanic membrane, ear canal and external ear normal.      Left Ear: Tympanic membrane, ear canal and external ear normal.      Nose: Nose normal.      Mouth/Throat:      Mouth: Mucous membranes are moist.      Pharynx: Oropharynx is clear. No oropharyngeal exudate.   Eyes:      Extraocular Movements: Extraocular movements intact.      Conjunctiva/sclera: Conjunctivae normal.      Pupils: Pupils are equal, round, and reactive to light.   Cardiovascular:      Rate and Rhythm: Normal rate and regular rhythm.      Pulses: Normal pulses.      Heart sounds: Normal heart sounds.      Comments: S3-S4 gallop.  Patient has cardiomyopathy congenital  heart disease based on quite well with it and I do not hear any significant pulmonary congestion  Pulmonary:      Effort: Pulmonary effort is normal.      Breath sounds: Normal breath sounds.   Abdominal:      General: Bowel sounds are normal.      Palpations: Abdomen is soft.   Musculoskeletal:         General: Normal range of motion.      Cervical back: Normal range of motion and neck supple.   Skin:     General: Skin is warm and dry.   Neurological:      General: No focal deficit present.      Mental Status: He is alert and oriented to person, place, and time. Mental status is at baseline.   Psychiatric:         Mood and Affect: Mood normal.         Behavior: Behavior normal.         Thought Content: Thought content normal.         Judgment: Judgment normal.        Result Review :                 Assessment and Plan    Diagnoses and all orders for this visit:    1. Need for vaccination (Primary)  -     FluLaval/Fluarix/Fluzone >6 Months    2. Cardiomyopathy, unspecified type (HCC)  Assessment & Plan:  Patient was long-term cardiomyopathy.  High risk for arrhythmia so he does have a defibrillator pacemaker in place.  He is done very well over the years and developed Covid last week.  Fortunately did not get down into his lungs because he had a Covid vaccine a few months ago.  I think it a few more months would be a good idea for him to get a booster shot.  In the meantime we will give him a flu vaccine today since he is feeling so well.  He can return to full unrestricted activities      3. Transposition great arteries  Assessment & Plan:  Surgically corrected but patient left with persistent cardiomyopathy.      4. COVID-19 virus infection  Assessment & Plan:  Patient developed fever weakness and cough and was found to be Covid virus positive.  He was treated with IV fluids and the infusion of monoclonal antibodies.  He improved with supportive care only and was here today to be checked out and released to full  activity if appropriate.  I do not see anything on exam today that would make me think he is going to have any more trouble.  I think he can go back to full unrestricted activity.  If for some reason he begins to feel sick again he needs to contact me immediately.        Follow Up   Return in about 6 months (around 5/18/2022) for Recheck.  Patient was given instructions and counseling regarding his condition or for health maintenance advice. Please see specific information pulled into the AVS if appropriate.

## 2021-11-19 NOTE — ASSESSMENT & PLAN NOTE
Patient was long-term cardiomyopathy.  High risk for arrhythmia so he does have a defibrillator pacemaker in place.  He is done very well over the years and developed Covid last week.  Fortunately did not get down into his lungs because he had a Covid vaccine a few months ago.  I think it a few more months would be a good idea for him to get a booster shot.  In the meantime we will give him a flu vaccine today since he is feeling so well.  He can return to full unrestricted activities

## 2021-11-19 NOTE — ASSESSMENT & PLAN NOTE
Patient developed fever weakness and cough and was found to be Covid virus positive.  He was treated with IV fluids and the infusion of monoclonal antibodies.  He improved with supportive care only and was here today to be checked out and released to full activity if appropriate.  I do not see anything on exam today that would make me think he is going to have any more trouble.  I think he can go back to full unrestricted activity.  If for some reason he begins to feel sick again he needs to contact me immediately.

## 2022-02-07 ENCOUNTER — TRANSCRIBE ORDERS (OUTPATIENT)
Dept: ADMINISTRATIVE | Facility: HOSPITAL | Age: 62
End: 2022-02-07

## 2022-02-07 ENCOUNTER — LAB (OUTPATIENT)
Dept: LAB | Facility: HOSPITAL | Age: 62
End: 2022-02-07

## 2022-02-07 DIAGNOSIS — I50.20 SYSTOLIC CONGESTIVE HEART FAILURE, UNSPECIFIED HF CHRONICITY: Primary | ICD-10-CM

## 2022-02-07 DIAGNOSIS — I50.20 SYSTOLIC CONGESTIVE HEART FAILURE, UNSPECIFIED HF CHRONICITY: ICD-10-CM

## 2022-02-07 LAB
ANION GAP SERPL CALCULATED.3IONS-SCNC: 7.6 MMOL/L (ref 5–15)
BUN SERPL-MCNC: 18 MG/DL (ref 8–23)
BUN/CREAT SERPL: 12 (ref 7–25)
CALCIUM SPEC-SCNC: 9.6 MG/DL (ref 8.6–10.5)
CHLORIDE SERPL-SCNC: 105 MMOL/L (ref 98–107)
CO2 SERPL-SCNC: 25.4 MMOL/L (ref 22–29)
CREAT SERPL-MCNC: 1.5 MG/DL (ref 0.76–1.27)
GFR SERPL CREATININE-BSD FRML MDRD: 48 ML/MIN/1.73
GLUCOSE SERPL-MCNC: 89 MG/DL (ref 65–99)
POTASSIUM SERPL-SCNC: 4.5 MMOL/L (ref 3.5–5.2)
SODIUM SERPL-SCNC: 138 MMOL/L (ref 136–145)

## 2022-02-07 PROCEDURE — 36415 COLL VENOUS BLD VENIPUNCTURE: CPT

## 2022-02-07 PROCEDURE — 80048 BASIC METABOLIC PNL TOTAL CA: CPT

## 2022-02-26 ENCOUNTER — TRANSCRIBE ORDERS (OUTPATIENT)
Dept: ADMINISTRATIVE | Facility: HOSPITAL | Age: 62
End: 2022-02-26

## 2022-02-26 ENCOUNTER — LAB (OUTPATIENT)
Dept: LAB | Facility: HOSPITAL | Age: 62
End: 2022-02-26

## 2022-02-26 DIAGNOSIS — I50.42 CHRONIC COMBINED SYSTOLIC AND DIASTOLIC HEART FAILURE: Primary | ICD-10-CM

## 2022-02-26 DIAGNOSIS — I50.42 CHRONIC COMBINED SYSTOLIC AND DIASTOLIC HEART FAILURE: ICD-10-CM

## 2022-02-26 LAB
ANION GAP SERPL CALCULATED.3IONS-SCNC: 11 MMOL/L (ref 5–15)
BUN SERPL-MCNC: 19 MG/DL (ref 8–23)
BUN/CREAT SERPL: 17.4 (ref 7–25)
CALCIUM SPEC-SCNC: 9.7 MG/DL (ref 8.6–10.5)
CHLORIDE SERPL-SCNC: 102 MMOL/L (ref 98–107)
CO2 SERPL-SCNC: 24 MMOL/L (ref 22–29)
CREAT SERPL-MCNC: 1.09 MG/DL (ref 0.76–1.27)
GFR SERPL CREATININE-BSD FRML MDRD: 69 ML/MIN/1.73
GLUCOSE SERPL-MCNC: 81 MG/DL (ref 65–99)
POTASSIUM SERPL-SCNC: 4.9 MMOL/L (ref 3.5–5.2)
SODIUM SERPL-SCNC: 137 MMOL/L (ref 136–145)

## 2022-02-26 PROCEDURE — 80048 BASIC METABOLIC PNL TOTAL CA: CPT

## 2022-02-26 PROCEDURE — 36415 COLL VENOUS BLD VENIPUNCTURE: CPT

## 2022-03-31 ENCOUNTER — TRANSCRIBE ORDERS (OUTPATIENT)
Dept: ADMINISTRATIVE | Facility: HOSPITAL | Age: 62
End: 2022-03-31

## 2022-03-31 ENCOUNTER — LAB (OUTPATIENT)
Dept: LAB | Facility: HOSPITAL | Age: 62
End: 2022-03-31

## 2022-03-31 DIAGNOSIS — I50.42 CHRONIC COMBINED SYSTOLIC AND DIASTOLIC CHF (CONGESTIVE HEART FAILURE): Primary | ICD-10-CM

## 2022-03-31 DIAGNOSIS — I50.42 CHRONIC COMBINED SYSTOLIC AND DIASTOLIC CHF (CONGESTIVE HEART FAILURE): ICD-10-CM

## 2022-03-31 LAB
ANION GAP SERPL CALCULATED.3IONS-SCNC: 8.7 MMOL/L (ref 5–15)
BUN SERPL-MCNC: 20 MG/DL (ref 8–23)
BUN/CREAT SERPL: 15.6 (ref 7–25)
CALCIUM SPEC-SCNC: 9.1 MG/DL (ref 8.6–10.5)
CHLORIDE SERPL-SCNC: 104 MMOL/L (ref 98–107)
CO2 SERPL-SCNC: 24.3 MMOL/L (ref 22–29)
CREAT SERPL-MCNC: 1.28 MG/DL (ref 0.76–1.27)
EGFRCR SERPLBLD CKD-EPI 2021: 63.7 ML/MIN/1.73
GLUCOSE SERPL-MCNC: 97 MG/DL (ref 65–99)
POTASSIUM SERPL-SCNC: 4.3 MMOL/L (ref 3.5–5.2)
SODIUM SERPL-SCNC: 137 MMOL/L (ref 136–145)

## 2022-03-31 PROCEDURE — 36415 COLL VENOUS BLD VENIPUNCTURE: CPT

## 2022-03-31 PROCEDURE — 80048 BASIC METABOLIC PNL TOTAL CA: CPT

## 2022-04-04 ENCOUNTER — LAB (OUTPATIENT)
Dept: LAB | Facility: HOSPITAL | Age: 62
End: 2022-04-04

## 2022-04-04 ENCOUNTER — TRANSCRIBE ORDERS (OUTPATIENT)
Dept: ADMINISTRATIVE | Facility: HOSPITAL | Age: 62
End: 2022-04-04

## 2022-04-04 DIAGNOSIS — Z20.822 ENCOUNTER FOR SCREENING LABORATORY TESTING FOR COVID-19 VIRUS: Primary | ICD-10-CM

## 2022-04-04 DIAGNOSIS — Z20.822 ENCOUNTER FOR SCREENING LABORATORY TESTING FOR COVID-19 VIRUS: ICD-10-CM

## 2022-04-04 PROCEDURE — 0202U NFCT DS 22 TRGT SARS-COV-2: CPT

## 2022-04-12 ENCOUNTER — APPOINTMENT (OUTPATIENT)
Dept: GENERAL RADIOLOGY | Facility: HOSPITAL | Age: 62
End: 2022-04-12

## 2022-04-12 ENCOUNTER — HOSPITAL ENCOUNTER (EMERGENCY)
Facility: HOSPITAL | Age: 62
Discharge: HOME | End: 2022-04-12
Attending: EMERGENCY MEDICINE | Admitting: EMERGENCY MEDICINE

## 2022-04-12 VITALS
HEART RATE: 71 BPM | HEIGHT: 69 IN | DIASTOLIC BLOOD PRESSURE: 72 MMHG | TEMPERATURE: 98 F | WEIGHT: 195 LBS | RESPIRATION RATE: 16 BRPM | OXYGEN SATURATION: 98 % | SYSTOLIC BLOOD PRESSURE: 125 MMHG | BODY MASS INDEX: 28.88 KG/M2

## 2022-04-12 DIAGNOSIS — M25.522 LEFT ELBOW PAIN: ICD-10-CM

## 2022-04-12 DIAGNOSIS — M70.32 BURSITIS OF LEFT ELBOW, UNSPECIFIED BURSA: Primary | ICD-10-CM

## 2022-04-12 LAB
ANION GAP SERPL CALCULATED.3IONS-SCNC: 12 MMOL/L (ref 5–15)
BASOPHILS # BLD AUTO: 0 10*3/MM3 (ref 0–0.2)
BASOPHILS NFR BLD AUTO: 0.3 % (ref 0–1.5)
BUN SERPL-MCNC: 27 MG/DL (ref 8–23)
BUN/CREAT SERPL: 25.7 (ref 7–25)
CALCIUM SPEC-SCNC: 8.9 MG/DL (ref 8.6–10.5)
CHLORIDE SERPL-SCNC: 106 MMOL/L (ref 98–107)
CO2 SERPL-SCNC: 22 MMOL/L (ref 22–29)
CREAT SERPL-MCNC: 1.05 MG/DL (ref 0.76–1.27)
CRP SERPL-MCNC: 1.95 MG/DL (ref 0–0.5)
DEPRECATED RDW RBC AUTO: 42 FL (ref 37–54)
EGFRCR SERPLBLD CKD-EPI 2021: 80.8 ML/MIN/1.73
EOSINOPHIL # BLD AUTO: 0 10*3/MM3 (ref 0–0.4)
EOSINOPHIL NFR BLD AUTO: 0.4 % (ref 0.3–6.2)
ERYTHROCYTE [DISTWIDTH] IN BLOOD BY AUTOMATED COUNT: 12.9 % (ref 12.3–15.4)
ERYTHROCYTE [SEDIMENTATION RATE] IN BLOOD: 14 MM/HR (ref 0–20)
GLUCOSE SERPL-MCNC: 95 MG/DL (ref 65–99)
HCT VFR BLD AUTO: 37.4 % (ref 37.5–51)
HGB BLD-MCNC: 12.9 G/DL (ref 13–17.7)
LYMPHOCYTES # BLD AUTO: 1.5 10*3/MM3 (ref 0.7–3.1)
LYMPHOCYTES NFR BLD AUTO: 18.8 % (ref 19.6–45.3)
MCH RBC QN AUTO: 32.5 PG (ref 26.6–33)
MCHC RBC AUTO-ENTMCNC: 34.6 G/DL (ref 31.5–35.7)
MCV RBC AUTO: 93.9 FL (ref 79–97)
MONOCYTES # BLD AUTO: 0.8 10*3/MM3 (ref 0.1–0.9)
MONOCYTES NFR BLD AUTO: 10.6 % (ref 5–12)
NEUTROPHILS NFR BLD AUTO: 5.6 10*3/MM3 (ref 1.7–7)
NEUTROPHILS NFR BLD AUTO: 69.9 % (ref 42.7–76)
NRBC BLD AUTO-RTO: 0.1 /100 WBC (ref 0–0.2)
PLATELET # BLD AUTO: 150 10*3/MM3 (ref 140–450)
PMV BLD AUTO: 7.6 FL (ref 6–12)
POTASSIUM SERPL-SCNC: 4.7 MMOL/L (ref 3.5–5.2)
RBC # BLD AUTO: 3.98 10*6/MM3 (ref 4.14–5.8)
SODIUM SERPL-SCNC: 140 MMOL/L (ref 136–145)
URATE SERPL-MCNC: 4.6 MG/DL (ref 3.4–7)
WBC NRBC COR # BLD: 8 10*3/MM3 (ref 3.4–10.8)

## 2022-04-12 PROCEDURE — 86140 C-REACTIVE PROTEIN: CPT | Performed by: NURSE PRACTITIONER

## 2022-04-12 PROCEDURE — 73080 X-RAY EXAM OF ELBOW: CPT

## 2022-04-12 PROCEDURE — 25010000002 METHYLPREDNISOLONE PER 125 MG: Performed by: NURSE PRACTITIONER

## 2022-04-12 PROCEDURE — 96372 THER/PROPH/DIAG INJ SC/IM: CPT

## 2022-04-12 PROCEDURE — 36415 COLL VENOUS BLD VENIPUNCTURE: CPT

## 2022-04-12 PROCEDURE — 80048 BASIC METABOLIC PNL TOTAL CA: CPT | Performed by: NURSE PRACTITIONER

## 2022-04-12 PROCEDURE — 99283 EMERGENCY DEPT VISIT LOW MDM: CPT

## 2022-04-12 PROCEDURE — 85652 RBC SED RATE AUTOMATED: CPT | Performed by: NURSE PRACTITIONER

## 2022-04-12 PROCEDURE — 85025 COMPLETE CBC W/AUTO DIFF WBC: CPT | Performed by: NURSE PRACTITIONER

## 2022-04-12 PROCEDURE — 84550 ASSAY OF BLOOD/URIC ACID: CPT | Performed by: NURSE PRACTITIONER

## 2022-04-12 RX ORDER — METHYLPREDNISOLONE SODIUM SUCCINATE 125 MG/2ML
125 INJECTION, POWDER, LYOPHILIZED, FOR SOLUTION INTRAMUSCULAR; INTRAVENOUS ONCE
Status: COMPLETED | OUTPATIENT
Start: 2022-04-12 | End: 2022-04-12

## 2022-04-12 RX ORDER — METHYLPREDNISOLONE 4 MG/1
TABLET ORAL
Qty: 21 TABLET | Refills: 0 | Status: SHIPPED | OUTPATIENT
Start: 2022-04-12 | End: 2022-04-26

## 2022-04-12 RX ORDER — CEPHALEXIN 500 MG/1
500 CAPSULE ORAL 3 TIMES DAILY
Qty: 21 CAPSULE | Refills: 0 | Status: SHIPPED | OUTPATIENT
Start: 2022-04-12 | End: 2022-04-19

## 2022-04-12 RX ORDER — TRAMADOL HYDROCHLORIDE 50 MG/1
50 TABLET ORAL ONCE
Status: COMPLETED | OUTPATIENT
Start: 2022-04-12 | End: 2022-04-12

## 2022-04-12 RX ADMIN — TRAMADOL HYDROCHLORIDE 50 MG: 50 TABLET, COATED ORAL at 18:17

## 2022-04-12 RX ADMIN — METHYLPREDNISOLONE SODIUM SUCCINATE 125 MG: 125 INJECTION, POWDER, FOR SOLUTION INTRAMUSCULAR; INTRAVENOUS at 19:57

## 2022-04-13 NOTE — ED PROVIDER NOTES
Subjective   Patient is a 61-year-old white male with history of hypertension, cholesterol, cardiomyopathy.  States he underwent AICD placement 5 days ago.  Reports no complication from procedure.  He presents today with complaints of pain in his left elbow that is worse with movement and and palpation.  He states he has a history of bursitis in the elbow about 4 years ago and the symptoms feel very similar.  He reports no prior history of gout.  States it is tenderness to touch painful with movement and feels swollen.  He denies any redness or fever.  He denies any known injury or trauma.  He denies numbness tingling or weakness distal to the elbow.          Review of Systems   Constitutional: Negative for chills and fever.   Respiratory: Negative for shortness of breath.    Cardiovascular: Negative for chest pain.   Musculoskeletal:        Left elbow pain   Neurological: Negative for weakness and numbness.       Past Medical History:   Diagnosis Date   • Cardiomyopathy (HCC)    • CHF (congestive heart failure) (HCC)    • Hyperlipidemia    • Hypertension        Allergies   Allergen Reactions   • Hydrocodone Urinary Retention       Past Surgical History:   Procedure Laterality Date   • CARDIAC CATHETERIZATION  2017   • COLONOSCOPY N/A 10/28/2021    Procedure: COLONOSCOPY;  Surgeon: AURELIA Bird MD;  Location: [facility] ENDOSCOPY;  Service: Gastroenterology;  Laterality: N/A;  diverticulosis     • ENDOSCOPY N/A 10/28/2021    Procedure: ESOPHAGOGASTRODUODENOSCOPY with biopsy x2 areas and dilitation (#54 bougie);  Surgeon: AURELIA Bird MD;  Location: [facility] ENDOSCOPY;  Service: Gastroenterology;  Laterality: N/A;  erosive esophagitis;erosive gastritis esophageal  stricture   • UMBILICAL HERNIA REPAIR         Family History   Problem Relation Age of Onset   • Dementia Mother    • Cancer Father        Social History     Socioeconomic History   • Marital status:    Tobacco Use   • Smoking status: Never  Smoker   • Smokeless tobacco: Never Used   Vaping Use   • Vaping Use: Never used   Substance and Sexual Activity   • Alcohol use: Never   • Drug use: Never   • Sexual activity: Defer           Objective   Physical Exam  Vital signs and triage nurse note reviewed.  Constitutional: Awake, alert; well-developed and well-nourished. No acute distress is noted.  Cardiovascular: Regular rate and rhythm  Pulmonary: Respiratory effort regular nonlabored  Musculoskeletal: Independent range of motion of all extremities.  There is decreased range of motion of the left elbow due to pain.  There is tenderness over the posterior aspect of the left elbow and olecranon.  Questionable erythema.  No open wounds.  Scant edema posteriorly.  There is no streaking.  There is good cap refill and sensation distally.  Neuro: Alert oriented x3, speech is clear and appropriate, GCS 15.    Skin: Flesh tone, warm, dry, intact; no erythematous or petechial rash or lesion.      Procedures           ED Course      Labs Reviewed   BASIC METABOLIC PANEL - Abnormal; Notable for the following components:       Result Value    BUN 27 (*)     BUN/Creatinine Ratio 25.7 (*)     All other components within normal limits    Narrative:     GFR Normal >60  Chronic Kidney Disease <60  Kidney Failure <15     C-REACTIVE PROTEIN - Abnormal; Notable for the following components:    C-Reactive Protein 1.95 (*)     All other components within normal limits   CBC WITH AUTO DIFFERENTIAL - Abnormal; Notable for the following components:    RBC 3.98 (*)     Hemoglobin 12.9 (*)     Hematocrit 37.4 (*)     Lymphocyte % 18.8 (*)     All other components within normal limits   SEDIMENTATION RATE - Normal   URIC ACID - Normal   CBC AND DIFFERENTIAL    Narrative:     The following orders were created for panel order CBC & Differential.  Procedure                               Abnormality         Status                     ---------                               -----------          ------                     CBC Auto Differential[[id number]]        Abnormal            Final result                 Please view results for these tests on the individual orders.     XR Elbow 3+ View Left    Result Date: 4/12/2022  No acute findings.  Electronically Signed By-Yuriy Jiménez MD On:4/12/2022 5:09 PM This report was finalized on [id number] by  Yuriy Jiménez MD.    Medications   traMADol (ULTRAM) tablet 50 mg (50 mg Oral Given 4/12/22 1817)   methylPREDNISolone sodium succinate (SOLU-Medrol) injection 125 mg (125 mg Intramuscular Given 4/12/22 1957)                                                MDM  Number of Diagnoses or Management Options  Bursitis of left elbow, unspecified bursa  Left elbow pain  Diagnosis management comments: Comorbidities: Cardiomyopathy with recent AICD placement, hypertension  Differentials: Bursitis, cellulitis, arthritis, gout;this list is not all inclusive and does not constitute the entirety of considered causes  Discussion with provider:  Radiology interpretation: X-rays reviewed by me and interpreted by radiologist: As above  Lab interpretation: Labs viewed by me significant for: As above    Patient had labs and x-rays obtained.  He is given Norco for pain.    Work-up: CBC and metabolic panel are unremarkable.  Normal sed rate.  CRP mildly elevated at 1.95.  Uric acid within normal limits.  X-ray shows no acute abnormality.    On reexamination patient is resting comfortably in no distress.  Remains neurovascular intact.  He is afebrile.  Has a grossly benign work-up today.  He does have some tenderness questionable erythema and scant edema on exam.  Likely has recurrence of his bursitis.  Will be treated with steroids and antibiotics.  Otherwise is well-appearing and in no distress.    Diagnosis and treatment plan discussed with patient.  Patient agreeable to plan.   I discussed findings with patient who voices understanding of discharge instructions, signs and symptoms  requiring return to ED; discharged improved and in stable condition with follow up for re-evaluation.  Prescription for Medrol Dosepak and Keflex.         Amount and/or Complexity of Data Reviewed  Clinical lab tests: reviewed and ordered  Tests in the radiology section of CPT®: reviewed and ordered    Patient Progress  Patient progress: stable      Final diagnoses:   Bursitis of left elbow, unspecified bursa   Left elbow pain       ED Disposition  ED Disposition     ED Disposition   Discharge    Condition   Stable    Comment   --             Robe Brock II, MD  [address] [place] [address] [phone]      As needed         Medication List      New Prescriptions    cephalexin 500 MG capsule  Commonly known as: KEFLEX  Take 1 capsule by mouth 3 (Three) Times a Day for 7 days.     methylPREDNISolone 4 MG dose pack  Commonly known as: MEDROL  Take as directed on package instructions.           Where to Get Your Medications      These medications were sent to [facility] [place] - [address] - [phone]  - [phone] FX  [address] [place] [address]    Phone: [phone]   · cephalexin 500 MG capsule  · methylPREDNISolone 4 MG dose pack          Alma Ibanez, AUGUSTO  04/12/22 2024

## 2022-04-26 ENCOUNTER — TELEPHONE (OUTPATIENT)
Dept: FAMILY MEDICINE CLINIC | Facility: CLINIC | Age: 62
End: 2022-04-26

## 2022-04-26 ENCOUNTER — OFFICE VISIT (OUTPATIENT)
Dept: FAMILY MEDICINE CLINIC | Facility: CLINIC | Age: 62
End: 2022-04-26

## 2022-04-26 VITALS
WEIGHT: 196 LBS | BODY MASS INDEX: 29.03 KG/M2 | OXYGEN SATURATION: 97 % | TEMPERATURE: 97.5 F | HEART RATE: 71 BPM | DIASTOLIC BLOOD PRESSURE: 68 MMHG | HEIGHT: 69 IN | RESPIRATION RATE: 16 BRPM | SYSTOLIC BLOOD PRESSURE: 120 MMHG

## 2022-04-26 DIAGNOSIS — J30.2 SEASONAL ALLERGIC RHINITIS, UNSPECIFIED TRIGGER: Primary | ICD-10-CM

## 2022-04-26 PROCEDURE — 99213 OFFICE O/P EST LOW 20 MIN: CPT | Performed by: NURSE PRACTITIONER

## 2022-04-26 NOTE — PROGRESS NOTES
"Chief Complaint  Sinusitis and Eye Drainage  Subjective        Jens Jean-Baptiste presents to [facility]  Pt comes in today with c/o sneezing, runny nose with clear drainage, watery eyes. No fever or chills. No cough. No ST.   Symptoms started about 3-4 days ago.   No sick contacts.   Has been taking menthol cough drops and also tried emely seltzer plus yesterday.        Objective     Vital Signs:   /68   Pulse 71   Temp 97.5 °F (36.4 °C)   Resp 16   Ht 175.3 cm (69\")   Wt 88.9 kg (196 lb)   SpO2 97%   BMI 28.94 kg/m²       BP Readings from Last 3 Encounters:   04/26/22 120/68   04/12/22 125/72   11/18/21 100/64       Wt Readings from Last 3 Encounters:   04/26/22 88.9 kg (196 lb)   04/12/22 88.5 kg (195 lb)   11/12/21 98.8 kg (217 lb 13 oz)     Physical Exam  Constitutional:       Appearance: He is well-developed.   HENT:      Right Ear: Tympanic membrane normal.      Left Ear: Tympanic membrane normal.      Nose: Rhinorrhea present.   Eyes:      Pupils: Pupils are equal, round, and reactive to light.   Cardiovascular:      Rate and Rhythm: Normal rate and regular rhythm.   Pulmonary:      Effort: Pulmonary effort is normal.      Breath sounds: Normal breath sounds.   Neurological:      Mental Status: He is alert and oriented to person, place, and time.        Result Review :                 Assessment and Plan    Diagnoses and all orders for this visit:    1. Seasonal allergic rhinitis, unspecified trigger (Primary)    start otc zyrtec and flonase  During this office visit, we discussed the pertinent aspects of the visit and treatment recommendations. Pt verbalizes understanding. Follow up was discussed. Patient was given the opportunity to ask questions and discuss other concerns.         Follow Up   Return if symptoms worsen or fail to improve.  Patient was given instructions and counseling regarding his condition or for health maintenance advice. Please see specific " information pulled into the AVS if appropriate.

## 2022-04-26 NOTE — TELEPHONE ENCOUNTER
Caller: SEGUNDO CHARLES    Relationship: Emergency Contact    Best call back number: [phone]     What medication are you requesting:AZEB TERRAZAS     What are your current symptoms: SNEEZING, COLD CHILLS,   CONGESTION (NO FEVER)      How long have you been experiencing symptoms: 4/23/2022    Have you had these symptoms before:    [] Yes  [x] No    Have you been treated for these symptoms before:   [] Yes  [x] No    If a prescription is needed, what is your preferred pharmacy and phone number:  [facility] [place] - [address] [place] [address] - [phone]  - [phone]   [phone]

## 2022-06-03 ENCOUNTER — TELEPHONE (OUTPATIENT)
Dept: FAMILY MEDICINE CLINIC | Facility: CLINIC | Age: 62
End: 2022-06-03

## 2022-06-03 NOTE — TELEPHONE ENCOUNTER
Caller: Vianney Jean-Baptiste     Relationship to patient: Self     Best call back number: [phone]     Date of positive COVID19 test: 6/3/22     COVID19 symptoms: Cough, fever, congestion, achy-3 days     Additional information or concerns: has been taking zyrtec, tylenol  and emely seltzer before testing positive. Requesting a call with advice     What is the patients preferred [facility]   [facility] [place] [address] - [phone] [facility] [phone]   [phone]

## 2022-09-12 ENCOUNTER — TELEPHONE (OUTPATIENT)
Dept: CARDIOLOGY | Facility: CLINIC | Age: 62
End: 2022-09-12

## 2022-09-12 NOTE — TELEPHONE ENCOUNTER
Caller: SEGUNDO     Relationship:WIFE    Best call back number: [phone]    What is the best time to reach you: ANYTIME    Who are you requesting to speak with (clinical staff, provider,  specific staff member): CLINICAL       What was the call regarding: SEGUNDO WANTS TO KNOW IF THE PT CAN  SOME SAMPLES OF ENTRESTO. PLEASE GIVE A CALL BACK ASAP. THANKS !         PT HAS 1 WEEK LEFT OF SAMPLES CURRENTLY.   Do you require a callback: YES

## 2022-09-19 ENCOUNTER — TELEPHONE (OUTPATIENT)
Dept: FAMILY MEDICINE CLINIC | Facility: CLINIC | Age: 62
End: 2022-09-19

## 2022-09-19 NOTE — TELEPHONE ENCOUNTER
Caller: SEGUNDO CHARLES    Relationship: Emergency Contact    Best call back number: [phone]      What is the best time to reach you: ANYTIME     Who are you requesting to speak with (clinical staff, provider,  specific staff member): MA OR DOCTOR         What was the call regarding: PATIENT AND WIFE IS CALLING IN SHE STATES THAT PATIENT HAS BEEN BRUISING VERY EASILY AND THEY ARE CONCERNED ABOUT THIS, TRIED TO MAKE APPT WITH DOCTOR NOTHING AVAILABLE UNTIL END OF NOV AND THEY WILL NOT BE IN TOWN AND ARE WANTING TO SEE IF DOCTOR CAN GET HIM IN TO SEE HIM.    Do you require a callback: YES

## 2022-09-20 NOTE — TELEPHONE ENCOUNTER
Gave message to patient's wife at [facility] at 10:35am.  Scheduled an appt with PMJ on 11/16/2022 at 3pm.  Offered another provider but wife declined.

## 2022-10-10 ENCOUNTER — TELEPHONE (OUTPATIENT)
Dept: CARDIOLOGY | Facility: CLINIC | Age: 62
End: 2022-10-10

## 2022-10-10 NOTE — TELEPHONE ENCOUNTER
Caller: SEGUNDO     Relationship: WIFE    Best call back number: [phone]    What is the best time to reach you: ANY    Who are you requesting to speak with (clinical staff, provider,  specific staff member): CLINICAL      What was the call regarding: THEY ARE CALLING TO SEE IF THEY CAN GET MORE SAMPLES OF ENTRESTO 4951, THEY ARE IN THE [place] AND THE MEDICINE IS REALLY EXPENSIVE AND UNABLE TO AFFORD IT.       Do you require a callback: YES

## 2022-11-30 ENCOUNTER — TELEPHONE (OUTPATIENT)
Dept: CARDIOLOGY | Facility: CLINIC | Age: 62
End: 2022-11-30

## 2022-11-30 NOTE — TELEPHONE ENCOUNTER
Patients wife called and needing to get samples of Entresto 4951 and have the samples put up at the .     2 bottles

## 2022-12-29 ENCOUNTER — TELEPHONE (OUTPATIENT)
Dept: CARDIOLOGY | Facility: CLINIC | Age: 62
End: 2022-12-29

## 2022-12-29 NOTE — TELEPHONE ENCOUNTER
Caller: SEGUNDO CHARLES    Relationship: Emergency Contact    Best call back number: [phone]    What is the best time to reach you: ANY    Who are you requesting to speak with (clinical staff, provider,  specific staff member): ANY      What was the call regarding: PT NEEDS SAMPLES OF ENTRESTO 49-51 MG. PLEASE CALL BACK IF SAMPLES ARE AVAILABLE. PT HAS 2 DAYS LEFT    Do you require a callback: YES

## 2023-01-17 ENCOUNTER — TELEPHONE (OUTPATIENT)
Dept: CARDIOLOGY | Facility: CLINIC | Age: 63
End: 2023-01-17
Payer: MEDICARE

## 2023-01-17 NOTE — TELEPHONE ENCOUNTER
With the new policy of the samples, I cannot give anymore samples out to this patient, I will need to send in a script of entresto 49/51 for this patient. I will need your approval to send this in to his pharmacy.

## 2023-01-17 NOTE — TELEPHONE ENCOUNTER
Called patient and his wife Aysha answered and she is gong to see his schedule for his other appointments and will call back to schedule an appointment. They are also going to call back pertaining to his entresto because they are wanting to go and see if his PCP has samples at that office.

## 2023-01-17 NOTE — TELEPHONE ENCOUNTER
Yes you can send the Entresto to pharmacy. Please also let patient know he needs to be seen by Dr. [facility] as it has been over a year last visit 08/2021. Please have him make an appointment within the next few months. We cannot continue to prescribed medication without a visit. Thank you.

## 2023-01-20 NOTE — TELEPHONE ENCOUNTER
Patient is scheduled for an appointment in Feb. Sounds like per the hub the patient may still be under the impression that since an appointment has been made, we can give them samples. A follow up call to the patient may be needed to clarify.

## 2023-02-09 ENCOUNTER — OFFICE VISIT (OUTPATIENT)
Dept: CARDIOLOGY | Facility: CLINIC | Age: 63
End: 2023-02-09
Payer: MEDICARE

## 2023-02-09 VITALS
HEIGHT: 69 IN | SYSTOLIC BLOOD PRESSURE: 119 MMHG | WEIGHT: 204.5 LBS | HEART RATE: 61 BPM | BODY MASS INDEX: 30.29 KG/M2 | OXYGEN SATURATION: 98 % | DIASTOLIC BLOOD PRESSURE: 80 MMHG

## 2023-02-09 DIAGNOSIS — E78.00 PURE HYPERCHOLESTEROLEMIA: ICD-10-CM

## 2023-02-09 DIAGNOSIS — I50.22 CHRONIC SYSTOLIC CONGESTIVE HEART FAILURE: Primary | ICD-10-CM

## 2023-02-09 DIAGNOSIS — I42.9 CARDIOMYOPATHY, UNSPECIFIED TYPE: ICD-10-CM

## 2023-02-09 DIAGNOSIS — I10 ESSENTIAL HYPERTENSION: ICD-10-CM

## 2023-02-09 PROCEDURE — 99214 OFFICE O/P EST MOD 30 MIN: CPT | Performed by: INTERNAL MEDICINE

## 2023-02-09 NOTE — PROGRESS NOTES
"    Subjective:     Encounter Date:02/09/2023      Patient ID: Jens Jean-Baptiste is a 62 y.o. male.    Chief Complaint:  History of Present Illness 62-year-old white male with history of congenital heart disease with a corrected transposition of great vessels congestive heart with chronic LV systolic dysfunction and status post ICD placement hypertension hyperlipidemia presents to my office for follow-up.  Patient is currently stable without any symptoms of chest pain or shortness of breath at rest on exertion.  No complains any PND orthopnea.  No palpitation dizziness syncope or swelling of the feet.  He is taking meds regular.  He is also followed by the heart failure clinic in [place]    The following portions of the patient's history were reviewed and updated as appropriate: allergies, current medications, past family history, past medical history, past social history, past surgical history and problem list.  Past Medical History:   Diagnosis Date   • Cardiomyopathy (HCC)    • CHF (congestive heart failure) (HCC)    • Hyperlipidemia    • Hypertension      Past Surgical History:   Procedure Laterality Date   • CARDIAC CATHETERIZATION  2017   • CARDIAC DEFIBRILLATOR PLACEMENT N/A    • COLONOSCOPY N/A 10/28/2021    Procedure: COLONOSCOPY;  Surgeon: AURELIA Bird MD;  Location: [facility] ENDOSCOPY;  Service: Gastroenterology;  Laterality: N/A;  diverticulosis     • ENDOSCOPY N/A 10/28/2021    Procedure: ESOPHAGOGASTRODUODENOSCOPY with biopsy x2 areas and dilitation (#54 bougie);  Surgeon: AURELIA Bird MD;  Location: [facility] ENDOSCOPY;  Service: Gastroenterology;  Laterality: N/A;  erosive esophagitis;erosive gastritis esophageal  stricture   • UMBILICAL HERNIA REPAIR       /80   Pulse 61   Ht 175.3 cm (69\")   Wt 92.8 kg (204 lb 8 oz)   SpO2 98%   BMI 30.20 kg/m²   Family History   Problem Relation Age of Onset   • Dementia Mother    • Cancer Father        Current Outpatient Medications:   •  " aspirin (aspirin) 81 MG EC tablet, Take 81 mg by mouth Daily., Disp: , Rfl:   •  carvedilol (COREG) 6.25 MG tablet, Take 6.25 mg by mouth Daily., Disp: , Rfl:   •  ENTRESTO 49-51 MG tablet, Take 1 tablet by mouth 2 (Two) Times a Day., Disp: , Rfl:   •  spironolactone (ALDACTONE) 25 MG tablet, , Disp: , Rfl:   Allergies   Allergen Reactions   • Hydrocodone Urinary Retention     Social History     Socioeconomic History   • Marital status:    Tobacco Use   • Smoking status: Never   • Smokeless tobacco: Never   Vaping Use   • Vaping Use: Never used   Substance and Sexual Activity   • Alcohol use: Never   • Drug use: Never   • Sexual activity: Defer     Review of Systems   Constitutional: Negative for malaise/fatigue.   Cardiovascular: Negative for chest pain, dyspnea on exertion, leg swelling and palpitations.   Respiratory: Negative for cough and shortness of breath.    Gastrointestinal: Negative for abdominal pain, nausea and vomiting.   Neurological: Negative for dizziness, focal weakness, headaches, light-headedness and numbness.   All other systems reviewed and are negative.             Objective:     Constitutional:       Appearance: Well-developed.   Eyes:      General: No scleral icterus.     Conjunctiva/sclera: Conjunctivae normal.   HENT:      Head: Normocephalic and atraumatic.   Neck:      Vascular: No carotid bruit or JVD.   Pulmonary:      Effort: Pulmonary effort is normal.      Breath sounds: Normal breath sounds. No wheezing. No rales.   Cardiovascular:      Normal rate. Regular rhythm.   Pulses:     Intact distal pulses.   Abdominal:      General: Bowel sounds are normal.      Palpations: Abdomen is soft.   Musculoskeletal:      Cervical back: Normal range of motion and neck supple. Skin:     General: Skin is warm and dry.      Findings: No rash.   Neurological:      Mental Status: Alert.       Procedures    Lab Review:         MDM  1.  Congestive heart failure  Patient has history of congestive  heart failure with LV systolic dysfunction and is currently status post ICD placement and is on aspirin carvedilol and Entresto  2.  Hypertension  Patient blood pressure currently stable on carvedilol and Entresto  3.  Hyperlipidemia  Patient is not on any statins but is on over-the-counter medicines  4.  Congenital heart disease  Patient is status post correction of transposition of great arteries.      Patient's previous medical records, labs, and EKG were reviewed and discussed with the patient at today's visit.

## 2023-05-03 ENCOUNTER — HOSPITAL ENCOUNTER (EMERGENCY)
Facility: HOSPITAL | Age: 63
Discharge: HOME | End: 2023-05-03
Attending: EMERGENCY MEDICINE
Payer: MEDICARE

## 2023-05-03 VITALS
BODY MASS INDEX: 30.24 KG/M2 | OXYGEN SATURATION: 99 % | HEIGHT: 69 IN | SYSTOLIC BLOOD PRESSURE: 123 MMHG | TEMPERATURE: 97.4 F | HEART RATE: 59 BPM | WEIGHT: 204.15 LBS | RESPIRATION RATE: 16 BRPM | DIASTOLIC BLOOD PRESSURE: 73 MMHG
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
## 2023-05-03 PROCEDURE — 36415 COLL VENOUS BLD VENIPUNCTURE: CPT

## 2023-05-03 PROCEDURE — 99283 EMERGENCY DEPT VISIT LOW MDM: CPT

## 2023-05-03 PROCEDURE — 86757 RICKETTSIA ANTIBODY: CPT | Performed by: PHYSICIAN ASSISTANT

## 2023-05-03 PROCEDURE — 86618 LYME DISEASE ANTIBODY: CPT | Performed by: PHYSICIAN ASSISTANT

## 2023-05-03 PROCEDURE — 87484 EHRLICHA CHAFFEENSIS AMP PRB: CPT | Performed by: PHYSICIAN ASSISTANT

## 2023-05-03 PROCEDURE — 87798 DETECT AGENT NOS DNA AMP: CPT | Performed by: PHYSICIAN ASSISTANT

## 2023-05-03 PROCEDURE — 87468 ANAPLSMA PHGCYTOPHLM AMP PRB: CPT | Performed by: PHYSICIAN ASSISTANT

## 2023-05-03 RX ORDER — DOXYCYCLINE 100 MG/1
200 TABLET ORAL ONCE
Status: COMPLETED | OUTPATIENT
Start: 2023-05-03 | End: 2023-05-03

## 2023-05-03 RX ADMIN — DOXYCYCLINE 200 MG: 100 TABLET ORAL at 15:49

## 2023-05-03 NOTE — DISCHARGE INSTRUCTIONS
Take Tylenol as needed for pain    Watch for signs of infection or worsening rash    You will be notified of abnormal results of your tick panel.  Also recommend following up with primary care for recheck    Return to the ER for new or worsening symptoms

## 2023-05-03 NOTE — ED PROVIDER NOTES
Subjective   History of Present Illness  Chief Complaint: Tick bite    Patient is a 62-year-old  male presents the ER with complaints of tick removal.  Patient states he thinks has been there for 2 days.  He denies any pain.  He does report some itching around the area.  No rash.  He denies any chest pain shortness breath headache lightheadedness or headaches.  No body aches.  No nausea vomiting.    PCP: Nathan Stout    History provided by:  Patient      Review of Systems   Constitutional: Negative for chills and fever.   HENT: Negative for sore throat and trouble swallowing.    Eyes: Negative.    Respiratory: Negative for shortness of breath and wheezing.    Cardiovascular: Negative for chest pain.   Gastrointestinal: Negative for abdominal pain, diarrhea, nausea and vomiting.   Endocrine: Negative.    Genitourinary: Negative for dysuria.   Musculoskeletal: Negative for myalgias.   Skin: Negative for rash.        Tick embedded left flank   Allergic/Immunologic: Negative.    Neurological: Negative for headaches.   Psychiatric/Behavioral: Negative for behavioral problems.   All other systems reviewed and are negative.      Past Medical History:   Diagnosis Date   • Cardiomyopathy    • CHF (congestive heart failure)    • Hyperlipidemia    • Hypertension        Allergies   Allergen Reactions   • Hydrocodone Urinary Retention       Past Surgical History:   Procedure Laterality Date   • CARDIAC CATHETERIZATION  2017   • CARDIAC DEFIBRILLATOR PLACEMENT N/A    • COLONOSCOPY N/A 10/28/2021    Procedure: COLONOSCOPY;  Surgeon: AURELIA Bird MD;  Location: [facility] ENDOSCOPY;  Service: Gastroenterology;  Laterality: N/A;  diverticulosis     • ENDOSCOPY N/A 10/28/2021    Procedure: ESOPHAGOGASTRODUODENOSCOPY with biopsy x2 areas and dilitation (#54 bougie);  Surgeon: AURELIA Bird MD;  Location: [facility] ENDOSCOPY;  Service: Gastroenterology;  Laterality: N/A;  erosive esophagitis;erosive gastritis esophageal   stricture   • UMBILICAL HERNIA REPAIR         Family History   Problem Relation Age of Onset   • Dementia Mother    • Cancer Father        Social History     Socioeconomic History   • Marital status:    Tobacco Use   • Smoking status: Never   • Smokeless tobacco: Never   Vaping Use   • Vaping Use: Never used   Substance and Sexual Activity   • Alcohol use: Never   • Drug use: Never   • Sexual activity: Defer           Objective   Physical Exam  Vitals and nursing note reviewed.   Constitutional:       General: He is not in acute distress.     Appearance: Normal appearance. He is obese. He is not diaphoretic.   HENT:      Head: Normocephalic and atraumatic.      Nose: Nose normal.   Cardiovascular:      Rate and Rhythm: Normal rate and regular rhythm.      Pulses: Normal pulses.      Heart sounds: Normal heart sounds.   Pulmonary:      Effort: Pulmonary effort is normal.      Breath sounds: Normal breath sounds.   Abdominal:      General: Abdomen is flat.      Tenderness: There is no abdominal tenderness.   Skin:     General: Skin is warm.      Capillary Refill: Capillary refill takes less than 2 seconds.      Findings: Erythema present.      Comments: Tick embedded left flank, not engorged.  Small surrounding area of erythema no drainage or bleeding  No diffuse rash no satellite lesions, no erythema multiforme     Neurological:      General: No focal deficit present.      Mental Status: He is alert and oriented to person, place, and time.   Psychiatric:         Mood and Affect: Mood normal.         Behavior: Behavior normal.         Foreign Body Removal - Embedded    Date/Time: 5/3/2023 4:43 PM  Performed by: Aviva Guillen PA  Authorized by: Garrick Blair MD     Consent:     Consent obtained:  Verbal    Consent given by:  Patient    Risks discussed:  Infection  Universal protocol:     Patient identity confirmed:  Verbally with patient  Location:     Location:  Trunk    Trunk location:  L flank     "Depth:  Intradermal    Tendon involvement:  None  Anesthesia:     Anesthesia method:  None  Procedure type:     Procedure complexity:  Simple  Procedure details:     Foreign bodies recovered:  1    Description:  Tick, not engorged    Intact foreign body removal: yes    Post-procedure details:     Confirmation:  No additional foreign bodies on visualization    Skin closure:  None    Procedure completion:  Tolerated               ED Course  ED Course as of 05/03/23 1653   Wed May 03, 2023   1550 Spoke with ONESIMO Richter pharmacist regarding appropriate Tick panel to order [MC]      ED Course User Index  [MC] Aviva Guillen PA    /72 (BP Location: Left arm, Patient Position: Sitting)   Pulse 82   Temp 97.4 °F (36.3 °C) (Temporal)   Resp 15   Ht 175.3 cm (69\")   Wt 92.6 kg (204 lb 2.3 oz)   SpO2 98%   BMI 30.15 kg/m²   Labs Reviewed   EHRLICHIA PROFILE, DNA PCR   RICKETTSIA SPECIES DNA, REAL TIME PCR   LYME ANTIBODY TOTAL WITH REFLEX (EVIN)   [facility] (IGG/M)     Medications   doxycycline (ADOXA) tablet 200 mg (200 mg Oral Given 5/3/23 1549)     No radiology results for the last day                                         Medical Decision Making  Differential Dx (Includes but not limited to): Lyme disease, [place] spotted fever, cellulitis embedded tick  Medical Records Reviewed: No pertinent records to review  Labs: Lab work ordered, still pending  Imaging: N/A  Telemetry: N/A  Testing considered but not ordered: Chest x-ray, patient denies any URI symptoms  Nature of Complaint: Acute  Admission vs Discharge: Discharge  Discussion: While in the ED labs were obtained appropriate PPE was worn during exam and throughout all encounters with the patient.  Patient had the above evaluation.  Patient presented to the ER for tick removal, reports no systemic symptoms.  No rash.  No fevers.  Patient given 200 mg doxycycline.  Tick panel lab work was obtained.  Full tick was removed from patient's flank.  " Area was cleansed thoroughly.  Patient to follow-up with primary care.  Will defer further antibiotics until tick panel has resulted.  Plan of care and discharge was discussed with ER attending, Dr. Do who agreed.    Discharge plan and instructions were discussed with the patient who verbalized understanding and is in agreement with the plan, all questions were answered at this time.  Patient is aware of signs symptoms that would require immediate return to the emergency room.  Patient understands importance of following up with primary care provider for further evaluation and worsening concerns as well as blood pressure recheck in the next 4 weeks.    Patient was discharged in improved stable condition with an upright steady gait.    Patient is aware that discharge does not mean that nothing is wrong but indicates no emergencies present and they must continue care with follow-up as given below or physician of his choice.    Tick bite with subsequent removal of tick: acute illness or injury  Amount and/or Complexity of Data Reviewed  Labs: ordered.      Risk  Prescription drug management.          Final diagnoses:   Tick bite with subsequent removal of tick       ED Disposition  ED Disposition     ED Disposition   Discharge    Condition   Stable    Comment   --             Nathan Stout MD  [address] [facility] [address]   [facility] [address]  [place] [id number] [phone]    Schedule an appointment as soon as possible for a visit in 2 days  As needed, If symptoms worsen         Medication List      No changes were made to your prescriptions during this visit.          Aviva Guillen PA  05/03/23 4415

## 2023-05-05 LAB
B BURGDOR IGG SER QL: NEGATIVE
R RICKETTSI IGG SER QL IA: POSITIVE
R RICKETTSI IGG TITR SER IF: NORMAL {TITER}
R RICKETTSI IGM SER-ACNC: 0.4 INDEX (ref 0–0.89)

## 2023-05-05 RX ORDER — DOXYCYCLINE HYCLATE 100 MG/1
100 CAPSULE ORAL 2 TIMES DAILY
Qty: 10 CAPSULE | Refills: 0 | Status: SHIPPED | OUTPATIENT
Start: 2023-05-05 | End: 2023-05-10

## 2023-05-05 NOTE — PROGRESS NOTES
Patient seen in ED 5/3/2022 for tick removal. Tick panel collected during visit. RMSF IgG resulted as positive, although IFA result is negative (<1:64). RMSF IgM is negative. Discussed with Dr. Do. Called and spoke with patient who denies fever, body aches, flu-like symptoms. Patient states the bite area looks red, slightly swollen, and is very itchy. Based on unclear results and dermatologic symptoms, Dr. Do agreed to start doxycycline 100 mg PO BID x5 days. Patient agreed with treatment plan. Rx sent to [facility] in [place] per patient request.    Lyme IgG is negative. Will continue to follow until results are finalized for Rickettsia and Ehrlichia.    Contains abnormal data [facility] (IgG / M)  Order: [id number]  Status: Final result     Visible to patient: No (scheduled for 5/5/2023  5:13 PM)     Specimen Information: Blood   0 Result Notes       Component  Ref Range & Units 2 d ago  (5/3/23) 2 d ago  (5/3/23)   RMSF IgG  Negative Positive Abnormal   <1:64 R, CM    RMSF IgM  0.00 - 0.89 index 0.40     Comment:                                  Negative        <0.90                                    Equivocal 0.90 - 1.10                                    Positive        >1.10   Resulting Agency [facility]           Narrative  Performed by: [facility]  Performed at:   - [address]  [id number]   : Osmin Figueroa MD, Phone:  [phone]      Specimen Collected: 05/03/23 16:21 EDT Last Resulted: 05/05/23 16:11 EDT        Lab Flowsheet     Order Details     View Encounter     Lab and Collection Details     Routing     Result History     View Encounter Conversation        CM=Additional comments  R=Reference range differs from displayed range        Result Care Coordination      Patient Communication     5/5/2023  5:13 PM   Not seen Back to Top             London Merino PharmD  05/05/23 17:18 EDT

## 2023-05-08 LAB
A PHAGOCYTOPH DNA BLD QL NAA+PROBE: NEGATIVE
E CHAFFEENSIS DNA BLD QL NAA+PROBE: NEGATIVE

## 2023-05-10 LAB — RICKETTSIA RICKETTSII DNA, RT: NOT DETECTED

## 2023-12-18 ENCOUNTER — TELEPHONE (OUTPATIENT)
Dept: FAMILY MEDICINE CLINIC | Facility: CLINIC | Age: 63
End: 2023-12-18
Payer: MEDICARE

## 2023-12-18 NOTE — TELEPHONE ENCOUNTER
Caller: SEGUNDO CHARLES    Relationship: Emergency Contact    Best call back number: [phone]     What medication are you requesting: MEDICATION TO TREAT SYMPTOMS    What are your current symptoms: COUGH, EYES HURTING, HEADACHE, PHLEGM     How long have you been experiencing symptoms: 2 WEEKS    Have you had these symptoms before:    [x] Yes  [] No    Have you been treated for these symptoms before:   [x] Yes  [] No    If a prescription is needed, what is your preferred pharmacy and phone number: [facility] [id number] - [place], [place] - [address] [place] [address] - [phone] [facility] [phone]      Additional notes: PATIENTS WIFE STATED OVER THE COUNTER COLD AND TYLENOL PRODUCTS HAVE NOT HELPED.

## 2023-12-19 ENCOUNTER — CLINICAL SUPPORT (OUTPATIENT)
Dept: FAMILY MEDICINE CLINIC | Facility: CLINIC | Age: 63
End: 2023-12-19
Payer: MEDICARE

## 2023-12-19 DIAGNOSIS — R05.9 COUGH, UNSPECIFIED TYPE: Primary | ICD-10-CM

## 2023-12-19 LAB
EXPIRATION DATE: NORMAL
FLUAV AG UPPER RESP QL IA.RAPID: NOT DETECTED
FLUBV AG UPPER RESP QL IA.RAPID: NOT DETECTED
INTERNAL CONTROL: NORMAL
Lab: NORMAL
SARS-COV-2 AG UPPER RESP QL IA.RAPID: NOT DETECTED

## 2023-12-19 PROCEDURE — 87428 SARSCOV & INF VIR A&B AG IA: CPT | Performed by: FAMILY MEDICINE

## 2023-12-19 NOTE — TELEPHONE ENCOUNTER
Jens needs to come to the office to be checked for flu and or COVID.  He can be tested as a point-of-care patient and then we can decide if any specific treatment is needed.

## 2023-12-19 NOTE — PROGRESS NOTES
Spoke with Jens, he does not need a stronger cough med at this time. He expressed understanding with result and instructions.

## 2023-12-19 NOTE — TELEPHONE ENCOUNTER
Gave message to patient's wife at 9:10am and put on the MISC nurse schedule for today at 10am.    Vanesa - can you put orders in?

## 2023-12-19 NOTE — PROGRESS NOTES
Tell Jens that he does not have COVID or flu.  He has another virus of some sort that is making him feel bad.  He can take ibuprofen and over-the-counter cough syrups.  I could call in a stronger cough medicine if that is his main complaint.

## 2023-12-22 ENCOUNTER — TELEPHONE (OUTPATIENT)
Dept: FAMILY MEDICINE CLINIC | Facility: CLINIC | Age: 63
End: 2023-12-22

## 2023-12-22 NOTE — TELEPHONE ENCOUNTER
Caller: SEGUNDO CHARLES    Relationship: Emergency Contact    Best call back number: [phone]    What medication are you requesting: TO RELIEVE SYMPTOMS, DOES NOT NEED COUGH MEDICATION     What are your current symptoms: SORE THROAT, CONGESTION      If a prescription is needed, what is your preferred pharmacy and phone number: [facility] [id number] - [place], [place] - [address] - [phone] [facility] [phone] FX     Additional notes:

## 2023-12-23 RX ORDER — AZITHROMYCIN 500 MG/1
500 TABLET, FILM COATED ORAL DAILY
Qty: 5 TABLET | Refills: 0 | Status: SHIPPED | OUTPATIENT
Start: 2023-12-23 | End: 2023-12-28

## 2023-12-27 ENCOUNTER — TELEPHONE (OUTPATIENT)
Dept: FAMILY MEDICINE CLINIC | Facility: CLINIC | Age: 63
End: 2023-12-27
Payer: MEDICARE

## 2023-12-27 NOTE — TELEPHONE ENCOUNTER
Hub staff attempted to follow warm transfer process and was unsuccessful     Caller: SEGUNDO CHARLES    Relationship to patient: Emergency Contact    Best call back number: [phone]     Patient is needing: PATIENT'S WIFE STATES HE IS NOT ANY BETTER. SHE WOULD LIKE TO GET HIM IN TO BE SEEN.

## 2023-12-28 ENCOUNTER — OFFICE VISIT (OUTPATIENT)
Dept: FAMILY MEDICINE CLINIC | Facility: CLINIC | Age: 63
End: 2023-12-28
Payer: MEDICARE

## 2023-12-28 VITALS
HEIGHT: 69 IN | BODY MASS INDEX: 30.72 KG/M2 | HEART RATE: 79 BPM | OXYGEN SATURATION: 97 % | DIASTOLIC BLOOD PRESSURE: 72 MMHG | RESPIRATION RATE: 16 BRPM | SYSTOLIC BLOOD PRESSURE: 116 MMHG | WEIGHT: 207.4 LBS

## 2023-12-28 DIAGNOSIS — J20.9 BRONCHITIS WITH BRONCHOSPASM: Primary | ICD-10-CM

## 2023-12-28 PROCEDURE — 3078F DIAST BP <80 MM HG: CPT | Performed by: FAMILY MEDICINE

## 2023-12-28 PROCEDURE — 99213 OFFICE O/P EST LOW 20 MIN: CPT | Performed by: FAMILY MEDICINE

## 2023-12-28 PROCEDURE — 1159F MED LIST DOCD IN RCRD: CPT | Performed by: FAMILY MEDICINE

## 2023-12-28 PROCEDURE — 1160F RVW MEDS BY RX/DR IN RCRD: CPT | Performed by: FAMILY MEDICINE

## 2023-12-28 PROCEDURE — 3074F SYST BP LT 130 MM HG: CPT | Performed by: FAMILY MEDICINE

## 2023-12-28 RX ORDER — CEFDINIR 300 MG/1
300 CAPSULE ORAL 2 TIMES DAILY
Qty: 20 CAPSULE | Refills: 0 | Status: SHIPPED | OUTPATIENT
Start: 2023-12-28 | End: 2024-01-07

## 2023-12-28 NOTE — PROGRESS NOTES
"Chief Complaint  Sore Throat and URI (Chest Congestion / 1 month)    Subjective        Jens Jean-Baptiste presents to [facility]  History of Present Illness    The patient presents today for a follow-up. An adult female accompanies him.    The patient has been experiencing a cold for approximately 1 month. He has a head cold, rhinorrhea, and pharyngitis. It seems like the illness has settled into his chest. He notes that the congestion started approximately 1 month ago. He was taking Alesia-[place] Plus Cold and Flu. The patient's symptoms were resolving; however, his upper respiratory symptoms have returned suddenly. He started taking the medication that was prescribed for him. He still has congestion, but it seems like it is in his chest. He coughs, his bilateral ears are painful. The patient completed a course of antibiotics, Zithromax, prescribed. He has been on the Stahist AD for 4.5 days. The patient has not been prescribed a steroid.  He denies any night sweats, fever, or chills. He is not coughing up much phlegm. The patient denies any postnasal drip. He denies any wheezing at night. He does not cough more than usual. He has had a \"tickle\" in his throat for a long time. The patient would try to cough to get rid of the sensation.  It lasted 3 weeks, but it seemed like it is gone now.  The patient recently tested negative for the COVID-19 virus.  He confirms radha the COVID-19 virus in the past.  The patient has received 2 COVID-19 virus immunizations.  He denies receiving any recent booster.    The patient underwent a right-side heart catheterization a couple of weeks ago. He was told that everything looked good.       Objective   Vital Signs:  /72   Pulse 79   Resp 16   Ht 175.3 cm (69\")   Wt 94.1 kg (207 lb 6.4 oz)   SpO2 97%   BMI 30.63 kg/m²   Estimated body mass index is 30.63 kg/m² as calculated from the following:    Height as of this encounter: 175.3 cm " "(69\").    Weight as of this encounter: 94.1 kg (207 lb 6.4 oz).             Physical Exam  Constitutional:       Appearance: Normal appearance. He is well-developed and normal weight.   HENT:      Head: Normocephalic and atraumatic.      Right Ear: Tympanic membrane, ear canal and external ear normal.      Left Ear: Tympanic membrane, ear canal and external ear normal.      Nose: Nose normal.      Mouth/Throat:      Mouth: Mucous membranes are moist.      Pharynx: Oropharynx is clear. No oropharyngeal exudate.   Eyes:      Extraocular Movements: Extraocular movements intact.      Conjunctiva/sclera: Conjunctivae normal.      Pupils: Pupils are equal, round, and reactive to light.   Cardiovascular:      Rate and Rhythm: Normal rate and regular rhythm.      Pulses: Normal pulses.      Heart sounds: Normal heart sounds.   Pulmonary:      Effort: Pulmonary effort is normal.      Breath sounds: Normal breath sounds.   Abdominal:      General: Bowel sounds are normal.      Palpations: Abdomen is soft.   Musculoskeletal:         General: Normal range of motion.      Cervical back: Normal range of motion and neck supple.   Skin:     General: Skin is warm and dry.   Neurological:      General: No focal deficit present.      Mental Status: He is alert and oriented to person, place, and time. Mental status is at baseline.   Psychiatric:         Mood and Affect: Mood normal.         Behavior: Behavior normal.         Thought Content: Thought content normal.         Judgment: Judgment normal.        Result Review :                 Assessment and Plan       1. Bronchitis with bronchospasm  - Mr. 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 He has had heart surgery with placement of an autonomic defibrillator and pacemaker. He developed sinus pressure and pain about a month ago and it spread quickly into his other sinuses of the " face and head, and he felt generally horrible. After dealing with it for about a week, he called got some over-the-counter medications and then started to get better and felt like for the most part it had cleared when suddenly everything got worse again and this time, we called in some antibiotic for him and some Stahist AD. He feels like that combination of medications, which was Zithromax 500 mg a day for 5 days along with Stahist AD 2 times a day. He has about finished the Zithromax and he feels like the infection has moved down into his chest and his airways. He has almost completed the course of Zithromax and is still taking the Stahist. He feels a little better overall, but still feels like there is some infection down in his lungs. I think what we are going to do is have him finish his last Zithromax tonight and then start cefdinir 300 mg twice a day. I am going to give him Breztri 2 inhalations twice a day for what I think is bronchitis in his airways along with an element of reactive airway disease bronchospasm. I think most of this occurs at night. He is going to call in a week or two and let me know how he is doing. Hopefully, the bronchitis will resolve with the addition of cefdinir, and the reactive airway component will respond to the Breztri. He could stay on the Stahist AD for congestion in the sinuses if he needs it. He is to drink a lot of fluid, get some rest, and if he needs anything for coughing, he could get some Delsym over the counter and take 2 teaspoons every 12 hours.         Follow Up   Return in about 2 weeks (around 1/11/2024), or if symptoms worsen or fail to improve, for Recheck.  Patient was given instructions and counseling regarding his condition or for health maintenance advice. Please see specific information pulled into the AVS if appropriate.     Transcribed from ambient dictation for Nathan Stout MD by Shayna Ortega.  12/28/23   17:23 EST    Patient or patient  representative verbalized consent to the visit recording.  I have personally performed the services described in this document as transcribed by the above individual, and it is both accurate and complete.  Nathan Stout MD  1/1/2024  08:34 EST

## 2024-01-18 ENCOUNTER — APPOINTMENT (OUTPATIENT)
Dept: GENERAL RADIOLOGY | Facility: HOSPITAL | Age: 64
End: 2024-01-18
Payer: MEDICARE

## 2024-01-18 ENCOUNTER — HOSPITAL ENCOUNTER (EMERGENCY)
Facility: HOSPITAL | Age: 64
Discharge: HOME | End: 2024-01-18
Attending: EMERGENCY MEDICINE | Admitting: EMERGENCY MEDICINE
Payer: MEDICARE

## 2024-01-18 VITALS
TEMPERATURE: 97.7 F | WEIGHT: 205 LBS | RESPIRATION RATE: 18 BRPM | HEART RATE: 62 BPM | SYSTOLIC BLOOD PRESSURE: 139 MMHG | OXYGEN SATURATION: 99 % | BODY MASS INDEX: 30.36 KG/M2 | DIASTOLIC BLOOD PRESSURE: 79 MMHG | HEIGHT: 69 IN

## 2024-01-18 DIAGNOSIS — M25.562 ACUTE PAIN OF LEFT KNEE: Primary | ICD-10-CM

## 2024-01-18 LAB
ANION GAP SERPL CALCULATED.3IONS-SCNC: 8 MMOL/L (ref 5–15)
BASOPHILS # BLD AUTO: 0 10*3/MM3 (ref 0–0.2)
BASOPHILS NFR BLD AUTO: 0.4 % (ref 0–1.5)
BUN SERPL-MCNC: 21 MG/DL (ref 8–23)
BUN/CREAT SERPL: 18.4 (ref 7–25)
CALCIUM SPEC-SCNC: 9.3 MG/DL (ref 8.6–10.5)
CHLORIDE SERPL-SCNC: 104 MMOL/L (ref 98–107)
CO2 SERPL-SCNC: 23 MMOL/L (ref 22–29)
CREAT SERPL-MCNC: 1.14 MG/DL (ref 0.76–1.27)
CRP SERPL-MCNC: 0.3 MG/DL (ref 0–0.5)
DEPRECATED RDW RBC AUTO: 42.4 FL (ref 37–54)
EGFRCR SERPLBLD CKD-EPI 2021: 72.3 ML/MIN/1.73
EOSINOPHIL # BLD AUTO: 0 10*3/MM3 (ref 0–0.4)
EOSINOPHIL NFR BLD AUTO: 0.3 % (ref 0.3–6.2)
ERYTHROCYTE [DISTWIDTH] IN BLOOD BY AUTOMATED COUNT: 13 % (ref 12.3–15.4)
ERYTHROCYTE [SEDIMENTATION RATE] IN BLOOD: 17 MM/HR (ref 0–20)
GLUCOSE SERPL-MCNC: 107 MG/DL (ref 65–99)
HCT VFR BLD AUTO: 37.6 % (ref 37.5–51)
HGB BLD-MCNC: 12.6 G/DL (ref 13–17.7)
LYMPHOCYTES # BLD AUTO: 1 10*3/MM3 (ref 0.7–3.1)
LYMPHOCYTES NFR BLD AUTO: 11.4 % (ref 19.6–45.3)
MCH RBC QN AUTO: 31.6 PG (ref 26.6–33)
MCHC RBC AUTO-ENTMCNC: 33.5 G/DL (ref 31.5–35.7)
MCV RBC AUTO: 94.2 FL (ref 79–97)
MONOCYTES # BLD AUTO: 0.4 10*3/MM3 (ref 0.1–0.9)
MONOCYTES NFR BLD AUTO: 4.3 % (ref 5–12)
NEUTROPHILS NFR BLD AUTO: 7.4 10*3/MM3 (ref 1.7–7)
NEUTROPHILS NFR BLD AUTO: 83.6 % (ref 42.7–76)
NRBC BLD AUTO-RTO: 0 /100 WBC (ref 0–0.2)
PLATELET # BLD AUTO: 182 10*3/MM3 (ref 140–450)
PMV BLD AUTO: 7.3 FL (ref 6–12)
POTASSIUM SERPL-SCNC: 4.2 MMOL/L (ref 3.5–5.2)
RBC # BLD AUTO: 3.99 10*6/MM3 (ref 4.14–5.8)
SODIUM SERPL-SCNC: 135 MMOL/L (ref 136–145)
URATE SERPL-MCNC: 7.1 MG/DL (ref 3.4–7)
WBC NRBC COR # BLD AUTO: 8.9 10*3/MM3 (ref 3.4–10.8)

## 2024-01-18 PROCEDURE — 85652 RBC SED RATE AUTOMATED: CPT | Performed by: NURSE PRACTITIONER

## 2024-01-18 PROCEDURE — 86140 C-REACTIVE PROTEIN: CPT | Performed by: NURSE PRACTITIONER

## 2024-01-18 PROCEDURE — 73564 X-RAY EXAM KNEE 4 OR MORE: CPT

## 2024-01-18 PROCEDURE — 97162 PT EVAL MOD COMPLEX 30 MIN: CPT

## 2024-01-18 PROCEDURE — 84550 ASSAY OF BLOOD/URIC ACID: CPT | Performed by: NURSE PRACTITIONER

## 2024-01-18 PROCEDURE — 99283 EMERGENCY DEPT VISIT LOW MDM: CPT

## 2024-01-18 PROCEDURE — 80048 BASIC METABOLIC PNL TOTAL CA: CPT | Performed by: NURSE PRACTITIONER

## 2024-01-18 PROCEDURE — 36415 COLL VENOUS BLD VENIPUNCTURE: CPT

## 2024-01-18 PROCEDURE — 85025 COMPLETE CBC W/AUTO DIFF WBC: CPT | Performed by: NURSE PRACTITIONER

## 2024-01-18 RX ORDER — HYDROCODONE BITARTRATE AND ACETAMINOPHEN 7.5; 325 MG/1; MG/1
1 TABLET ORAL ONCE
Status: COMPLETED | OUTPATIENT
Start: 2024-01-18 | End: 2024-01-18

## 2024-01-18 RX ORDER — HYDROCODONE BITARTRATE AND ACETAMINOPHEN 7.5; 325 MG/1; MG/1
1 TABLET ORAL EVERY 6 HOURS PRN
Qty: 10 TABLET | Refills: 0 | Status: SHIPPED | OUTPATIENT
Start: 2024-01-18

## 2024-01-18 RX ORDER — METHYLPREDNISOLONE 4 MG/1
TABLET ORAL
Qty: 21 TABLET | Refills: 0 | Status: SHIPPED | OUTPATIENT
Start: 2024-01-18

## 2024-01-18 RX ADMIN — HYDROCODONE BITARTRATE AND ACETAMINOPHEN 1 TABLET: 7.5; 325 TABLET ORAL at 15:21

## 2024-01-18 NOTE — ED PROVIDER NOTES
Subjective   History of Present Illness  Patient is a 63-year-old white male with history of hypertension and cholesterol CHF and cardiomyopathy.  He also has a history of arthritis.  He presents today for complaints of left knee pain.  He states that started yesterday with no known injury or trauma.  Pain is located anterior aspect of the knee just over the patella.  Is worse when he bends his knee.  He states he feels swollen and tight.  No redness or fever.  No chest pain or shortness of breath.  States he scheduled to see his orthopedic specialist but could not get in for 10 days.      Review of Systems   Constitutional:  Negative for fever.   Musculoskeletal:         Left knee pain   Neurological:  Negative for weakness and numbness.       Past Medical History:   Diagnosis Date    Cardiomyopathy     CHF (congestive heart failure)     Hyperlipidemia     Hypertension     Nausea vomiting and diarrhea 12/16/2019       Allergies   Allergen Reactions    Hydrocodone Urinary Retention       Past Surgical History:   Procedure Laterality Date    CARDIAC CATHETERIZATION  2017    CARDIAC DEFIBRILLATOR PLACEMENT N/A     COLONOSCOPY N/A 10/28/2021    Procedure: COLONOSCOPY;  Surgeon: AURELIA Bird MD;  Location: [facility] ENDOSCOPY;  Service: Gastroenterology;  Laterality: N/A;  diverticulosis      ENDOSCOPY N/A 10/28/2021    Procedure: ESOPHAGOGASTRODUODENOSCOPY with biopsy x2 areas and dilitation (#54 bougie);  Surgeon: AURELIA Bird MD;  Location: [facility] ENDOSCOPY;  Service: Gastroenterology;  Laterality: N/A;  erosive esophagitis;erosive gastritis esophageal  stricture    UMBILICAL HERNIA REPAIR         Family History   Problem Relation Age of Onset    Dementia Mother     Cancer Father        Social History     Socioeconomic History    Marital status:    Tobacco Use    Smoking status: Never    Smokeless tobacco: Never   Vaping Use    Vaping Use: Never used   Substance and Sexual Activity    Alcohol use: Never     Drug use: Never    Sexual activity: Defer           Objective   Physical Exam  Vital signs and triage nurse note reviewed.  Constitutional: Awake, alert; well-developed and well-nourished. No acute distress is noted.  Cardiovascular: Regular rate and rhythm  Pulmonary: Respiratory effort regular nonlabored  Musculoskeletal: Independent range of motion of all extremities.  Tenderness over the anterior aspect of the left knee over the superior aspect of the patella.  There is no overlying erythema or ecchymosis.  No crepitus.  No appreciable edema.  No open wounds.  There is good cap refill and sensation distally.  Neuro: Alert oriented x3, speech is clear and appropriate, GCS 15.    Skin: Flesh tone, warm, dry, intact; no erythematous or petechial rash or lesion.    Procedures           ED Course      Labs Reviewed   BASIC METABOLIC PANEL - Abnormal; Notable for the following components:       Result Value    Glucose 107 (*)     Sodium 135 (*)     All other components within normal limits    Narrative:     GFR Normal >60  Chronic Kidney Disease <60  Kidney Failure <15     URIC ACID - Abnormal; Notable for the following components:    Uric Acid 7.1 (*)     All other components within normal limits   CBC WITH AUTO DIFFERENTIAL - Abnormal; Notable for the following components:    RBC 3.99 (*)     Hemoglobin 12.6 (*)     Neutrophil % 83.6 (*)     Lymphocyte % 11.4 (*)     Monocyte % 4.3 (*)     Neutrophils, Absolute 7.40 (*)     All other components within normal limits   SEDIMENTATION RATE - Normal   C-REACTIVE PROTEIN - Normal   CBC AND DIFFERENTIAL    Narrative:     The following orders were created for panel order CBC & Differential.  Procedure                               Abnormality         Status                     ---------                               -----------         ------                     CBC Auto Differential[[id number]]        Abnormal            Final result                 Please view results  for these tests on the individual orders.     XR Knee 4+ View Left    Result Date: 1/18/2024  Impression: No radiographic evidence of acute fracture or dislocation. Mild degenerative changes. Electronically Signed: Asim Leung MD  1/18/2024 3:10 PM EST  Workstation ID: [id number]   Medications   HYDROcodone-acetaminophen (NORCO) 7.5-325 MG per tablet 1 tablet (1 tablet Oral Given 1/18/24 1521)                                              Medical Decision Making  Patient presents today with the above complaint.    He had the above exam and evaluation.  Labs and x-rays were obtained.  He was given a Norco for pain.    Workup: CBC and metabolic panel are unremarkable.  Sed rate and CRP are within normal limits.  Uric acid 7.1.  X-ray shows mild degenerative changes, no acute abnormality.    Patient was seen and evaluated by PT.    Diagnosis and treatment plan discussed with patient.  Patient agreeable to plan.   I discussed findings with patient who voices understanding of discharge instructions, signs and symptoms requiring return to ED; discharged improved and in stable condition with follow up for re-evaluation.  Prescription for Medrol Dosepak and Norco.  Inspect reviewed.    Problems Addressed:  Acute pain of left knee: complicated acute illness or injury    Amount and/or Complexity of Data Reviewed  Labs: ordered.  Radiology: ordered.    Risk  Prescription drug management.        Final diagnoses:   Acute pain of left knee       ED Disposition  ED Disposition       ED Disposition   Discharge    Condition   Stable    Comment   --               Your orthopedic specialist               Medication List        New Prescriptions      HYDROcodone-acetaminophen 7.5-325 MG per tablet  Commonly known as: NORCO  Take 1 tablet by mouth Every 6 (Six) Hours As Needed for Moderate Pain or Severe Pain.     methylPREDNISolone 4 MG dose pack  Commonly known as: MEDROL  Take as directed on package instructions.               Where  to Get Your Medications        These medications were sent to [facility] [id number] - [place], [place] - [address] - [phone]  - [phone] FX  [address] [place] [place] [address]      Phone: [phone]   HYDROcodone-acetaminophen 7.5-325 MG per tablet  methylPREDNISolone 4 MG dose pack            Alma Ibanez APRN  01/18/24 1707       Alma Ibanez APRN  01/18/24 1707

## 2024-01-18 NOTE — THERAPY EVALUATION
Patient Name: Jens Jean-Baptiste  : 1960    MRN: [id number]                              Today's Date: 2024       Admit Date: 2024    Visit Dx: No diagnosis found.  Patient Active Problem List   Diagnosis    Abnormal cardiovascular stress test    Cardiomyopathy    Congenital heart disease    Hyperlipidemia    Hypertension    Irritable bowel syndrome with diarrhea    Chronic low back pain    Complete rotator cuff tear or rupture of left shoulder, not specified as traumatic    Degeneration of intervertebral disc of lumbar region    Hemorrhoids    Transposition of great vessels    Vitreous degeneration, right    Nausea vomiting and diarrhea    Gastroenteritis due to norovirus    Chronic combined systolic and diastolic congestive heart failure, NYHA class 2    Carotidynia    Anomalous coronary artery origin    Arthritis    Congestive heart failure    Coronary-myocardial bridge    Osteoarthritis of left acromioclavicular joint    Osteoarthritis of left knee    Osteoarthritis of right knee    Rotator cuff syndrome    Degeneration of lumbar intervertebral disc    Transposition great arteries    COVID-19 virus infection    Bronchitis with bronchospasm     Past Medical History:   Diagnosis Date    Cardiomyopathy     CHF (congestive heart failure)     Hyperlipidemia     Hypertension     Nausea vomiting and diarrhea 2019     Past Surgical History:   Procedure Laterality Date    CARDIAC CATHETERIZATION      CARDIAC DEFIBRILLATOR PLACEMENT N/A     COLONOSCOPY N/A 10/28/2021    Procedure: COLONOSCOPY;  Surgeon: AURELIA Bird MD;  Location: [facility] ENDOSCOPY;  Service: Gastroenterology;  Laterality: N/A;  diverticulosis      ENDOSCOPY N/A 10/28/2021    Procedure: ESOPHAGOGASTRODUODENOSCOPY with biopsy x2 areas and dilitation (#54 bougie);  Surgeon: AURELIA Bird MD;  Location: [facility] ENDOSCOPY;  Service: Gastroenterology;  Laterality: N/A;  erosive esophagitis;erosive gastritis esophageal   stricture    UMBILICAL HERNIA REPAIR       History: Pt is a 64 y/o M admitted to [facility] ED on 1/18/24 with complaints of anterior L knee pain beginning 3 days ago and progressively worsening until his arrival to the ED today. Describes the pain as a dull, achy pain on the anterior L patella. Reports no change in activity and is retired. No mechanism of injury is reported. PMH includes olecranon bursitis on L elbow, significant R knee OA receiving injections on OP basis. Pain stays localized to the knee, 3/10 at rest, 9/10 with flexion of the knee. Has appointment with orthopedic specialist scheduled for 10 days from now.     Objective:    Palpation: Tender to palpation at L anterior knee on the pre-pallar bursa.      Sensation: Normal    Assessment/Plan:   Pt presents with a diagnosis of prepatellar bursitis on the L knee. He is very tender to touch on the prepatellar bursa, aggravated with flexion of the L knee and alleviated with extension of the knee. Pt was educated on the condition as well as the prognosis for recovery. He was asking about cortisone injections provided in the ED, which he was educated that the providers do not complete. APRN made aware of request. He will likely not require OPPT at d/c because once pain is controlled and inflammation decreases, normal AROM of the L knee will return. Pt educated the patient on icing and anti-inflammatory medications to help decreased inflammation, as well as follow-up with orthopedic specialist to determine if injections are needed. PT signing off.        Clinical Impression       Row Name 01/18/24 1614          Pain    Pretreatment Pain Rating 3/10  -MB     Posttreatment Pain Rating 9/10  -MB     Pain Location - Side/Orientation Left  -MB     Pain Location - knee  -MB     Pain Intervention(s) Repositioned;Nursing Notified;Emotional support;MD notified (Comment)  -MB       Row Name 01/18/24 8078          Plan of Care Review    Plan of Care Reviewed With patient   -MB     Progress no change  -MB       Row Name 01/18/24 1614          Therapy Assessment/Plan (PT)    Criteria for Skilled Interventions Met (PT) no  -MB     Therapy Frequency (PT) evaluation only  -MB     Predicted Duration of Therapy Intervention (PT) eval only  -MB       Row Name 01/18/24 1614          Vital Signs    O2 Delivery Pre Treatment room air  -MB     O2 Delivery Intra Treatment room air  -MB     O2 Delivery Post Treatment room air  -MB     Pre Patient Position Supine  -MB     Intra Patient Position Sitting  -MB     Post Patient Position Supine  -MB       Row Name 01/18/24 1614          Positioning and Restraints    Pre-Treatment Position in bed  -MB     Post Treatment Position bed  -MB     In Bed notified nsg;supine;call light within reach;encouraged to call for assist  -MB               User Key  (r) = Recorded By, (t) = Taken By, (c) = Cosigned By      Initials Name Provider Type    Andreas Alonso, PT Physical Therapist                  PT Recommendation and Plan     Plan of Care Reviewed With: patient  Progress: no change     Time Calculation:   PT Evaluation Complexity  History, PT Evaluation Complexity: 1-2 personal factors and/or comorbidities  Examination of Body Systems (PT Eval Complexity): total of 3 or more elements  Clinical Presentation (PT Evaluation Complexity): evolving  Clinical Decision Making (PT Evaluation Complexity): moderate complexity  Overall Complexity (PT Evaluation Complexity): moderate complexity     PT Charges       Row Name 01/18/24 1617 01/18/24 1615          Time Calculation    Start Time 1559  -MB 1533  -MB     Stop Time 1615  -MB 1558  -MB     Time Calculation (min) 16 min  -MB 25 min  -MB     PT Received On 01/18/24  -MB 01/18/24  -MB        Time Calculation- PT    Total Timed Code Minutes- PT 0 minute(s)  -MB 0 minute(s)  -MB               User Key  (r) = Recorded By, (t) = Taken By, (c) = Cosigned By      Initials Name Provider Type    Andreas Alonso, PT Physical  Therapist                       PT Discharge Summary  Anticipated Discharge Disposition (PT): luz Tuttle, PT  1/18/2024

## 2024-01-18 NOTE — DISCHARGE INSTRUCTIONS
Take medications as prescribed.  Ice for the first couple days then may use heat.  Keep follow-up appointment with orthopedic specialist.  Return for new or worsening symptoms.

## 2024-02-06 ENCOUNTER — TELEPHONE (OUTPATIENT)
Dept: CARDIOLOGY | Facility: CLINIC | Age: 64
End: 2024-02-06
Payer: MEDICARE

## 2024-02-06 NOTE — TELEPHONE ENCOUNTER
Caller: SEGUNDO CHARLES    Relationship to patient: Emergency Contact    Best call back number: [phone]    Chief complaint: NONE    Type of visit: 1YEAR FOLLOW UP    Requested date: 4/10/24 PATIENT WAS RESCHEDULE BECAUSE IT WAS 1ST AVAILABLE, PLEASE ADVISE IF TIMEFRAME NEEDS TO BE CHANGED     If rescheduling, when is the original appointment: 2/6/24

## 2024-02-06 NOTE — TELEPHONE ENCOUNTER
Patient is fine to be seen 4/10/24, unless patient is having any symptoms then we can have patient come in sooner

## 2024-04-10 ENCOUNTER — OFFICE VISIT (OUTPATIENT)
Dept: CARDIOLOGY | Facility: CLINIC | Age: 64
End: 2024-04-10
Payer: MEDICARE

## 2024-04-10 VITALS
OXYGEN SATURATION: 99 % | BODY MASS INDEX: 29.47 KG/M2 | HEART RATE: 59 BPM | SYSTOLIC BLOOD PRESSURE: 115 MMHG | WEIGHT: 199 LBS | HEIGHT: 69 IN | DIASTOLIC BLOOD PRESSURE: 72 MMHG

## 2024-04-10 DIAGNOSIS — I50.22 CHRONIC HFREF (HEART FAILURE WITH REDUCED EJECTION FRACTION): ICD-10-CM

## 2024-04-10 DIAGNOSIS — I10 PRIMARY HYPERTENSION: ICD-10-CM

## 2024-04-10 DIAGNOSIS — Q20.3: Primary | ICD-10-CM

## 2024-04-10 DIAGNOSIS — Z95.810 ICD (IMPLANTABLE CARDIOVERTER-DEFIBRILLATOR), DUAL, IN SITU: ICD-10-CM

## 2024-04-10 NOTE — PROGRESS NOTES
"    Subjective:     Encounter Date:04/10/2024      Patient ID: Jens Jean-Baptiste is a 63 y.o. male.    Chief Complaint:  History of Present Illness 63-year-old white male with history of complete transposition of great arteries along with HFrEF hypertension presents to my office for follow-up and patient is currently stable without any symptoms of chest pain or shortness of breath at rest or exertion.  No complaint of any PND orthopnea.  No palpitation dizziness syncope patient has some mild swelling of the feet.  Patient is taking all the medicines regularly.  Patient does not smoke    The following portions of the patient's history were reviewed and updated as appropriate: allergies, current medications, past family history, past medical history, past social history, past surgical history, and problem list.  Past Medical History:   Diagnosis Date    Cardiomyopathy     CHF (congestive heart failure)     Hyperlipidemia     Hypertension     Nausea vomiting and diarrhea 12/16/2019     Past Surgical History:   Procedure Laterality Date    CARDIAC CATHETERIZATION  2017    CARDIAC DEFIBRILLATOR PLACEMENT N/A     COLONOSCOPY N/A 10/28/2021    Procedure: COLONOSCOPY;  Surgeon: AURELIA Bird MD;  Location: [facility] ENDOSCOPY;  Service: Gastroenterology;  Laterality: N/A;  diverticulosis      ENDOSCOPY N/A 10/28/2021    Procedure: ESOPHAGOGASTRODUODENOSCOPY with biopsy x2 areas and dilitation (#54 bougie);  Surgeon: AURELIA Bird MD;  Location: [facility] ENDOSCOPY;  Service: Gastroenterology;  Laterality: N/A;  erosive esophagitis;erosive gastritis esophageal  stricture    UMBILICAL HERNIA REPAIR       /72   Pulse 59   Ht 175.3 cm (69\")   Wt 90.3 kg (199 lb)   SpO2 99%   BMI 29.39 kg/m²   Family History   Problem Relation Age of Onset    Dementia Mother     Cancer Father        Current Outpatient Medications:     carvedilol (COREG) 6.25 MG tablet, Take 0.5 tablets by mouth 2 (Two) Times a Day With Meals., Disp: " , Rfl:     ENTRESTO 49-51 MG tablet, Take 1 tablet by mouth 2 (Two) Times a Day., Disp: , Rfl:     spironolactone (ALDACTONE) 25 MG tablet, , Disp: , Rfl:     aspirin (aspirin) 81 MG EC tablet, Take 81 mg by mouth Daily. (Patient not taking: Reported on 12/28/2023), Disp: , Rfl:     HYDROcodone-acetaminophen (NORCO) 7.5-325 MG per tablet, Take 1 tablet by mouth Every 6 (Six) Hours As Needed for Moderate Pain or Severe Pain. (Patient not taking: Reported on 4/10/2024), Disp: 10 tablet, Rfl: 0    methylPREDNISolone (MEDROL) 4 MG dose pack, Take as directed on package instructions. (Patient not taking: Reported on 4/10/2024), Disp: 21 tablet, Rfl: 0  Allergies   Allergen Reactions    Hydrocodone Urinary Retention     Social History     Socioeconomic History    Marital status:    Tobacco Use    Smoking status: Never    Smokeless tobacco: Never   Vaping Use    Vaping status: Never Used   Substance and Sexual Activity    Alcohol use: Never    Drug use: Never    Sexual activity: Defer     Review of Systems   Constitutional: Negative for malaise/fatigue.   Cardiovascular:  Positive for leg swelling. Negative for chest pain, dyspnea on exertion and palpitations.   Respiratory:  Negative for cough and shortness of breath.    Gastrointestinal:  Negative for abdominal pain, nausea and vomiting.   Neurological:  Negative for dizziness, focal weakness, headaches, light-headedness and numbness.   All other systems reviewed and are negative.             Objective:     Constitutional:       Appearance: Well-developed.   Eyes:      General: No scleral icterus.     Conjunctiva/sclera: Conjunctivae normal.   HENT:      Head: Normocephalic and atraumatic.   Neck:      Vascular: No carotid bruit or JVD.   Pulmonary:      Effort: Pulmonary effort is normal.      Breath sounds: Normal breath sounds. No wheezing. No rales.   Cardiovascular:      Normal rate. Regular rhythm.   Pulses:     Intact distal pulses.   Abdominal:       General: Bowel sounds are normal.      Palpations: Abdomen is soft.   Musculoskeletal:      Cervical back: Normal range of motion and neck supple. Skin:     General: Skin is warm and dry.      Findings: No rash.   Neurological:      Mental Status: Alert.       Procedures    Lab Review:         MDM  #1 complete transposition of great arteries  Patient had complete transposition of great arteries and is managed by the heart failure team at [facility]  2.  HFrEF  Patient has congestive heart with LV systolic dysfunction is currently on carvedilol and Entresto and spironolactone and is stable now  3.  History of nonischemic cardiomyopathy status post ICD placement  Patient's ICD is working very well      Patient's previous medical records, labs, and EKG were reviewed and discussed with the patient at today's visit.

## 2024-04-22 ENCOUNTER — OFFICE VISIT (OUTPATIENT)
Dept: FAMILY MEDICINE CLINIC | Facility: CLINIC | Age: 64
End: 2024-04-22
Payer: MEDICARE

## 2024-04-22 VITALS
SYSTOLIC BLOOD PRESSURE: 128 MMHG | RESPIRATION RATE: 16 BRPM | HEART RATE: 66 BPM | HEIGHT: 69 IN | OXYGEN SATURATION: 96 % | DIASTOLIC BLOOD PRESSURE: 78 MMHG | BODY MASS INDEX: 29.53 KG/M2 | WEIGHT: 199.4 LBS

## 2024-04-22 DIAGNOSIS — K64.5 EXTERNAL THROMBOSED HEMORRHOIDS: Primary | ICD-10-CM

## 2024-04-22 PROCEDURE — 3074F SYST BP LT 130 MM HG: CPT | Performed by: FAMILY MEDICINE

## 2024-04-22 PROCEDURE — 1160F RVW MEDS BY RX/DR IN RCRD: CPT | Performed by: FAMILY MEDICINE

## 2024-04-22 PROCEDURE — 99213 OFFICE O/P EST LOW 20 MIN: CPT | Performed by: FAMILY MEDICINE

## 2024-04-22 PROCEDURE — 3078F DIAST BP <80 MM HG: CPT | Performed by: FAMILY MEDICINE

## 2024-04-22 PROCEDURE — 1159F MED LIST DOCD IN RCRD: CPT | Performed by: FAMILY MEDICINE

## 2024-04-22 RX ORDER — TRIAMCINOLONE ACETONIDE 1 MG/G
1 OINTMENT TOPICAL 2 TIMES DAILY
Qty: 15 G | Refills: 2 | Status: SHIPPED | OUTPATIENT
Start: 2024-04-22

## 2024-04-22 NOTE — PROGRESS NOTES
"Chief Complaint  Hemorrhoids (Some rectal itching and burning, going on for 2-3 weeks)    Subjective        Jnes Jean-Baptiste presents to [facility]  History of Present Illness  The patient is a 63-year-old male who presents for evaluation of hemorrhoids.    The patient has been experiencing discomfort due to hemorrhoids for a duration of 2.5 to 3 weeks. He speculates that straining during defecation may have contributed to the onset of his symptoms. He describes his stool as occasionally mushy, resembling peanut butter, and incomplete evacuation, which he attributes to his medication. He denies experiencing hard or brick-like stools, but reports post-defecation itching. He denies any presence of blood in his stool or perceived tear in the anal outlet. He perceives an increase in the size of his hemorrhoid compared to his previous visit, describing a palpable protrusion. He has been self-medicating with Preparation H twice daily for the past few weeks, which he reports as beneficial. He underwent a colonoscopy approximately 3 years ago. He denies the use of any fiber supplements. His urinary stream varies.    Supplemental Information  When he had the defibrillator and pacemaker placed, his heart rate went from 49 to 61.       Objective   Vital Signs:  /78   Pulse 66   Resp 16   Ht 175.3 cm (69\")   Wt 90.4 kg (199 lb 6.4 oz)   SpO2 96%   BMI 29.45 kg/m²   Estimated body mass index is 29.45 kg/m² as calculated from the following:    Height as of this encounter: 175.3 cm (69\").    Weight as of this encounter: 90.4 kg (199 lb 6.4 oz).       BMI is >= 25 and <30. (Overweight) The following options were offered after discussion;: weight loss educational material (shared in after visit summary), exercise counseling/recommendations, and nutrition counseling/recommendations      Physical Exam  Genitourinary:     Prostate: Normal.      Comments: Rectal ring:   ext thrombosed " hemorrhoid.  2 o'clock       Result Review :  Results      Assessment & Plan  The patient is a 63-year-old white male who noticed about 3 to 4 weeks ago a perirectal nodule that grew for the first week and then seemed to stop growing and has just been present when he cleans himself after a bowel movement.    1. External thrombosed hemorrhoid at about the 2 o'clock position.  The patient's bowel movements are typically pasty in consistency, with no sensation of complete evacuation. The stools are not large or painful, and there is no presence of blood. The rectal area does not exhibit any pain or spasm during defecation. The patient is managing well, with no signs of inflammation or pain. I have advised the patient to apply triamcinolone to the area, in conjunction with lubrication in the form of Preparation H. Given that it is not painful, I do not see any reason to cathleen it. I have informed him that if the condition worsens, we could open it up and drain the clot, however, I do not foresee this option as it is not painful.            Follow Up     Return in about 6 months (around 10/22/2024), or if symptoms worsen or fail to improve, for Recheck.  Patient was given instructions and counseling regarding his condition or for health maintenance advice. Please see specific information pulled into the AVS if appropriate.     Patient or patient representative verbalized consent for the use of Ambient Listening during the visit with  Nathan Stout MD for chart documentation. 4/22/2024  14:58 EDT

## 2024-10-23 ENCOUNTER — OFFICE VISIT (OUTPATIENT)
Dept: FAMILY MEDICINE CLINIC | Facility: CLINIC | Age: 64
End: 2024-10-23
Payer: MEDICARE

## 2024-10-23 VITALS
WEIGHT: 198 LBS | DIASTOLIC BLOOD PRESSURE: 64 MMHG | RESPIRATION RATE: 16 BRPM | BODY MASS INDEX: 29.33 KG/M2 | OXYGEN SATURATION: 97 % | HEART RATE: 54 BPM | SYSTOLIC BLOOD PRESSURE: 110 MMHG | HEIGHT: 69 IN

## 2024-10-23 DIAGNOSIS — M54.9 MID BACK PAIN ON RIGHT SIDE: Primary | ICD-10-CM

## 2024-10-23 DIAGNOSIS — M17.11 PRIMARY OSTEOARTHRITIS OF RIGHT KNEE: ICD-10-CM

## 2024-10-23 PROCEDURE — 1160F RVW MEDS BY RX/DR IN RCRD: CPT | Performed by: FAMILY MEDICINE

## 2024-10-23 PROCEDURE — G2211 COMPLEX E/M VISIT ADD ON: HCPCS | Performed by: FAMILY MEDICINE

## 2024-10-23 PROCEDURE — 3074F SYST BP LT 130 MM HG: CPT | Performed by: FAMILY MEDICINE

## 2024-10-23 PROCEDURE — 1125F AMNT PAIN NOTED PAIN PRSNT: CPT | Performed by: FAMILY MEDICINE

## 2024-10-23 PROCEDURE — 99213 OFFICE O/P EST LOW 20 MIN: CPT | Performed by: FAMILY MEDICINE

## 2024-10-23 PROCEDURE — 3078F DIAST BP <80 MM HG: CPT | Performed by: FAMILY MEDICINE

## 2024-10-23 PROCEDURE — 1159F MED LIST DOCD IN RCRD: CPT | Performed by: FAMILY MEDICINE

## 2024-10-23 NOTE — PROGRESS NOTES
"Chief Complaint  Back Pain (Pain in back for the last 5-6 months)    Subjective        Jens Jean-Baptiste presents to [facility]  History of Present Illness  The patient is a 64-year-old male who presents for evaluation of multiple medical concerns. He is accompanied by an adult female.    He reports experiencing pain in the middle of his back, described as a dull ache that intensifies with movement. This discomfort has been persistent for the past 6 months. He mentions a mole in the same area. He has not engaged in any recent heavy lifting or strenuous activities that could have triggered the pain. He has not been prescribed any muscle relaxants and has not performed push-ups in the last 20 years.    He has been receiving injections in his knee and shoulder for several years. Due to significant shrinkage of the tendons in his shoulder, surgery is no longer a viable option. He suffers from arthritis in his knee, which is now bone-on-bone. Despite receiving gel injections, he has not experienced any relief and is considering knee replacement surgery. His right knee is more severely affected than the left. He has had x-rays of his knees taken [facility].    He had a bubble on his rectum and was given some cream, which resolved the issue. He has a pacemaker and has been feeling a little tired before and after its implantation. Initially, he felt better post-implantation, but he is now experiencing fatigue again. He was informed that the pacemaker battery has 9 years left. He has an appointment scheduled for next month.       Objective   Vital Signs:  /64   Pulse 54   Resp 16   Ht 175.3 cm (69\")   Wt 89.8 kg (198 lb)   SpO2 97%   BMI 29.24 kg/m²   Estimated body mass index is 29.24 kg/m² as calculated from the following:    Height as of this encounter: 175.3 cm (69\").    Weight as of this encounter: 89.8 kg (198 lb).               Physical " Exam  Constitutional:       Appearance: Normal appearance. He is well-developed and normal weight.   HENT:      Head: Normocephalic and atraumatic.      Right Ear: Tympanic membrane, ear canal and external ear normal.      Left Ear: Tympanic membrane, ear canal and external ear normal.      Nose: Nose normal.      Mouth/Throat:      Mouth: Mucous membranes are moist.      Pharynx: Oropharynx is clear. No oropharyngeal exudate.   Eyes:      Extraocular Movements: Extraocular movements intact.      Conjunctiva/sclera: Conjunctivae normal.      Pupils: Pupils are equal, round, and reactive to light.   Cardiovascular:      Rate and Rhythm: Normal rate and regular rhythm.      Pulses: Normal pulses.      Heart sounds: Normal heart sounds.   Pulmonary:      Effort: Pulmonary effort is normal.      Breath sounds: Normal breath sounds.   Abdominal:      General: Bowel sounds are normal.      Palpations: Abdomen is soft.   Musculoskeletal:         General: Normal range of motion.      Cervical back: Normal range of motion and neck supple.   Skin:     General: Skin is warm and dry.   Neurological:      General: No focal deficit present.      Mental Status: He is alert and oriented to person, place, and time. Mental status is at baseline.   Psychiatric:         Mood and Affect: Mood normal.         Behavior: Behavior normal.         Thought Content: Thought content normal.         Judgment: Judgment normal.        Result Review :    Results      Assessment & Plan  1. Mid back pain.  He reports intense back pain in the upper mid back, specifically around the T8-T10 area, with tenderness extending about 2 inches from the spinous processes. The pain is described as a dull, numbing pain that worsens with movement. There is no redness, heat, or rash. Physical examination reveals tenderness in the paraspinal muscles. He is referred to physical therapy at [facility] for deep massage, ultrasound, and stretching. If symptoms  persist after 4-5 weeks of therapy, pain management for injections will be considered. He is advised to apply heat and perform stretching exercises that target the painful areas.    2. Primary osteoarthritis of the knees.  He has severe arthritis in both knees, with the right knee being particularly bad and no longer responding to lubricant injections. He is referred to Dr. Michael Orantes for evaluation and potential knee replacement surgery. He is informed that recovery from knee replacement surgery typically involves walking on the same day and full recovery within approximately 4 weeks. He is advised to inform Dr. Orantes about his pacemaker and defibrillator, as this may affect the need for an MRI.    3. Medication Management.  He mentions having a pacemaker and reports a heart rate of 53, which is lower than his usual 60-61 bpm. Upon examination, his heart rate is found to be around 60-61 bpm, suggesting the initial reading was inaccurate. He is advised to monitor his heart rate at home and contact his cardiologist if it drops below 60 bpm. He has an appointment with his cardiologist next month.              Follow Up     Return in about 6 months (around 4/23/2025), or if symptoms worsen or fail to improve, for Annual physical.  Patient was given instructions and counseling regarding his condition or for health maintenance advice. Please see specific information pulled into the AVS if appropriate.     Patient or patient representative verbalized consent for the use of Ambient Listening during the visit with  Nathan Stout MD for chart documentation. 10/23/2024  14:46 EDT

## 2024-12-08 ENCOUNTER — APPOINTMENT (OUTPATIENT)
Dept: GENERAL RADIOLOGY | Facility: HOSPITAL | Age: 64
End: 2024-12-08
Payer: MEDICARE

## 2024-12-08 ENCOUNTER — HOSPITAL ENCOUNTER (EMERGENCY)
Facility: HOSPITAL | Age: 64
Discharge: HOME | End: 2024-12-08
Admitting: EMERGENCY MEDICINE
Payer: MEDICARE

## 2024-12-08 VITALS
WEIGHT: 205.2 LBS | TEMPERATURE: 98 F | DIASTOLIC BLOOD PRESSURE: 70 MMHG | SYSTOLIC BLOOD PRESSURE: 132 MMHG | HEART RATE: 65 BPM | OXYGEN SATURATION: 96 % | BODY MASS INDEX: 30.39 KG/M2 | HEIGHT: 69 IN | RESPIRATION RATE: 16 BRPM

## 2024-12-08 DIAGNOSIS — M25.511 ACUTE PAIN OF RIGHT SHOULDER: Primary | ICD-10-CM

## 2024-12-08 DIAGNOSIS — S43.401A SPRAIN OF RIGHT SHOULDER, UNSPECIFIED SHOULDER SPRAIN TYPE, INITIAL ENCOUNTER: ICD-10-CM

## 2024-12-08 PROCEDURE — 99283 EMERGENCY DEPT VISIT LOW MDM: CPT

## 2024-12-08 PROCEDURE — 63710000001 ONDANSETRON ODT 4 MG TABLET DISPERSIBLE

## 2024-12-08 PROCEDURE — 73030 X-RAY EXAM OF SHOULDER: CPT

## 2024-12-08 RX ORDER — ONDANSETRON 4 MG/1
4 TABLET, ORALLY DISINTEGRATING ORAL ONCE
Status: COMPLETED | OUTPATIENT
Start: 2024-12-08 | End: 2024-12-08

## 2024-12-08 RX ORDER — OXYCODONE HYDROCHLORIDE 5 MG/1
5 TABLET ORAL ONCE
Status: COMPLETED | OUTPATIENT
Start: 2024-12-08 | End: 2024-12-08

## 2024-12-08 RX ORDER — OXYCODONE HYDROCHLORIDE 5 MG/1
5 TABLET ORAL EVERY 4 HOURS PRN
Qty: 15 TABLET | Refills: 0 | Status: SHIPPED | OUTPATIENT
Start: 2024-12-08 | End: 2024-12-16

## 2024-12-08 RX ORDER — ONDANSETRON 4 MG/1
8 TABLET, ORALLY DISINTEGRATING ORAL EVERY 8 HOURS PRN
Qty: 30 TABLET | Refills: 0 | Status: SHIPPED | OUTPATIENT
Start: 2024-12-08 | End: 2024-12-16

## 2024-12-08 RX ORDER — IBUPROFEN 800 MG/1
800 TABLET, FILM COATED ORAL EVERY 8 HOURS PRN
Qty: 15 TABLET | Refills: 0 | Status: SHIPPED | OUTPATIENT
Start: 2024-12-08

## 2024-12-08 RX ORDER — IBUPROFEN 400 MG/1
800 TABLET, FILM COATED ORAL ONCE
Status: COMPLETED | OUTPATIENT
Start: 2024-12-08 | End: 2024-12-08

## 2024-12-08 RX ADMIN — ONDANSETRON 4 MG: 4 TABLET, ORALLY DISINTEGRATING ORAL at 19:25

## 2024-12-08 RX ADMIN — IBUPROFEN 800 MG: 400 TABLET, FILM COATED ORAL at 19:25

## 2024-12-08 RX ADMIN — OXYCODONE 5 MG: 5 TABLET ORAL at 19:25

## 2024-12-09 NOTE — DISCHARGE INSTRUCTIONS
You were evaluated in the emergency department today for your right shoulder pain after your fall yesterday.  Imaging indicates no acute fracture however you may still have ligament or tendon injury.  Please wear the sling until you follow-up with orthopedics.  Call Dr. Orantes first thing in the morning to schedule an appointment.  If Dr. Orantes cannot get you in quickly you may call Dr. George's office to see if they can get you in any sooner.    Take pain medication as prescribed.  Use caution when taking this medication as it could make you sleepy or unable to make decisions.    Return to the ER for any new or worsening symptoms.

## 2024-12-09 NOTE — ED NOTES
Pt fell yesterday and now is complaining of pain in right shoulder. Pt is unable to lift shoulder up

## 2024-12-09 NOTE — ED PROVIDER NOTES
Subjective   History of Present Illness  Patient is a 64-year-old man who reports that he had a fall yesterday.  Denies any other symptoms however is having shoulder pain that is 10 out of 10.  It is not gotten better.  Denies any past history with this right shoulder.  Is any numbness or tingling.  Is unable to move it above 90 degrees without significant pain.  Review of Systems  Per HPI  Past Medical History:   Diagnosis Date    Cardiomyopathy     CHF (congestive heart failure)     Hyperlipidemia     Hypertension     Nausea vomiting and diarrhea 12/16/2019       Allergies   Allergen Reactions    Hydrocodone Urinary Retention       Past Surgical History:   Procedure Laterality Date    CARDIAC CATHETERIZATION  2017    CARDIAC DEFIBRILLATOR PLACEMENT N/A     COLONOSCOPY N/A 10/28/2021    Procedure: COLONOSCOPY;  Surgeon: AURELIA Bird MD;  Location: [facility];  Service: Gastroenterology;  Laterality: N/A;  diverticulosis      ENDOSCOPY N/A 10/28/2021    Procedure: ESOPHAGOGASTRODUODENOSCOPY with biopsy x2 areas and dilitation (#54 bougie);  Surgeon: AURELIA Bird MD;  Location: [facility] ENDOSCOPY;  Service: Gastroenterology;  Laterality: N/A;  erosive esophagitis;erosive gastritis esophageal  stricture    UMBILICAL HERNIA REPAIR         Family History   Problem Relation Age of Onset    Dementia Mother     Cancer Father        Social History     Socioeconomic History    Marital status:    Tobacco Use    Smoking status: Never    Smokeless tobacco: Never   Vaping Use    Vaping status: Never Used   Substance and Sexual Activity    Alcohol use: Never    Drug use: Never    Sexual activity: Defer           Objective   Physical Exam  Vitals and nursing note reviewed.   Constitutional:       General: He is not in acute distress.     Appearance: Normal appearance. He is not ill-appearing, toxic-appearing or diaphoretic.   HENT:      Head: Atraumatic.      Right Ear: Tympanic membrane, ear canal and  "external ear normal.      Left Ear: Tympanic membrane, ear canal and external ear normal.      Nose: Nose normal.      Mouth/Throat:      Mouth: Mucous membranes are moist.      Pharynx: Oropharynx is clear.   Eyes:      Extraocular Movements: Extraocular movements intact.      Conjunctiva/sclera: Conjunctivae normal.      Pupils: Pupils are equal, round, and reactive to light.   Cardiovascular:      Rate and Rhythm: Normal rate and regular rhythm.      Pulses: Normal pulses.      Heart sounds: Normal heart sounds.   Pulmonary:      Effort: Pulmonary effort is normal.      Breath sounds: Normal breath sounds.   Abdominal:      General: Bowel sounds are normal.      Palpations: Abdomen is soft.   Musculoskeletal:         General: Tenderness and signs of injury present.      Right shoulder: Tenderness present. Decreased range of motion. Normal pulse.      Left shoulder: Normal.      Cervical back: Normal range of motion and neck supple.   Skin:     General: Skin is warm and dry.      Capillary Refill: Capillary refill takes less than 2 seconds.   Neurological:      General: No focal deficit present.      Mental Status: He is alert.   Psychiatric:         Mood and Affect: Mood normal.         Behavior: Behavior normal.         Thought Content: Thought content normal.         Judgment: Judgment normal.         Procedures           ED Course        /70   Pulse 65   Temp 98 °F (36.7 °C) (Oral)   Resp 16   Ht 175.3 cm (69\")   Wt 93.1 kg (205 lb 3.2 oz)   SpO2 96%   BMI 30.30 kg/m²   Labs Reviewed - No data to display  Medications   oxyCODONE (ROXICODONE) immediate release tablet 5 mg (has no administration in time range)   ibuprofen (ADVIL,MOTRIN) tablet 800 mg (has no administration in time range)   ondansetron ODT (ZOFRAN-ODT) disintegrating tablet 4 mg (has no administration in time range)     .d1day  XR Shoulder 2+ View Right    Result Date: 12/8/2024  Impression: No acute osseous abnormality is identified " of the right shoulder. Electronically Signed: Emma Brady  12/8/2024 4:08 PM EST  Workstation ID: [id number]                                                  Medical Decision Making    Patient presents to the ED for the above complaint, underwent the above exam and workup.    Differential diagnosis considered: AC separation, fracture, rotator cuff tear    Patient was treated with the following medications while in the ED;   Medications   oxyCODONE (ROXICODONE) immediate release tablet 5 mg (has no administration in time range)   ibuprofen (ADVIL,MOTRIN) tablet 800 mg (has no administration in time range)   ondansetron ODT (ZOFRAN-ODT) disintegrating tablet 4 mg (has no administration in time range)     [place] patient patient imaging was obtained that showed no acute osseous abnormality.  As patient is unable to move his arm without any intense pain above 90 a sling was placed he was given follow-up with orthopedics.  He is already established with Dr. Orantes and will call them in the morning for an appointment.  He was given oxycodone Tylenol and Zofran here in the ED and sent home with a short course of those for home use.    Consideration was given for admission, but the patient was stable for outpatient management as patient was ambulatory, nontoxic, stable, and afebrile.  Exam as above.    Disposition: Discussed need to follow-up diagnostics, including incidental findings.  Discharged with instructions to obtain outpatient follow-up with patient's symptoms and findings, with strict return precautions if patient develops new or worsening symptoms.    This document is intended for medical expert use only. Reading of this document by patients and/or patient's family without participating medical staff guidance may result in misinterpretation and unintended morbidity.  Any interpretation of such data is the responsibility of the patient and/or family member responsible for the patient in concert with their primary or  specialist providers, not to be left for sources of online searches such as [employer], [employer] or similar queries. Relying on these approaches to knowledge may result in misinterpretation, misguided goals of care and even death should patients or family members try recommendations outside of the realm of professional medical care in a supervised inpatient environment.     Final diagnoses:   Acute pain of right shoulder   Sprain of right shoulder, unspecified shoulder sprain type, initial encounter       ED Disposition  ED Disposition       ED Disposition   Discharge    Condition   Stable    Comment   --               Nathan Stout MD  [address] [facility]   [address]  [place] [address]  [phone]          Michael Orantes MD  [address] [facility] [place] [address] [phone]          Tavon George MD  [facility] [address] [place] [address] [phone]               Medication List        New Prescriptions      ibuprofen 800 MG tablet  Commonly known as: ADVIL,MOTRIN  Take 1 tablet by mouth Every 8 (Eight) Hours As Needed for Mild Pain for up to 15 doses.     ondansetron ODT 4 MG disintegrating tablet  Commonly known as: ZOFRAN-ODT  Take 2 tablets by mouth Every 8 (Eight) Hours As Needed for Nausea or Vomiting for up to 15 doses.     oxyCODONE 5 MG immediate release tablet  Commonly known as: Roxicodone  Take 1 tablet by mouth Every 4 (Four) Hours As Needed for Moderate Pain for up to 15 doses.               Where to Get Your Medications        These medications were sent to [facility] [id number] - [place], [place] - [address] - [phone]  - [phone] FX  [address] [place] [address]      Phone: [phone]   ibuprofen 800 MG tablet  ondansetron ODT 4 MG disintegrating tablet  oxyCODONE 5 MG immediate release tablet            Ebony Gee, APRN  12/08/24 1921

## 2024-12-12 ENCOUNTER — PATIENT OUTREACH (OUTPATIENT)
Dept: CASE MANAGEMENT | Facility: CLINIC | Age: 64
End: 2024-12-12
Payer: MEDICARE

## 2024-12-12 DIAGNOSIS — M51.369 DEGENERATION OF INTERVERTEBRAL DISC OF LUMBAR REGION, UNSPECIFIED WHETHER PAIN PRESENT: ICD-10-CM

## 2024-12-12 DIAGNOSIS — E78.00 PURE HYPERCHOLESTEROLEMIA: Primary | ICD-10-CM

## 2024-12-12 NOTE — OUTREACH NOTE
AMBULATORY CASE MANAGEMENT NOTE    Names and Relationships of Patient/Support Persons: Contact: Jens Jean-Baptiste; Relationship: Self -     CCM Interim Update    Spoke with patient at this time regarding recent ER visit, identified self and role.  Patient states he is doing ok since his ER visit, verified he has an upcoming appt with ortho for f/u from the ER.  Patient declines ER f/u appt with PCP.  Offered assistance with chronic disease management through CCM, patient declines.  Patient denies any other needs at this time, will close program.      Elizabeth NICHOLAS  Ambulatory Case Management    12/12/2024, 13:57 EST

## 2024-12-16 ENCOUNTER — OFFICE VISIT (OUTPATIENT)
Dept: ORTHOPEDIC SURGERY | Facility: CLINIC | Age: 64
End: 2024-12-16
Payer: MEDICARE

## 2024-12-16 VITALS — OXYGEN SATURATION: 98 % | WEIGHT: 205 LBS | HEIGHT: 69 IN | BODY MASS INDEX: 30.36 KG/M2

## 2024-12-16 DIAGNOSIS — M25.511 ACUTE PAIN OF RIGHT SHOULDER: ICD-10-CM

## 2024-12-16 DIAGNOSIS — S49.91XA ACROMIOCLAVICULAR (AC) JOINT INJURY, RIGHT, INITIAL ENCOUNTER: Primary | ICD-10-CM

## 2024-12-16 PROCEDURE — 99203 OFFICE O/P NEW LOW 30 MIN: CPT | Performed by: PHYSICIAN ASSISTANT

## 2024-12-16 PROCEDURE — 1159F MED LIST DOCD IN RCRD: CPT | Performed by: PHYSICIAN ASSISTANT

## 2024-12-16 PROCEDURE — 1160F RVW MEDS BY RX/DR IN RCRD: CPT | Performed by: PHYSICIAN ASSISTANT

## 2024-12-16 NOTE — PROGRESS NOTES
"Jens is a 64 y.o. year old male presents to [facility]    Chief Complaint   Patient presents with    Right Shoulder - Initial Evaluation     Fell on 12/07/2024       History of Present Illness  Jens Jean-Baptiste is a right handed 64 y.o. male presents to clinic with his wife, Aysha, for evaluation of Right shoulder secondary to a fall onto the lateral aspect of his shoulder striking [facility] on 12/7/24. Qualifies as sharp immediately during the fall. Reports pain & weakness, cannot abduct his arm, nor forward flex. Tx: Roxicodone 5mg (but dc'd due to cannot urinate), ibuprofen 800 mg, rest, sling, activity modifications. Denies any surgeries nor injuries to this joint.         I have reviewed the patient's medical, family, and social history in detail and updated the computerized patient record.    Objective:  Ht 175.3 cm (69\")   Wt 93 kg (205 lb)   SpO2 98%   BMI 30.27 kg/m²      Physical Exam    Vital signs reviewed.   General: No acute distress.  Eyes: conjunctiva clear  ENT: external ears atraumatic  CV: no peripheral edema  Resp: normal respiratory effort  Skin: no rashes or wounds; normal turgor  Psych: mood and affect appropriate; recent and remote memory intact  Neuro: sensation to light touch intact    MSK Exam      Right shoulder:  Intact skin. No apparent effusion nor atrophy  Tenderness over the AC joint and mildly over anterior joint line  Tenderness over bicipital groove  Passive forward flexion 145, abduction 110 with pain and crepitation at AC jiont, ER 40  Active IR L1  Pain and weakness with o'layo & supraspinatus/marixa  Positive speed  Negative scarf  Belly press 5/5; lift off 4/5 with pain  ROM at the elbow, wrist & digits grossly intact.    strength 5/5 and equal bilaterally   Sensation to light touch intact in all distributions.    Imaging:    XR Shoulder 2+ View Right (12/08/2024 15:45)   Findings:  No acute fracture or dislocation is " identified. There is mild arthritic narrowing of the acromioclavicular joint. Included right lung appears clear.     IMPRESSION:    No acute osseous abnormality is identified of the right shoulder.      Assessment:  Diagnoses and all orders for this visit:    Acromioclavicular (AC) joint injury, right, initial encounter    Acute pain of right shoulder    Plan: Recommend voltaren gel applied 3 times daily to AC joint, continue ibuprofen as needed (not to exceed 1,900mg daily), and perform HEP (provided at checkout and via [email or web address]) daily until follow up in 2-3 weeks.          Follow Up   Return in about 2 weeks (around 12/30/2024) for Recheck; inj vs CT.  Patient was given instructions and counseling regarding his condition or for health maintenance advice. Please see specific information pulled into the AVS if appropriate.     EMR Dragon/Transcription disclaimer:    Much of this encounter note is an electronic transcription/translation of spoken language to printed text.  The electronic translation of spoken language may permit erroneous, or at times, nonsensical words or phrases to be inadvertently transcribed.  Although I have reviewed the note for such errors some may still exist.

## 2024-12-17 ENCOUNTER — PATIENT ROUNDING (BHMG ONLY) (OUTPATIENT)
Dept: ORTHOPEDIC SURGERY | Facility: CLINIC | Age: 64
End: 2024-12-17
Payer: MEDICARE

## 2024-12-30 ENCOUNTER — OFFICE VISIT (OUTPATIENT)
Dept: ORTHOPEDIC SURGERY | Facility: CLINIC | Age: 64
End: 2024-12-30
Payer: MEDICARE

## 2024-12-30 VITALS — WEIGHT: 205 LBS | OXYGEN SATURATION: 99 % | HEIGHT: 69 IN | BODY MASS INDEX: 30.36 KG/M2

## 2024-12-30 DIAGNOSIS — S49.91XD INJURY OF RIGHT ACROMIOCLAVICULAR JOINT, SUBSEQUENT ENCOUNTER: ICD-10-CM

## 2024-12-30 DIAGNOSIS — M25.511 ACUTE PAIN OF RIGHT SHOULDER: Primary | ICD-10-CM

## 2024-12-30 RX ADMIN — TRIAMCINOLONE ACETONIDE 80 MG: 40 INJECTION, SUSPENSION INTRA-ARTICULAR; INTRAMUSCULAR at 10:32

## 2024-12-30 RX ADMIN — LIDOCAINE HYDROCHLORIDE 2 ML: 10 INJECTION, SOLUTION EPIDURAL; INFILTRATION; INTRACAUDAL; PERINEURAL at 10:32

## 2024-12-30 NOTE — PROGRESS NOTES
"   Patient ID: Jens Jean-Baptiste is a right handed 64 y.o. male presents for further follow up on right AC joint pain after a fall 12/7 landing on right side. Since his last visit on 12/16, Reports a ache, no longer sharp. States he has pain at night, despite any positional changes.  Treatment: Voltaren gel and ice, ibuprofen 600mg 2-3 times daily. Also, notes pain over the vinay-lateral aspect of the upper shoulder. States he has to perform tasks slow; That he tried catching a falling pill with a quick movement of RUE and it elicited a sharp in the anterior shoulder.     Of note, reports he will be having right knee surgery, due to osteoarthritis, in March 2025.    Objective:  Ht 175.3 cm (69\")   Wt 93 kg (205 lb)   SpO2 99%   BMI 30.27 kg/m²     Physical Examination:     Right shoulder/AC:  Intact skin.  No apparent atrophy.  No effusion  Tenderness mildly to palpation over AC joint  Passive forward flexion 140, abduction 115 with pain, ER 30  Active IR L1  Mild pain but no weakness with [place] supraspinatus/Nancy  Negative Ramirez and from: Positive scarf  Negative Speed  Negative Neer  Belly press 5/5; lift off 5/5 with pain  's range of motion at the elbow wrist and digits grossly intact  Radial pulse palpable capillary refill is 1 second to all digits   strength 5/5 and equal bilaterally  Sensation light touch in all distributions    Imaging:   XR Shoulder 2+ View Right (12/08/2024 15:45)   Findings:  No acute fracture or dislocation is identified. There is mild arthritic narrowing of the acromioclavicular joint. Included right lung appears clear.     IMPRESSION:    No acute osseous abnormality is identified of the right shoulder.      Assessment:    Diagnoses and all orders for this visit:    1. Acute pain of right shoulder (Primary)  -     Large Joint Arthrocentesis: R subacromial bursa  -     lidocaine PF 1% (XYLOCAINE) injection 2 mL  -     triamcinolone acetonide (KENALOG-40) injection 80 mg    2. " Injury of right acromioclavicular joint, subsequent encounter    Plan: Recommend subacromial steroid injection to the right shoulder today for subjective pain relief and improved function.  The risk and benefits of this injection were discussed and patient wishes to proceed.  He may alternate Tylenol and ibuprofen as well as ice and a passive range of motion after heat applied for 5 to 10 minutes to maintain motion.  Follow-up in the spring after his knee replacement if he is interested in further treatment planning or advanced imaging.  All questions answered    Large Joint Arthrocentesis: R subacromial bursa  Date/Time: 12/30/2024 10:32 AM  Consent given by: patient  Site marked: site marked  Timeout: Immediately prior to procedure a time out was called to verify the correct patient, procedure, equipment, support staff and site/side marked as required   Supporting Documentation  Indications: pain   Procedure Details  Location: shoulder - R subacromial bursa  Preparation: Patient was prepped and draped in the usual sterile fashion  Needle size: 25 G  Approach: posterior  Medications administered: 80 mg triamcinolone acetonide 40 MG/ML; 2 mL lidocaine PF 1% 1 %  Patient tolerance: patient tolerated the procedure well with no immediate complications        Disclaimer: Part of this note may be an electronic transcription/translation of spoken language to printed text using the Dragon Dictation System

## 2024-12-31 RX ORDER — TRIAMCINOLONE ACETONIDE 40 MG/ML
80 INJECTION, SUSPENSION INTRA-ARTICULAR; INTRAMUSCULAR
Status: COMPLETED | OUTPATIENT
Start: 2024-12-30 | End: 2024-12-30

## 2024-12-31 RX ORDER — LIDOCAINE HYDROCHLORIDE 10 MG/ML
2 INJECTION, SOLUTION EPIDURAL; INFILTRATION; INTRACAUDAL; PERINEURAL
Status: COMPLETED | OUTPATIENT
Start: 2024-12-30 | End: 2024-12-30

## 2025-02-19 NOTE — TELEPHONE ENCOUNTER
Caller: SEGUNDO CHARLES    Relationship: Emergency Contact    Best call back number: [phone]    What was the call regarding: PT HAS 4 DAYS LEFT OF ENTRESTO 49/51 - WOULD LIKE TO COME  IF POSSIBLE    Do you require a callback: YES   100

## 2025-02-26 ENCOUNTER — HOSPITAL ENCOUNTER (OUTPATIENT)
Dept: CARDIOLOGY | Facility: HOSPITAL | Age: 65
Discharge: HOME | End: 2025-02-26
Payer: MEDICARE

## 2025-02-26 ENCOUNTER — LAB (OUTPATIENT)
Dept: LAB | Facility: HOSPITAL | Age: 65
End: 2025-02-26
Payer: MEDICARE

## 2025-02-26 ENCOUNTER — PRE-ADMISSION TESTING (OUTPATIENT)
Dept: PREADMISSION TESTING | Facility: HOSPITAL | Age: 65
End: 2025-02-26
Payer: MEDICARE

## 2025-02-26 VITALS
TEMPERATURE: 98.1 F | WEIGHT: 193 LBS | DIASTOLIC BLOOD PRESSURE: 77 MMHG | SYSTOLIC BLOOD PRESSURE: 119 MMHG | HEIGHT: 67 IN | RESPIRATION RATE: 18 BRPM | OXYGEN SATURATION: 95 % | BODY MASS INDEX: 30.29 KG/M2 | HEART RATE: 65 BPM

## 2025-02-26 LAB
ABO GROUP BLD: NORMAL
ALBUMIN SERPL-MCNC: 4 G/DL (ref 3.5–5.2)
ANION GAP SERPL CALCULATED.3IONS-SCNC: 9.8 MMOL/L (ref 5–15)
BLD GP AB SCN SERPL QL: NEGATIVE
BUN SERPL-MCNC: 25 MG/DL (ref 8–23)
BUN/CREAT SERPL: 19.1 (ref 7–25)
CALCIUM SPEC-SCNC: 9.3 MG/DL (ref 8.6–10.5)
CHLORIDE SERPL-SCNC: 106 MMOL/L (ref 98–107)
CO2 SERPL-SCNC: 23.2 MMOL/L (ref 22–29)
CREAT SERPL-MCNC: 1.31 MG/DL (ref 0.76–1.27)
DEPRECATED RDW RBC AUTO: 42.8 FL (ref 37–54)
EGFRCR SERPLBLD CKD-EPI 2021: 60.8 ML/MIN/1.73
ERYTHROCYTE [DISTWIDTH] IN BLOOD BY AUTOMATED COUNT: 12.8 % (ref 12.3–15.4)
GLUCOSE SERPL-MCNC: 88 MG/DL (ref 65–99)
HBA1C MFR BLD: 5.7 % (ref 4.8–5.6)
HCT VFR BLD AUTO: 38.9 % (ref 37.5–51)
HGB BLD-MCNC: 13.1 G/DL (ref 13–17.7)
INR PPP: 1.03 (ref 0.9–1.1)
MCH RBC QN AUTO: 31.1 PG (ref 26.6–33)
MCHC RBC AUTO-ENTMCNC: 33.7 G/DL (ref 31.5–35.7)
MCV RBC AUTO: 92.4 FL (ref 79–97)
MRSA DNA SPEC QL NAA+PROBE: NORMAL
PLATELET # BLD AUTO: 185 10*3/MM3 (ref 140–450)
PMV BLD AUTO: 10.2 FL (ref 6–12)
POTASSIUM SERPL-SCNC: 4.2 MMOL/L (ref 3.5–5.2)
PROTHROMBIN TIME: 13.4 SECONDS (ref 11.7–14.2)
QT INTERVAL: 434 MS
QTC INTERVAL: 438 MS
RBC # BLD AUTO: 4.21 10*6/MM3 (ref 4.14–5.8)
RH BLD: POSITIVE
SODIUM SERPL-SCNC: 139 MMOL/L (ref 136–145)
T&S EXPIRATION DATE: NORMAL
WBC NRBC COR # BLD AUTO: 6.04 10*3/MM3 (ref 3.4–10.8)

## 2025-02-26 PROCEDURE — 85610 PROTHROMBIN TIME: CPT

## 2025-02-26 PROCEDURE — 80048 BASIC METABOLIC PNL TOTAL CA: CPT

## 2025-02-26 PROCEDURE — 86901 BLOOD TYPING SEROLOGIC RH(D): CPT

## 2025-02-26 PROCEDURE — 83036 HEMOGLOBIN GLYCOSYLATED A1C: CPT

## 2025-02-26 PROCEDURE — 36415 COLL VENOUS BLD VENIPUNCTURE: CPT

## 2025-02-26 PROCEDURE — 93005 ELECTROCARDIOGRAM TRACING: CPT | Performed by: ORTHOPAEDIC SURGERY

## 2025-02-26 PROCEDURE — 86850 RBC ANTIBODY SCREEN: CPT

## 2025-02-26 PROCEDURE — 86900 BLOOD TYPING SEROLOGIC ABO: CPT

## 2025-02-26 PROCEDURE — 87641 MR-STAPH DNA AMP PROBE: CPT

## 2025-02-26 PROCEDURE — 82040 ASSAY OF SERUM ALBUMIN: CPT

## 2025-02-26 PROCEDURE — 85027 COMPLETE CBC AUTOMATED: CPT

## 2025-03-07 LAB
QT INTERVAL: 434 MS
QTC INTERVAL: 438 MS

## 2025-03-10 ENCOUNTER — ANESTHESIA (OUTPATIENT)
Dept: PERIOP | Facility: HOSPITAL | Age: 65
End: 2025-03-10
Payer: MEDICARE

## 2025-03-10 ENCOUNTER — HOSPITAL ENCOUNTER (OUTPATIENT)
Facility: HOSPITAL | Age: 65
Setting detail: HOSPITAL OUTPATIENT SURGERY
Discharge: HOME | End: 2025-03-10
Attending: ORTHOPAEDIC SURGERY | Admitting: ORTHOPAEDIC SURGERY
Payer: MEDICARE

## 2025-03-10 ENCOUNTER — ANESTHESIA EVENT (OUTPATIENT)
Dept: PERIOP | Facility: HOSPITAL | Age: 65
End: 2025-03-10
Payer: MEDICARE

## 2025-03-10 VITALS
OXYGEN SATURATION: 94 % | SYSTOLIC BLOOD PRESSURE: 107 MMHG | DIASTOLIC BLOOD PRESSURE: 60 MMHG | RESPIRATION RATE: 14 BRPM | HEART RATE: 60 BPM | TEMPERATURE: 97.6 F | WEIGHT: 192.6 LBS | HEIGHT: 67 IN | BODY MASS INDEX: 30.23 KG/M2

## 2025-03-10 DIAGNOSIS — Z96.651 STATUS POST RIGHT UNICOMPARTMENTAL KNEE REPLACEMENT: Primary | ICD-10-CM

## 2025-03-10 PROCEDURE — 25010000002 VANCOMYCIN PER 500 MG: Performed by: ORTHOPAEDIC SURGERY

## 2025-03-10 PROCEDURE — 25010000002 KETOROLAC TROMETHAMINE PER 15 MG: Performed by: ORTHOPAEDIC SURGERY

## 2025-03-10 PROCEDURE — C1713 ANCHOR/SCREW BN/BN,TIS/BN: HCPCS | Performed by: ORTHOPAEDIC SURGERY

## 2025-03-10 PROCEDURE — 25010000002 ROPIVACAINE PER 1 MG: Performed by: ANESTHESIOLOGY

## 2025-03-10 PROCEDURE — 25010000002 ONDANSETRON PER 1 MG

## 2025-03-10 PROCEDURE — 97161 PT EVAL LOW COMPLEX 20 MIN: CPT

## 2025-03-10 PROCEDURE — C1776 JOINT DEVICE (IMPLANTABLE): HCPCS | Performed by: ORTHOPAEDIC SURGERY

## 2025-03-10 PROCEDURE — 25810000003 SODIUM CHLORIDE 0.9 % SOLUTION 250 ML FLEX CONT

## 2025-03-10 PROCEDURE — 25010000002 CEFAZOLIN PER 500 MG: Performed by: ORTHOPAEDIC SURGERY

## 2025-03-10 PROCEDURE — 25010000002 BUPIVACAINE IN DEXTROSE 0.75-8.25 % SOLUTION: Performed by: ANESTHESIOLOGY

## 2025-03-10 PROCEDURE — 25010000002 PROPOFOL 10 MG/ML EMULSION

## 2025-03-10 PROCEDURE — 25010000002 VANCOMYCIN 1 G RECONSTITUTED SOLUTION: Performed by: ORTHOPAEDIC SURGERY

## 2025-03-10 PROCEDURE — 25010000002 DEXAMETHASONE PER 1 MG

## 2025-03-10 PROCEDURE — 25010000002 TRIAMCINOLONE PER 10 MG: Performed by: ORTHOPAEDIC SURGERY

## 2025-03-10 PROCEDURE — 25810000003 SODIUM CHLORIDE 0.9 % SOLUTION: Performed by: ORTHOPAEDIC SURGERY

## 2025-03-10 PROCEDURE — 25010000002 EPINEPHRINE PER 0.1 MG: Performed by: ORTHOPAEDIC SURGERY

## 2025-03-10 PROCEDURE — 25010000002 FENTANYL CITRATE (PF) 250 MCG/5ML SOLUTION: Performed by: ANESTHESIOLOGY

## 2025-03-10 PROCEDURE — 25010000002 DEXAMETHASONE PER 1 MG: Performed by: ORTHOPAEDIC SURGERY

## 2025-03-10 PROCEDURE — 25010000002 ROPIVACAINE PER 1 MG: Performed by: ORTHOPAEDIC SURGERY

## 2025-03-10 PROCEDURE — 25810000003 SODIUM CHLORIDE 0.9 % SOLUTION

## 2025-03-10 PROCEDURE — 25010000002 FENTANYL CITRATE (PF) 50 MCG/ML SOLUTION: Performed by: ANESTHESIOLOGY

## 2025-03-10 PROCEDURE — 25010000002 PHENYLEPHRINE 10 MG/ML SOLUTION 5 ML VIAL

## 2025-03-10 RX ORDER — ONDANSETRON 2 MG/ML
INJECTION INTRAMUSCULAR; INTRAVENOUS AS NEEDED
Status: DISCONTINUED | OUTPATIENT
Start: 2025-03-10 | End: 2025-03-10 | Stop reason: SURG

## 2025-03-10 RX ORDER — DEXAMETHASONE SODIUM PHOSPHATE 4 MG/ML
20 INJECTION, SOLUTION INTRA-ARTICULAR; INTRALESIONAL; INTRAMUSCULAR; INTRAVENOUS; SOFT TISSUE ONCE
Status: COMPLETED | OUTPATIENT
Start: 2025-03-10 | End: 2025-03-10

## 2025-03-10 RX ORDER — DIPHENHYDRAMINE HYDROCHLORIDE 50 MG/ML
12.5 INJECTION INTRAMUSCULAR; INTRAVENOUS
Status: DISCONTINUED | OUTPATIENT
Start: 2025-03-10 | End: 2025-03-10 | Stop reason: HOSPADM

## 2025-03-10 RX ORDER — LIDOCAINE HYDROCHLORIDE 10 MG/ML
0.5 INJECTION, SOLUTION EPIDURAL; INFILTRATION; INTRACAUDAL; PERINEURAL ONCE AS NEEDED
Status: DISCONTINUED | OUTPATIENT
Start: 2025-03-10 | End: 2025-03-10 | Stop reason: HOSPADM

## 2025-03-10 RX ORDER — ACETAMINOPHEN 500 MG
1000 TABLET ORAL ONCE
Status: COMPLETED | OUTPATIENT
Start: 2025-03-10 | End: 2025-03-10

## 2025-03-10 RX ORDER — ASPIRIN 81 MG/1
81 TABLET ORAL 2 TIMES DAILY
Qty: 60 TABLET | Refills: 0 | Status: SHIPPED | OUTPATIENT
Start: 2025-03-10 | End: 2025-04-09

## 2025-03-10 RX ORDER — HYDRALAZINE HYDROCHLORIDE 20 MG/ML
5 INJECTION INTRAMUSCULAR; INTRAVENOUS
Status: DISCONTINUED | OUTPATIENT
Start: 2025-03-10 | End: 2025-03-10 | Stop reason: HOSPADM

## 2025-03-10 RX ORDER — DIPHENHYDRAMINE HYDROCHLORIDE 50 MG/ML
12.5 INJECTION INTRAMUSCULAR; INTRAVENOUS ONCE AS NEEDED
Status: DISCONTINUED | OUTPATIENT
Start: 2025-03-10 | End: 2025-03-10 | Stop reason: HOSPADM

## 2025-03-10 RX ORDER — MELOXICAM 15 MG/1
15 TABLET ORAL ONCE
Status: COMPLETED | OUTPATIENT
Start: 2025-03-10 | End: 2025-03-10

## 2025-03-10 RX ORDER — PROPOFOL 10 MG/ML
VIAL (ML) INTRAVENOUS CONTINUOUS PRN
Status: DISCONTINUED | OUTPATIENT
Start: 2025-03-10 | End: 2025-03-10 | Stop reason: SURG

## 2025-03-10 RX ORDER — BUPIVACAINE HYDROCHLORIDE 7.5 MG/ML
INJECTION, SOLUTION INTRASPINAL
Status: COMPLETED | OUTPATIENT
Start: 2025-03-10 | End: 2025-03-10

## 2025-03-10 RX ORDER — ACETAMINOPHEN 500 MG
1000 TABLET ORAL EVERY 6 HOURS
Status: CANCELLED | OUTPATIENT
Start: 2025-03-10

## 2025-03-10 RX ORDER — ONDANSETRON 2 MG/ML
4 INJECTION INTRAMUSCULAR; INTRAVENOUS EVERY 6 HOURS PRN
Status: CANCELLED | OUTPATIENT
Start: 2025-03-10

## 2025-03-10 RX ORDER — GABAPENTIN 100 MG/1
100 CAPSULE ORAL 3 TIMES DAILY PRN
Qty: 30 CAPSULE | Refills: 0 | Status: SHIPPED | OUTPATIENT
Start: 2025-03-10

## 2025-03-10 RX ORDER — SODIUM CHLORIDE 0.9 % (FLUSH) 0.9 %
10 SYRINGE (ML) INJECTION AS NEEDED
Status: DISCONTINUED | OUTPATIENT
Start: 2025-03-10 | End: 2025-03-10 | Stop reason: HOSPADM

## 2025-03-10 RX ORDER — ONDANSETRON 2 MG/ML
4 INJECTION INTRAMUSCULAR; INTRAVENOUS ONCE AS NEEDED
Status: DISCONTINUED | OUTPATIENT
Start: 2025-03-10 | End: 2025-03-10 | Stop reason: HOSPADM

## 2025-03-10 RX ORDER — FENTANYL CITRATE 50 UG/ML
50 INJECTION, SOLUTION INTRAMUSCULAR; INTRAVENOUS
Status: DISCONTINUED | OUTPATIENT
Start: 2025-03-10 | End: 2025-03-10 | Stop reason: HOSPADM

## 2025-03-10 RX ORDER — ONDANSETRON 4 MG/1
4 TABLET, ORALLY DISINTEGRATING ORAL EVERY 6 HOURS PRN
Status: CANCELLED | OUTPATIENT
Start: 2025-03-10

## 2025-03-10 RX ORDER — FENTANYL CITRATE 50 UG/ML
INJECTION, SOLUTION INTRAMUSCULAR; INTRAVENOUS
Status: COMPLETED | OUTPATIENT
Start: 2025-03-10 | End: 2025-03-10

## 2025-03-10 RX ORDER — CHOLECALCIFEROL (VITAMIN D3) 25 MCG
TABLET ORAL 2 TIMES DAILY
COMMUNITY

## 2025-03-10 RX ORDER — NALOXONE HCL 0.4 MG/ML
0.4 VIAL (ML) INJECTION AS NEEDED
Status: DISCONTINUED | OUTPATIENT
Start: 2025-03-10 | End: 2025-03-10 | Stop reason: HOSPADM

## 2025-03-10 RX ORDER — TRAMADOL HYDROCHLORIDE 50 MG/1
50 TABLET ORAL EVERY 6 HOURS PRN
Qty: 28 TABLET | Refills: 0 | Status: SHIPPED | OUTPATIENT
Start: 2025-03-10

## 2025-03-10 RX ORDER — OXYCODONE HYDROCHLORIDE 5 MG/1
10 TABLET ORAL EVERY 4 HOURS PRN
Status: COMPLETED | OUTPATIENT
Start: 2025-03-10 | End: 2025-03-10

## 2025-03-10 RX ORDER — VANCOMYCIN HYDROCHLORIDE 1 G/20ML
INJECTION, POWDER, LYOPHILIZED, FOR SOLUTION INTRAVENOUS AS NEEDED
Status: DISCONTINUED | OUTPATIENT
Start: 2025-03-10 | End: 2025-03-10 | Stop reason: HOSPADM

## 2025-03-10 RX ORDER — CELECOXIB 100 MG/1
100 CAPSULE ORAL 2 TIMES DAILY
Qty: 60 CAPSULE | Refills: 0 | Status: SHIPPED | OUTPATIENT
Start: 2025-03-10

## 2025-03-10 RX ORDER — VANCOMYCIN HYDROCHLORIDE 500 MG/10ML
INJECTION, POWDER, LYOPHILIZED, FOR SOLUTION INTRAVENOUS AS NEEDED
Status: DISCONTINUED | OUTPATIENT
Start: 2025-03-10 | End: 2025-03-10 | Stop reason: HOSPADM

## 2025-03-10 RX ORDER — OXYCODONE HYDROCHLORIDE 5 MG/1
5 TABLET ORAL ONCE AS NEEDED
Status: DISCONTINUED | OUTPATIENT
Start: 2025-03-10 | End: 2025-03-10 | Stop reason: HOSPADM

## 2025-03-10 RX ORDER — TRANEXAMIC ACID 10 MG/ML
1000 INJECTION, SOLUTION INTRAVENOUS 2 TIMES DAILY
Status: COMPLETED | OUTPATIENT
Start: 2025-03-10 | End: 2025-03-10

## 2025-03-10 RX ORDER — IPRATROPIUM BROMIDE AND ALBUTEROL SULFATE 2.5; .5 MG/3ML; MG/3ML
3 SOLUTION RESPIRATORY (INHALATION) ONCE AS NEEDED
Status: DISCONTINUED | OUTPATIENT
Start: 2025-03-10 | End: 2025-03-10 | Stop reason: HOSPADM

## 2025-03-10 RX ORDER — ROPIVACAINE HYDROCHLORIDE 5 MG/ML
INJECTION, SOLUTION EPIDURAL; INFILTRATION; PERINEURAL
Status: COMPLETED | OUTPATIENT
Start: 2025-03-10 | End: 2025-03-10

## 2025-03-10 RX ORDER — DEXMEDETOMIDINE HYDROCHLORIDE 100 UG/ML
INJECTION, SOLUTION INTRAVENOUS
Status: COMPLETED | OUTPATIENT
Start: 2025-03-10 | End: 2025-03-10

## 2025-03-10 RX ORDER — DEXAMETHASONE SODIUM PHOSPHATE 4 MG/ML
INJECTION, SOLUTION INTRA-ARTICULAR; INTRALESIONAL; INTRAMUSCULAR; INTRAVENOUS; SOFT TISSUE AS NEEDED
Status: DISCONTINUED | OUTPATIENT
Start: 2025-03-10 | End: 2025-03-10 | Stop reason: SURG

## 2025-03-10 RX ORDER — LABETALOL HYDROCHLORIDE 5 MG/ML
5 INJECTION, SOLUTION INTRAVENOUS
Status: DISCONTINUED | OUTPATIENT
Start: 2025-03-10 | End: 2025-03-10 | Stop reason: HOSPADM

## 2025-03-10 RX ORDER — SODIUM CHLORIDE 9 MG/ML
INJECTION, SOLUTION INTRAVENOUS CONTINUOUS PRN
Status: DISCONTINUED | OUTPATIENT
Start: 2025-03-10 | End: 2025-03-10 | Stop reason: SURG

## 2025-03-10 RX ORDER — SODIUM CHLORIDE 9 MG/ML
20 INJECTION, SOLUTION INTRAVENOUS CONTINUOUS
Status: DISCONTINUED | OUTPATIENT
Start: 2025-03-10 | End: 2025-03-10 | Stop reason: HOSPADM

## 2025-03-10 RX ADMIN — TRANEXAMIC ACID 1000 MG: 10 INJECTION, SOLUTION INTRAVENOUS at 08:51

## 2025-03-10 RX ADMIN — OXYCODONE 10 MG: 5 TABLET ORAL at 10:22

## 2025-03-10 RX ADMIN — CEFAZOLIN 2000 MG: 2 INJECTION, POWDER, FOR SOLUTION INTRAMUSCULAR; INTRAVENOUS at 07:31

## 2025-03-10 RX ADMIN — PROPOFOL 100 MCG/KG/MIN: 10 INJECTION, EMULSION INTRAVENOUS at 07:45

## 2025-03-10 RX ADMIN — MELOXICAM 15 MG: 15 TABLET ORAL at 06:55

## 2025-03-10 RX ADMIN — PROPOFOL 40 MG: 10 INJECTION, EMULSION INTRAVENOUS at 08:36

## 2025-03-10 RX ADMIN — FENTANYL CITRATE 15 MCG: 50 INJECTION, SOLUTION INTRAMUSCULAR; INTRAVENOUS at 07:18

## 2025-03-10 RX ADMIN — ACETAMINOPHEN 1000 MG: 500 TABLET, FILM COATED ORAL at 06:55

## 2025-03-10 RX ADMIN — DEXAMETHASONE SODIUM PHOSPHATE 20 MG: 4 INJECTION, SOLUTION INTRAMUSCULAR; INTRAVENOUS at 07:09

## 2025-03-10 RX ADMIN — DEXAMETHASONE SODIUM PHOSPHATE 4 MG: 4 INJECTION, SOLUTION INTRAMUSCULAR; INTRAVENOUS at 07:59

## 2025-03-10 RX ADMIN — TRANEXAMIC ACID 1000 MG: 10 INJECTION, SOLUTION INTRAVENOUS at 07:48

## 2025-03-10 RX ADMIN — ROPIVACAINE HYDROCHLORIDE 50 ML: 5 INJECTION EPIDURAL; INFILTRATION; PERINEURAL at 07:27

## 2025-03-10 RX ADMIN — SODIUM CHLORIDE 20 ML/HR: 9 INJECTION, SOLUTION INTRAVENOUS at 07:00

## 2025-03-10 RX ADMIN — DEXMEDETOMIDINE HYDROCHLORIDE 40 MCG: 100 INJECTION, SOLUTION INTRAVENOUS at 07:25

## 2025-03-10 RX ADMIN — FENTANYL CITRATE 85 MCG: 50 INJECTION, SOLUTION INTRAMUSCULAR; INTRAVENOUS at 07:18

## 2025-03-10 RX ADMIN — BUPIVACAINE HYDROCHLORIDE IN DEXTROSE 0.8 ML: 7.5 INJECTION, SOLUTION SUBARACHNOID at 07:18

## 2025-03-10 RX ADMIN — PROPOFOL 50 MG: 10 INJECTION, EMULSION INTRAVENOUS at 07:46

## 2025-03-10 RX ADMIN — SODIUM CHLORIDE: 9 INJECTION, SOLUTION INTRAVENOUS at 07:43

## 2025-03-10 RX ADMIN — ONDANSETRON 4 MG: 2 INJECTION INTRAMUSCULAR; INTRAVENOUS at 07:59

## 2025-03-10 RX ADMIN — PHENYLEPHRINE HYDROCHLORIDE 0.5 MCG/KG/MIN: 10 INJECTION INTRAVENOUS at 07:52

## 2025-03-10 NOTE — DISCHARGE SUMMARY
Patient: Jens Jean-Baptiste      YOB: 1960    Medical Record Number: [id number]    Attending Physician: Michael Orantes MD  Consulting Physician(s):   Date of Admission: 3/10/2025  5:43 AM  Date of Discharge:       Patient Active Problem List   Diagnosis    Abnormal cardiovascular stress test    Cardiomyopathy    Congenital heart disease    Hyperlipidemia    Hypertension    Irritable bowel syndrome with diarrhea    Chronic low back pain    Complete rotator cuff tear or rupture of left shoulder, not specified as traumatic    Degeneration of intervertebral disc of lumbar region    Hemorrhoids    Transposition of great vessels    Vitreous degeneration, right    Nausea vomiting and diarrhea    Gastroenteritis due to norovirus    Chronic combined systolic and diastolic congestive heart failure, NYHA class 2    Carotidynia    Anomalous coronary artery origin    Arthritis    Congestive heart failure    Coronary-myocardial bridge    Osteoarthritis of left acromioclavicular joint    Osteoarthritis of left knee    Osteoarthritis of right knee    Rotator cuff syndrome    Degeneration of lumbar intervertebral disc    Transposition great arteries    COVID-19 virus infection    Bronchitis with bronchospasm    External thrombosed hemorrhoids     Status Post: RT uni compartmental replacement      Allergies   Allergen Reactions    Hydrocodone Urinary Retention       Current Medications:     Discharge Medications        New Medications        Instructions Start Date   aspirin 81 MG EC tablet   81 mg, Oral, 2 Times Daily      celecoxib 100 MG capsule  Commonly known as: CeleBREX   100 mg, Oral, 2 Times Daily      gabapentin 100 MG capsule  Commonly known as: NEURONTIN   100 mg, Oral, 3 Times Daily PRN      traMADol 50 MG tablet  Commonly known as: ULTRAM   50 mg, Oral, Every 6 Hours PRN             Continue These Medications        Instructions Start Date   carvedilol 6.25 MG tablet  Commonly known as: COREG   3.125  mg, 2 Times Daily With Meals      cholecalciferol 25 MCG (1000 UT) tablet  Commonly known as: VITAMIN D3   2 Times Daily      Entresto 49-51 MG tablet  Generic drug: sacubitril-valsartan   1 tablet, 2 Times Daily      spironolactone 25 MG tablet  Commonly known as: ALDACTONE   12.5 mg, Daily             Stop These Medications      ibuprofen 800 MG tablet  Commonly known as: ADVIL,MOTRIN     triamcinolone 0.1 % ointment  Commonly known as: KENALOG                  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    CHF (congestive heart failure)     Hyperlipidemia     Nausea vomiting and diarrhea 12/16/2019     Past Surgical History:   Procedure Laterality Date    CARDIAC CATHETERIZATION  2017    CARDIAC DEFIBRILLATOR PLACEMENT N/A     pacemaker/defibrillator    COLONOSCOPY N/A 10/28/2021    Procedure: COLONOSCOPY;  Surgeon: AURELIA Bird MD;  Location: [facility] ENDOSCOPY;  Service: Gastroenterology;  Laterality: N/A;  diverticulosis      ENDOSCOPY N/A 10/28/2021    Procedure: ESOPHAGOGASTRODUODENOSCOPY with biopsy x2 areas and dilitation (#54 bougie);  Surgeon: AURELIA Bird MD;  Location: [facility] ENDOSCOPY;  Service: Gastroenterology;  Laterality: N/A;  erosive esophagitis;erosive gastritis esophageal  stricture    UMBILICAL HERNIA REPAIR       Social History     Occupational History    Not on file   Tobacco Use    Smoking status: Never    Smokeless tobacco: Never   Vaping Use    Vaping status: Never Used   Substance and Sexual Activity    Alcohol use: Not Currently     Alcohol/week: 3.0 standard drinks of alcohol     Types: 3 Cans of beer per week     Comment: Patient states 2-3 at one time, once a week during the summer only    Drug use: Never    Sexual activity: Defer      Social History     Social History Narrative    Not on file     Family History   Problem Relation Age of Onset    Dementia Mother     Cancer Father          Physical Exam: 64 y.o. male  General Appearance:    Alert,  "cooperative, in no acute distress                      Vitals:    03/10/25 0600   BP: 124/67   BP Location: Left arm   Patient Position: Lying   Pulse: 62   Resp: 17   Temp: 97.8 °F (36.6 °C)   TempSrc: Oral   SpO2: 98%   Weight: 87.4 kg (192 lb 9.6 oz)   Height: 170.2 cm (67\")        Head:    Normocephalic, without obvious abnormality, atraumatic   Eyes:            Lids and lashes normal, conjunctivae and sclerae normal, no   icterus, no pallor, corneas clear, PERRLA   Ears:    Ears appear intact with no abnormalities noted   Throat:   No oral lesions, no thrush, oral mucosa moist   Neck:   No adenopathy, supple, trachea midline, no thyromegaly, no    carotid bruit, no JVD   Back:     No kyphosis present, no scoliosis present, no skin lesions,       erythema or scars, no tenderness to percussion or                   palpation,   range of motion normal   Lungs:     Clear to auscultation,respirations regular, even and                   unlabored    Heart:    Regular rhythm and normal rate, normal S1 and S2, no            murmur, no gallop, no rub, no click   Chest Wall:    No abnormalities observed   Abdomen:     Normal bowel sounds, no masses, no organomegaly, soft        non-tender, non-distended, no guarding, no rebound                 tenderness   Rectal:     Deferred   Extremities:   Incision intact without signs or symptoms of infection.               Neurovascular status remains intact to operative extremity.      Moves all extremities well, no edema, no cyanosis, no              redness   Pulses:   Pulses palpable and equal bilaterally   Skin:   No bleeding, bruising or rash   Lymph nodes:   No palpable adenopathy   Neurologic:   Cranial nerves 2 - 12 grossly intact, sensation intact, DTR        present and equal bilaterally           Hospital Course:  64 y.o. male admitted to [facility] to services of Michael Orantes MD with right medial Osteoarthritis [M19.90] on 3/10/2025 and underwent AR ARTHRP " KNEE CONDYLE&PLATEAU MEDIAL/LAT CMPRT [01604] (KNEE ARTHROPLASTY UNICOMPARTMENTAL)  Per Michael Orantes MD. Antibiotic and VTE prophylaxis were per SCIP protocols. Post-operatively the patient transferred to the post-operative floor where the patient underwent mobilization therapy that included active as well as passive ROM exercises. Opioids were titrated to achieve appropriate pain management to allow for participation in mobilization exercises. Vital signs are now stable. The incision is intact without signs or symptoms of infection. Operative extremity neurovascular status remains intact.   Appropriate education re: incision care, activity levels, medications, and follow up visits was completed and all questions were answered. The patient is now deemed stable for discharge to Home.      DIAGNOSTIC TESTS:     No visits with results within 2 Day(s) from this visit.   Latest known visit with results is:   Hospital Outpatient Visit on 02/26/2025   Component Date Value Ref Range Status    QT Interval 02/26/2025 434  ms Final    QTC Interval 02/26/2025 438  ms Final       No results found.    Discharge and Follow up Instructions:     Follow up in 2 weeks or sooner if needed.    Date: 3/10/2025    AUGUSTO Tam

## 2025-03-10 NOTE — H&P
"   History & Physical       Patient: Jens Jean-Baptiste    Proposed Surgery and date: Procedure(s) (LRB):  KNEE ARTHROPLASTY UNICOMPARTMENTAL (Right)     YOB: 1960    Medical Record Number: [id number]  Wt Readings from Last 3 Encounters:   03/10/25 87.4 kg (192 lb 9.6 oz)   02/26/25 87.5 kg (193 lb)   12/30/24 93 kg (205 lb)     Ht Readings from Last 3 Encounters:   03/10/25 170.2 cm (67\")   02/26/25 170.2 cm (67\")   12/30/24 175.3 cm (69\")     Body mass index is 30.17 kg/m².  Facility age limit for growth %estiven is 20 years.      Surgeon:  Dr. Michael Orantes M.D.        Chief Complaints: Pain    History of Present Illness: 64 y.o. male presents with right knee osteoarthritis. Onset of symptoms was years ago and has been progressively worsening despite more conservative treatment measures.  Symptoms are associated with ability to move, exercise, and perform activities of daily living.  Symptoms are aggravated by weight bearing and ROM necessary for activities of daily living.   Symptoms improve with rest, ice and elevation only minimally.      Allergies:   Allergies   Allergen Reactions    Hydrocodone Urinary Retention       Medications:   Home Medications:  No current facility-administered medications on file prior to encounter.     Current Outpatient Medications on File Prior to Encounter   Medication Sig    carvedilol (COREG) 6.25 MG tablet Take 0.5 tablets by mouth 2 (Two) Times a Day With Meals.    Cholecalciferol 25 MCG (1000 UT) tablet Take  by mouth 2 (Two) Times a Day.    ENTRESTO 49-51 MG tablet Take 1 tablet by mouth 2 (Two) Times a Day.    spironolactone (ALDACTONE) 25 MG tablet Take 0.5 tablets by mouth Daily.    ibuprofen (ADVIL,MOTRIN) 800 MG tablet Take 1 tablet by mouth Every 8 (Eight) Hours As Needed for Mild Pain for up to 15 doses.    triamcinolone (KENALOG) 0.1 % ointment Apply 1 Application topically to the appropriate area as directed 2 (Two) Times a Day.     Current " Medications:  Scheduled Meds:ceFAZolin, 2,000 mg, Intravenous, Once  Tranexamic Acid-NaCl, 1,000 mg, Intravenous, BID      Continuous Infusions:sodium chloride, 20 mL/hr, Last Rate: 20 mL/hr (03/10/25 0700)      PRN Meds:.  lidocaine PF 1%    sodium chloride    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    CHF (congestive heart failure)     Hyperlipidemia     Nausea vomiting and diarrhea 12/16/2019        Past Surgical History:   Procedure Laterality Date    CARDIAC CATHETERIZATION  2017    CARDIAC DEFIBRILLATOR PLACEMENT N/A     pacemaker/defibrillator    COLONOSCOPY N/A 10/28/2021    Procedure: COLONOSCOPY;  Surgeon: AURELIA Bird MD;  Location: [facility] ENDOSCOPY;  Service: Gastroenterology;  Laterality: N/A;  diverticulosis      ENDOSCOPY N/A 10/28/2021    Procedure: ESOPHAGOGASTRODUODENOSCOPY with biopsy x2 areas and dilitation (#54 bougie);  Surgeon: AURELIA Bird MD;  Location: [facility] ENDOSCOPY;  Service: Gastroenterology;  Laterality: N/A;  erosive esophagitis;erosive gastritis esophageal  stricture    UMBILICAL HERNIA REPAIR          Social History     Occupational History    Not on file   Tobacco Use    Smoking status: Never    Smokeless tobacco: Never   Vaping Use    Vaping status: Never Used   Substance and Sexual Activity    Alcohol use: Not Currently     Alcohol/week: 3.0 standard drinks of alcohol     Types: 3 Cans of beer per week     Comment: Patient states 2-3 at one time, once a week during the summer only    Drug use: Never    Sexual activity: Defer      Social History     Social History Narrative    Not on file        Family History   Problem Relation Age of Onset    Dementia Mother     Cancer Father          Review of Systems: 14 point review of systems was performed and was negative except as documented in the history of present illness.      Physical Exam: 64 y.o. male    Vital signs reviewed.    General Appearance:    Alert, cooperative, in no acute  distress                  General: alert, oriented  Eyes: conjunctiva clear  ENT: external ears and nose atraumatic  CV: no peripheral edema  Resp: normal respiratory effort  Skin: no rashes or wounds; normal turgor  Psych: mood and affect appropriate  Lymph: no nodes appreciated  Neuro: gross sensation intact  Vascular:  Palpable peripheral pulse in noted extremity  Musculoskeletal Extremities:  tenderness over operative extremity. Moves all extremities well, no to minimal LE edema, no cyanosis, no redness            Diagnostic Tests:  Lab Results (last 7 days)       ** No results found for the last 168 hours. **            Radiology images/reports reviewed      Assessment: Right knee osteoarthritis    Plan:  We will plan on proceeding with surgery at patient's request. Dr. Orantes has reviewed details of procedure with patient today and discussed risks, benefits, alternatives, and limitations of the procedure in laymen's terms with the risks including but not limited to: Allergy to medication, allergy to implant, device recall, leg length discrepancy, dislocation,  need to stop the surgery early or change to a different procedure, stiffness requiring revision surgeries neurovascular damage, foot drop, bleeding, infection, chronic pain, worsening of pain, chondrolysis, recurrent problem,  swelling, loss of motion, weakness, stiffness, instability, DVT, pulmonary embolus, death, stroke, complex regional pain syndrome, and need for additional procedures.  No guarantees were given regarding results of surgery.     Patient was given the opportunity to ask and have all questions answered today.  The patient voiced understanding of the risks, benefits, and alternative forms of treatment that were discussed and the patient consents to proceed with surgery.     Discharge Plan: Home with f/u in with Dr. Orantes  Date: 3/10/2025  Michael Orantes MD

## 2025-03-10 NOTE — DISCHARGE INSTRUCTIONS
"..Weight bearing as tolerated with your walker.  Get up and walk with your walker every 1 to 1.5 hours during the day.  When resting, elevate legs and use ice, as needed.    Continue diet as at home.    Dr. Orantes Post-Op Knee Incision Care  MARCIANO  You have a MARCIANO wound dressing in place.  This was placed under sterile conditions in the operating room.  It remains in place until your follow-up appointment.  The drain will operate with slight suction for 7 days.  After 7 days, it will stop working automatically.  At that time, remove the batteries from the battery pack and then discard the battery pack in the trash.  Cover the end of the tubing with plastic wrap and tape it to the dressing.  Keep it this way until your follow-up appointment.  Showering is ok after surgery.  Pause the battery pack (push the large orange button) and unscrew the battery pack from the tubing.  Place plastic wrap or tape over the end of the tubing.  Shower, attempting to keep the dressing out of the stream of water.  When finished, gently pat the dressing and tubing dry, remove plastic wrap or tape from end of tubing, and reattach tubing to the battery pack.  Then press the large orange button.  You will feel the battery pack vibrate until it achieves suction.  Then it will flash green over the \"ok\" button and run as usual.           "

## 2025-03-10 NOTE — ANESTHESIA PROCEDURE NOTES
Spinal Block      Patient reassessed immediately prior to procedure    Patient location during procedure: pre-op  Start Time: 3/10/2025 7:18 AM  Stop Time: 3/10/2025 7:24 AM  Indication:at surgeon's request, post-op pain management and procedure for pain  Performed By  Anesthesiologist: Ashley Jolly MD  Preanesthetic Checklist  Completed: patient identified, IV checked, site marked, risks and benefits discussed, surgical consent, monitors and equipment checked, pre-op evaluation and timeout performed  Spinal Block Prep:  Patient Position:sitting  Sterile Tech:cap, gloves and sterile barriers  Prep:Chloraprep  Patient Monitoring:EKG, continuous pulse oximetry and blood pressure monitoring    Spinal Block Procedure  Approach:midline  Guidance:landmark technique and palpation technique  Location:L3-L4  Needle Type:Hafsa  Needle Gauge:25 G  Placement of Spinal needle event:cerebrospinal fluid aspirated  Paresthesia: no  Fluid Appearance:clear  Medications: bupivacaine in dextrose (MARCAINE SPINAL) 0.75-8.25 % injection - Intrathecal   0.8 mL - 3/10/2025 7:18:00 AM  fentaNYL Citrate (PF) (SUBLIMAZE) injection - Intrathecal   15 mcg - 3/10/2025 7:18:00 AM   Post Assessment  Patient Tolerance:patient tolerated the procedure well with no apparent complications  Complications no

## 2025-03-10 NOTE — ANESTHESIA POSTPROCEDURE EVALUATION
Patient: Jens JOHNSON Estiven    Procedure Summary       Date: 03/10/25 Room / Location: [facility] OR 03 / [facility] MAIN OR    Anesthesia Start: 0742 Anesthesia Stop: 0915    Procedure: KNEE ARTHROPLASTY UNICOMPARTMENTAL (Right: Knee) Diagnosis:       Osteoarthritis      (Osteoarthritis [M19.90])    Surgeons: Michael Orantes MD Provider: Natividad Rothman CRNA    Anesthesia Type: spinal, regional ASA Status: 3            Anesthesia Type: spinal, regional    Vitals  Vitals Value Taken Time   /57 03/10/25 09:35   Temp 97.5 °F (36.4 °C) 03/10/25 09:35   Pulse 60 03/10/25 09:38   Resp 14 03/10/25 09:35   SpO2 94 % 03/10/25 09:37   Vitals shown include unfiled device data.        Post Anesthesia Care and Evaluation    Patient location during evaluation: PACU  Patient participation: complete - patient participated  Level of consciousness: awake and alert  Pain management: satisfactory to patient    Airway patency: patent  Anesthetic complications: No anesthetic complications  PONV Status: none  Cardiovascular status: acceptable  Respiratory status: acceptable  Hydration status: acceptable

## 2025-03-10 NOTE — THERAPY EVALUATION
Patient Name: Jens Jean-Baptiste  : 1960    MRN: [id number]                              Today's Date: 3/10/2025       Admit Date: 3/10/2025    Visit Dx:     ICD-10-CM ICD-9-CM   1. Status post right unicompartmental knee replacement  Z96.651 V43.65     Patient Active Problem List   Diagnosis    Abnormal cardiovascular stress test    Cardiomyopathy    Congenital heart disease    Hyperlipidemia    Hypertension    Irritable bowel syndrome with diarrhea    Chronic low back pain    Complete rotator cuff tear or rupture of left shoulder, not specified as traumatic    Degeneration of intervertebral disc of lumbar region    Hemorrhoids    Transposition of great vessels    Vitreous degeneration, right    Nausea vomiting and diarrhea    Gastroenteritis due to norovirus    Chronic combined systolic and diastolic congestive heart failure, NYHA class 2    Carotidynia    Anomalous coronary artery origin    Arthritis    Congestive heart failure    Coronary-myocardial bridge    Osteoarthritis of left acromioclavicular joint    Osteoarthritis of left knee    Osteoarthritis of right knee    Rotator cuff syndrome    Degeneration of lumbar intervertebral disc    Transposition great arteries    COVID-19 virus infection    Bronchitis with bronchospasm    External thrombosed hemorrhoids     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    CHF (congestive heart failure)     Hyperlipidemia     Nausea vomiting and diarrhea 2019     Past Surgical History:   Procedure Laterality Date    CARDIAC CATHETERIZATION      CARDIAC DEFIBRILLATOR PLACEMENT N/A     pacemaker/defibrillator    COLONOSCOPY N/A 10/28/2021    Procedure: COLONOSCOPY;  Surgeon: AURELIA Bird MD;  Location: [facility] ENDOSCOPY;  Service: Gastroenterology;  Laterality: N/A;  diverticulosis      ENDOSCOPY N/A 10/28/2021    Procedure: ESOPHAGOGASTRODUODENOSCOPY with biopsy x2 areas and dilitation (#54 bougie);  Surgeon: AURELIA Bird,  MD;  Location: [facility] ENDOSCOPY;  Service: Gastroenterology;  Laterality: N/A;  erosive esophagitis;erosive gastritis esophageal  stricture    UMBILICAL HERNIA REPAIR        General Information       Row Name 03/10/25 1342          Physical Therapy Time and Intention    Document Type evaluation  -     Mode of Treatment physical therapy  -       Row Name 03/10/25 1342          General Information    Patient Profile Reviewed yes  -AH     Prior Level of Function independent:;all household mobility;community mobility;driving  -     Existing Precautions/Restrictions fall  -     Barriers to Rehab none identified  -       Row Name 03/10/25 1342          Living Environment    Current Living Arrangements home  -     People in Home spouse  -       Row Name 03/10/25 1342          Home Main Entrance    Number of Stairs, Main Entrance none  -       Row Name 03/10/25 1342          Stairs Within Home, Primary    Stair Railings, Within Home, Primary none  -       Row Name 03/10/25 1342          Cognition    Orientation Status (Cognition) oriented x 4  -               User Key  (r) = Recorded By, (t) = Taken By, (c) = Cosigned By      Initials Name Provider Type     Hayde Nielson PT Physical Therapist                   Mobility       Row Name 03/10/25 1350          Bed Mobility    Bed Mobility bed mobility (all) activities  -     All Activities, [place] (Bed Mobility) modified independence  -       Row Name 03/10/25 1350          Sit-Stand Transfer    Sit-Stand [place] (Transfers) standby assist  -     Assistive Device (Sit-Stand Transfers) walker, front-wheeled  -       Row Name 03/10/25 1350          Gait/Stairs (Locomotion)    [place] Level (Gait) standby assist  -     Assistive Device (Gait) walker, front-wheeled  -     Patient was able to Ambulate yes  -     Distance in Feet (Gait) 40  -AH     Deviations/Abnormal Patterns (Gait) right sided deviations;weight shifting  decreased;antalgic  -               User Key  (r) = Recorded By, (t) = Taken By, (c) = Cosigned By      Initials Name Provider Type     Hayde Nielson, TEMITOPE Physical Therapist                   Obj/Interventions       [facility] Name 03/10/25 [facility]          Range of Motion Comprehensive    Comment, General Range of Motion R knee 0-100 degrees AROM  -AH       Row Name 03/10/25 [facility]          Strength Comprehensive (MMT)    Comment, General Manual Muscle Testing (MMT) Assessment RLE with good quad set. BLE WFL.  -[facility] Name 03/10/25 [facility]          Motor Skills    Motor Skills functional endurance  -     Functional Endurance FAIR (+)  -[facility] 03/10/25 [facility]          Balance    Balance Assessment sitting static balance;sitting dynamic balance;standing static balance;standing dynamic balance  -     Static Sitting Balance standby assist  -     Dynamic Sitting Balance standby assist  -     Position, Sitting Balance sitting edge of bed  -     Static Standing Balance contact guard  -     Dynamic Standing Balance contact guard  -     Position/Device Used, Standing Balance walker, front-wheeled;supported  -AH       Row Name 03/10/25 [facility]          Sensory Assessment (Somatosensory)    Sensory Assessment (Somatosensory) sensation intact  -               User Key  (r) = Recorded By, (t) = Taken By, (c) = Cosigned By      Initials Name Provider Type     Hayde Nielson, TEMITOPE Physical Therapist                   Goals/Plan    No documentation.                  Clinical Impression       Row Name 03/10/25 [facility]          Pain    Pretreatment Pain Rating 0/10 - no pain  -     Posttreatment Pain Rating 0/10 - no pain  -     Pain Location knee  -     Pain Side/Orientation right  -AH       Row Name 03/10/25 [facility]          Plan of Care Review    Plan of Care Reviewed With patient;spouse  -     Outcome Evaluation Pt is a 65 y/o male who presented 3/10/25 for elective R TKR due to OA. Surgery was completed by  Dr. Orantes. Pt lives with his spouse in a [facility] with no ERYN. He is usually independent with all mobility and ADLs. During PT evaluation pt completes all moblity with CGA/SBA including gait x40'. RW used for standing activities. PT adjusted pt's walker height, placed glide caps, and corrected wheel position (moved them from rear posts to front posts). pt with a strong R quad set and no buckling noted during standing activities. PT reviewed post op exercises from handout. Also emphasized need for ice/elevation, frequent mobility, AROM/stretching, and knee extension position during resting/sleeping. Pt has no additional acute care PT needs. Safe to DC home with spouse. PT recommends f/u with outpatient therapy.  -       Row Name 03/10/25 9288          Therapy Assessment/Plan (PT)    Patient/Family Therapy Goals Statement (PT) --  -     Rehab Potential (PT) good  -     Criteria for Skilled Interventions Met (PT) yes  -     Therapy Frequency (PT) evaluation only  -       Row Name 03/10/25 4066          Positioning and Restraints    Pre-Treatment Position in bed  -     Post Treatment Position other  sitting  at end of stretcher preparing to leave.  -               User Key  (r) = Recorded By, (t) = Taken By, (c) = Cosigned By      Initials Name Provider Type     Hayde Nielson, PT Physical Therapist                   Outcome Measures       Row Name 03/10/25 0852          How much help from another person do you currently need...    Turning from your back to your side while in flat bed without using bedrails? 4  -AH     Moving from lying on back to sitting on the side of a flat bed without bedrails? 4  -AH     Moving to and from a bed to a chair (including a wheelchair)? 4  -AH     Standing up from a chair using your arms (e.g., wheelchair, bedside chair)? 4  -AH     Climbing 3-5 steps with a railing? 3  -AH     To walk in hospital room? 3  -AH     AM-PAC 6 Clicks Score (PT) 22  -     Highest Level of  Mobility Goal 7 --> Walk 25 feet or more  -       Row Name 03/10/25 1406          Functional Assessment    Outcome Measure Options AM-PAC 6 Clicks Basic Mobility (PT)  -               User Key  (r) = Recorded By, (t) = Taken By, (c) = Cosigned By      Initials Name Provider Type     Hayde Nielson PT Physical Therapist                                 Physical Therapy Education       Title: PT OT SLP Therapies (Done)       Topic: Physical Therapy (Done)       Point: Mobility training (Done)       Learning Progress Summary            Patient Acceptance, E, VU by  at 3/10/2025 1406   Family Acceptance, E, VU by  at 3/10/2025 1406                      Point: Home exercise program (Done)       Learning Progress Summary            Patient Acceptance, E, VU by  at 3/10/2025 1406   Family Acceptance, E, VU by  at 3/10/2025 1406                      Point: Body mechanics (Done)       Learning Progress Summary            Patient Acceptance, E, VU by  at 3/10/2025 1406   Family Acceptance, E, VU by  at 3/10/2025 1406                      Point: Precautions (Done)       Learning Progress Summary            Patient Acceptance, E, VU by  at 3/10/2025 1406   Family Acceptance, E, VU by  at 3/10/2025 1406                                      User Key       Initials Effective Dates Name Provider Type [facility] 12/04/23 -  Hayde Nielson PT Physical Therapist PT                  PT Recommendation and Plan     Outcome Evaluation: Pt is a 65 y/o male who presented 3/10/25 for elective R TKR due to OA. Surgery was completed by Dr. Orantes. Pt lives with his spouse in a [facility] with no ERYN. He is usually independent with all mobility and ADLs. During PT evaluation pt completes all moblity with CGA/SBA including gait x40'. RW used for standing activities. PT adjusted pt's walker height, placed glide caps, and corrected wheel position (moved them from rear posts to front posts). pt with a strong R quad set and no  buckling noted during standing activities. PT reviewed post op exercises from handout. Also emphasized need for ice/elevation, frequent mobility, AROM/stretching, and knee extension position during resting/sleeping. Pt has no additional acute care PT needs. Safe to DC home with spouse. PT recommends f/u with outpatient therapy.     Time Calculation:         PT Charges       Row Name 03/10/25 1409             Time Calculation    Start Time 1230  -      Stop Time 1254  -      Time Calculation (min) 24 min  -      PT Non-Billable Time (min) 0 min  -      PT Received On 03/10/25  -         Time Calculation- PT    Total Timed Code Minutes- PT 0 minute(s)  -                User Key  (r) = Recorded By, (t) = Taken By, (c) = Cosigned By      Initials Name Provider Type     Hayde Nielson PT Physical Therapist                  Therapy Charges for Today       Code Description Service Date Service Provider Modifiers Qty    33203292141 HC PT EVAL LOW COMPLEXITY 4 3/10/2025 Hayde Nielson, PT GP 1            PT G-Codes  Outcome Measure Options: AM-PAC 6 Clicks Basic Mobility (PT)  AM-PAC 6 Clicks Score (PT): 22  PT Discharge Summary  Anticipated Discharge Disposition (PT): home with assist, home with outpatient therapy services    Hayde Nielson PT  3/10/2025

## 2025-03-10 NOTE — OP NOTE
Name: Jens Jean-Baptiste  YOB: 1960    DATE OF SURGERY: 3/10/2025    PREOPERATIVE DIAGNOSIS: Right knee end-stage osteoarthritis    POSTOPERATIVE DIAGNOSIS: Right knee end-stage osteoarthritis    PROCEDURE PERFORMED: Right unicompartmental knee replacement    SURGEON: Michael Orantes M.D.    ASSISTANT: NICK DENISE JESSE D    IMPLANTS: Medacta Kirsty size 3 femur size 4 tibia size 8 polyethylene insert  Estimated Blood Loss: 20  Specimens : none  Complications: none       DESCRIPTION OF PROCEDURE: The patient was taken to the operating room and placed in the supine position. A sequential compression device was carefully placed on the non-operative leg. Preoperative antibiotics were administered. Surgical time out was performed. After adequate induction of anesthesia, the leg was prepped and draped in the usual sterile fashion. Tourniquet applied.  A midline incision was performed followed by a medial parapatellar arthrotomy.  Inspection of the joint including ACL, PCL, lateral compartment, patellofemoral compartment confirmed the patient was a good candidate for partial knee arthroplasty.  Dissection carried around the very proximal portion of the medial tibial plateau was performed.  A small portion of the fat pad was removed for visualization.  The sagittal saw was used to make the initial tibial cut.  Next, the tibial cut guide was utilized to finish the tibial bone cut.  The tibial cut bone was removed and spacer blocks were used to check flexion-extension gaps.  Adequate removal was confirmed  Next attention was placed to the distal femur which was cut using a guide and cut block.  Spacer blocks were again used as well as a drop wan to ensure acceptable alignment.  Next the femur was sized and marked and the final finishing femoral cuts were made.  Spacers were again used to ensure adequate bone removal.  Next the tibia was then finished.  Trial implants were placed.  The knee was  taken through full range of motion and found to have full extension with appropriate tension as well as a full deep knee flexion with appropriate tension.  Trial components were removed once the lug drills were performed.  The knee was copiously irrigated.  The final implants were cemented into place.  The knee was injected with a multimodal pain cocktail under direct visualization.   The knee was held still until the cement had completely hardened. We then placed the trial polyethylene spacer which resulted in full extension and excellent flexion-extension balance.  The knee was inspected to make sure all cement was removed from the back of the knee.  We placed the final polyethylene spacer. The knee was then copiously irrigated. Hemostasis was achieved.  We closed the knee in multiple layers in standard fashion. Sterile dressing were applied. At the end of the case, the sponge and needle counts were reported as being correct. There were no known complications. The patient was then transported to the recovery room.             Michael Orantes M.D.Assistant: Vanesa Alba APRN; Jimbo Schuster APRN  was responsible for performing the following activities: Retraction, Suction, Irrigation, Suturing, Closing, and Placing Dressing and their skilled assistance was necessary for the success of this case.        Michael Orantes M.D.

## 2025-03-10 NOTE — ANESTHESIA PREPROCEDURE EVALUATION
Anesthesia Evaluation     Patient summary reviewed   no history of anesthetic complications:   NPO Solid Status: > 8 hours  NPO Liquid Status: > 8 hours           Airway   Mallampati: II  TM distance: >3 FB  No difficulty expected  Dental      Pulmonary - negative pulmonary ROS   Cardiovascular     (+) hypertension, CAD, CHF Systolic <55%, hyperlipidemia,  carotid artery disease    ROS comment: Interpretation  Technically difficult study.  The ventricle on the left side with tricuspid valve is dilated with severe LV  dysfunction with EF of 15-20%  There is mild-to-moderate regurgitation on the left-sided valve  The ventricle on the right side with the mitral valve is normal in size and  good contractility and EF of 55-60%.  aortic valve is not well-visualized.  No pericardial effusion noted      Neuro/Psych- negative ROS  (-) CVA  GI/Hepatic/Renal/Endo      Musculoskeletal     Abdominal    Substance History      OB/GYN          Other   arthritis,                   Anesthesia Plan    ASA 3     spinal and regional   total IV anesthesia  (Low EF, very compensated, will do Low dose spinal, may need vasopressor infusion during procedure)  intravenous induction     Anesthetic plan, risks, benefits, and alternatives have been provided, discussed and informed consent has been obtained with: patient.    Plan discussed with CRNA.

## 2025-03-10 NOTE — DISCHARGE INSTR - APPOINTMENTS
Physical Therapy   [facility]  [address] [address]  [phone]    Therapy to begin Monday, March 17

## 2025-03-10 NOTE — PLAN OF CARE
Goal Outcome Evaluation:  Plan of Care Reviewed With: patient, spouse     Problem: Adult Inpatient Plan of Care  Goal: Plan of Care Review  Recent Flowsheet Documentation  Taken 3/10/2025 1354 by Hayde Nielson, PT  Outcome Evaluation: Pt is a 63 y/o male who presented 3/10/25 for elective R TKR due to OA. Surgery was completed by Dr. Orantes. Pt lives with his spouse in a [facility] with no ERYN. He is usually independent with all mobility and ADLs. During PT evaluation pt completes all moblity with CGA/SBA including gait x40'. RW used for standing activities. PT adjusted pt's walker height, placed glide caps, and corrected wheel position (moved them from rear posts to front posts). pt with a strong R quad set and no buckling noted during standing activities. PT reviewed post op exercises from handout. Also emphasized need for ice/elevation, frequent mobility, AROM/stretching, and knee extension position during resting/sleeping. Pt has no additional acute care PT needs. Safe to DC home with spouse. PT recommends f/u with outpatient therapy.  Plan of Care Reviewed With:   patient   spouse                Anticipated Discharge Disposition (PT): home with assist, home with outpatient therapy services

## 2025-03-10 NOTE — ANESTHESIA PROCEDURE NOTES
Peripheral Block      Patient reassessed immediately prior to procedure    Patient location during procedure: pre-op  Start time: 3/10/2025 7:24 AM  Stop time: 3/10/2025 7:27 AM  Reason for block: at surgeon's request and post-op pain management  Performed by  Anesthesiologist: Ashley Jolly MD  Preanesthetic Checklist  Completed: patient identified, IV checked, site marked, risks and benefits discussed, surgical consent, monitors and equipment checked, pre-op evaluation and timeout performed  Prep:  Pt Position: supine  Sterile barriers:alcohol skin prep, cap, gloves, mask and washed/disinfected hands  Prep: ChloraPrep  Patient monitoring: blood pressure monitoring, continuous pulse oximetry and EKG  Procedure    Sedation: yes  Performed under: local infiltration  Guidance:ultrasound guided    ULTRASOUND INTERPRETATION.  Using ultrasound guidance a 20 G gauge needle was placed in close proximity to the nerve, at which point, under ultrasound guidance anesthetic was injected in the area of the nerve and spread of the anesthesia was seen on ultrasound in close proximity thereto.  There were no abnormalities seen on ultrasound; a digital image was taken; and the patient tolerated the procedure with no complications. Images:still images obtained, printed/placed on chart    Laterality:right  Block Type:adductor canal block and iPack  Injection Technique:single-shot  Needle Type:echogenic  Needle Gauge:20 G    Sedation medications used: fentaNYL citrate (PF) (SUBLIMAZE) injection - Intravenous   85 mcg - 3/10/2025 7:18:00 AM  Medications Used: dexmedetomidine HCl (PRECEDEX) injection - Perineural   40 mcg - 3/10/2025 7:25:00 AM  ropivacaine (NAROPIN) 0.5 % injection - Perineural   50 mL - 3/10/2025 7:27:00 AM      Post Assessment  Injection Assessment: negative aspiration for heme, no paresthesia on injection and incremental injection  Patient Tolerance:comfortable throughout block  Complications:no  Additional  Notes  Skin anesthetized with 1% Lidocaine.  Using 20 G, 4 inch stimuplex echogenic needle,  20 mL Local was injected with serial aspirations under continuous ultrasound guidance into adductor canal and 30 ,mL for iPACK.  No heme aspirated.  Needle tip visualized throughout.  Good spread noted.  No complications.  No bleeding noted.  Performed by: Ashley Jolly MD

## 2025-03-26 ENCOUNTER — OFFICE VISIT (OUTPATIENT)
Dept: FAMILY MEDICINE CLINIC | Facility: CLINIC | Age: 65
End: 2025-03-26
Payer: MEDICARE

## 2025-03-26 VITALS
WEIGHT: 193.2 LBS | BODY MASS INDEX: 30.26 KG/M2 | SYSTOLIC BLOOD PRESSURE: 118 MMHG | HEART RATE: 74 BPM | DIASTOLIC BLOOD PRESSURE: 66 MMHG | OXYGEN SATURATION: 98 % | RESPIRATION RATE: 18 BRPM

## 2025-03-26 DIAGNOSIS — R05.9 COUGH IN ADULT: ICD-10-CM

## 2025-03-26 DIAGNOSIS — R09.89 CHEST CONGESTION: ICD-10-CM

## 2025-03-26 DIAGNOSIS — J40 BRONCHITIS: Primary | ICD-10-CM

## 2025-03-26 PROBLEM — I50.20 SYSTOLIC HEART FAILURE: Status: ACTIVE | Noted: 2023-11-13

## 2025-03-26 PROBLEM — Z95.810 BIVENTRICULAR ICD (IMPLANTABLE CARDIOVERTER-DEFIBRILLATOR) IN PLACE: Status: ACTIVE | Noted: 2023-05-03

## 2025-03-26 PROBLEM — I44.7 LBBB (LEFT BUNDLE BRANCH BLOCK): Status: ACTIVE | Noted: 2023-05-03

## 2025-03-26 RX ORDER — PREDNISONE 20 MG/1
20 TABLET ORAL DAILY
Qty: 10 TABLET | Refills: 0 | Status: SHIPPED | OUTPATIENT
Start: 2025-03-26

## 2025-03-26 RX ORDER — TRANEXAMIC ACID 650 MG/1
TABLET ORAL
COMMUNITY
Start: 2025-03-11

## 2025-03-26 RX ORDER — DOXYCYCLINE 100 MG/1
100 CAPSULE ORAL 2 TIMES DAILY
Qty: 10 CAPSULE | Refills: 0 | Status: SHIPPED | OUTPATIENT
Start: 2025-03-26

## 2025-03-26 NOTE — PATIENT INSTRUCTIONS
Bronchitis  - Prednisone 40 mg daily for 5 days (steroid)  - Doxycycline 100 mg twice daily for 5 days (antibiotic)  - Albuterol as needed (inhaler)  - Stay well hydrated, get plenty of rest  - Symptomatic treatment including over the counter nasal decongestant, cough suppressant, Tylenol as needed  - Hold on imaging for now  - Follow up if new/worsening symptoms     Follow up with ortho as scheduled

## 2025-03-26 NOTE — PROGRESS NOTES
Chief Complaint  Cough, URI, and Fatigue    HPI:    Jens Jean-Baptiste presents to [facility]    Patient is a 64-year-old male with a history of hypertension, hyperlipidemia, coronary artery disease, CHF, transposition of the great vessels, anomalous coronary artery, irritable bowel syndrome, chronic back pain presenting for evaluation of upper respiratory symptoms.    Patient reports that he has been having ongoing symptoms for the past two weeks. He initially had some cough but was not bringing up anything. He continues to feel cough, fatigue, congestion. Denies  runny nose,  sore throat, sinus pain/pressure, ear pain pressure, lymphadenopathy, headache, or shortness of breath. Denies fever, chills, nausea, vomiting. Denies recent sick contact or travel other than wife and being recently admitted to hospital for knee replacement.  He has been trying over-the-counter medications without significant improvement. Denies history of asthma, bronchitis, recurrent pneumonia, or tobacco use.  Patient did get flu and COVID vaccines this year.     Patient recently admitted to [facility] on 3/10/2025 for right unicompartmental replacement by Dr. Orantes.  Patient tolerated procedure well without postop complications.  Patient discharged on aspirin, Celebrex, gabapentin, and tramadol.  Patient recommended follow-up with Dr. Orantes as an outpatient in 2 weeks.     Review of Systems:  ROS negative unless otherwise noted in HPI above.    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    CHF (congestive heart failure)     Hyperlipidemia     Nausea vomiting and diarrhea 12/16/2019         Current Outpatient Medications:     aspirin 81 MG EC tablet, Take 1 tablet by mouth 2 (Two) Times a Day for 30 days., Disp: 60 tablet, Rfl: 0    carvedilol (COREG) 6.25 MG tablet, Take 0.5 tablets by mouth 2 (Two) Times a Day With Meals., Disp: , Rfl:     celecoxib (CeleBREX) 100 MG  "capsule, Take 1 capsule by mouth 2 (Two) Times a Day., Disp: 60 capsule, Rfl: 0    Cholecalciferol 25 MCG (1000 UT) tablet, Take  by mouth 2 (Two) Times a Day., Disp: , Rfl:     ENTRESTO 49-51 MG tablet, Take 1 tablet by mouth 2 (Two) Times a Day., Disp: , Rfl:     gabapentin (NEURONTIN) 100 MG capsule, Take 1 capsule by mouth 3 (Three) Times a Day As Needed (nerve pain)., Disp: 30 capsule, Rfl: 0    spironolactone (ALDACTONE) 25 MG tablet, Take 0.5 tablets by mouth Daily., Disp: , Rfl:     traMADol (ULTRAM) 50 MG tablet, Take 1 tablet by mouth Every 6 (Six) Hours As Needed for Moderate Pain., Disp: 28 tablet, Rfl: 0    Tranexamic Acid 650 MG tablet, , Disp: , Rfl:     Social History     Socioeconomic History    Marital status:    Tobacco Use    Smoking status: Never    Smokeless tobacco: Never   Vaping Use    Vaping status: Never Used   Substance and Sexual Activity    Alcohol use: Not Currently     Alcohol/week: 3.0 standard drinks of alcohol     Types: 3 Cans of beer per week     Comment: Patient states 2-3 at one time, once a week during the summer only    Drug use: Never    Sexual activity: Defer        Objective   Vital Signs:  /66   Pulse 74   Resp 18   Wt 87.6 kg (193 lb 3.2 oz)   SpO2 98%   BMI 30.26 kg/m²   Estimated body mass index is 30.26 kg/m² as calculated from the following:    Height as of 3/10/25: 170.2 cm (67\").    Weight as of this encounter: 87.6 kg (193 lb 3.2 oz).    Physical Exam:  General: Well-appearing patient, no apparent distress  HEENT: No posterior pharynx erythema, clear postnasal drip no tonsillar erythema or exudates, normal external auditory canals, TM normal without bulging or erythema, mild bilateral middle ear effusions  Cardiac: Regular rate and rhythm, normal S1/S2, no murmur, rubs or gallops, no lower extremity edema  Lungs: Faint crackles in the bases bilaterally; no crackles  Skin: No significant rashes or lesions  MSK: Grossly normal tone and " strength  Neuro: Alert and oriented x3, CN II-XII grossly intact  Psych: Appropriate mood and affect    Assessment and Plan:    (J40) Bronchitis  Assessment: Patient continues to have persistent symptoms after approximately 2 weeks. Most likely initially viral in nature based on timing, exposure, and symptoms, with suspicion of flu or COVID despite recent negative test.  Concern for possible superimposed bacterial infection given duration and persistence of symptoms. Discussed symptomatic treatment, typical clinical course of illness, as well as signs and symptoms which would prompt reevaluation and consideration of possible imaging and additional treatment.  Plan:  - Prednisone 40 mg daily for 5 days (steroid)  - Doxycycline 100 mg twice daily for 5 days (antibiotic)  - Albuterol as needed (inhaler)  - Stay well hydrated, get plenty of rest  - Symptomatic treatment including over the counter nasal decongestant, cough suppressant, Tylenol as needed  - Hold on imaging for now  - Follow up if new/worsening symptoms     Patient was given instructions and counseling regarding his condition or for health maintenance advice. Please see specific information pulled into the AVS if appropriate.       Dr Kev Gutierrez   Internal Medicine Physician  [facility]  [address], [address]  [place] [address]

## 2025-04-08 ENCOUNTER — OFFICE VISIT (OUTPATIENT)
Dept: CARDIOLOGY | Facility: CLINIC | Age: 65
End: 2025-04-08
Payer: MEDICARE

## 2025-04-08 VITALS
SYSTOLIC BLOOD PRESSURE: 98 MMHG | BODY MASS INDEX: 29.1 KG/M2 | OXYGEN SATURATION: 95 % | HEIGHT: 69 IN | HEART RATE: 63 BPM | WEIGHT: 196.5 LBS | DIASTOLIC BLOOD PRESSURE: 63 MMHG

## 2025-04-08 DIAGNOSIS — I10 PRIMARY HYPERTENSION: ICD-10-CM

## 2025-04-08 DIAGNOSIS — Q20.3: Primary | ICD-10-CM

## 2025-04-08 DIAGNOSIS — Z95.810 ICD (IMPLANTABLE CARDIOVERTER-DEFIBRILLATOR), DUAL, IN SITU: ICD-10-CM

## 2025-04-08 DIAGNOSIS — I50.22 CHRONIC HFREF (HEART FAILURE WITH REDUCED EJECTION FRACTION): ICD-10-CM

## 2025-04-08 NOTE — PROGRESS NOTES
"    Subjective:     Encounter Date:04/08/2025      Patient ID: Jens Jean-Baptiste is a 64 y.o. male.    Chief Complaint:  History of Present Illness 64-year-old white male with history of complete transposition of great arteries history of HFrEF hypertension ICD placement the past presents to my office for a follow-up.  Patient is currently stable without any symptoms of chest pain or shortness of breath at rest or exertion.  No grandmas any PND orthopnea.  No palpitations dizziness syncope or swelling of the feet.  Patient is taking all the medicines regularly.  Patient does not smoke.    The following portions of the patient's history were reviewed and updated as appropriate: allergies, current medications, past family history, past medical history, past social history, past surgical history, and problem list.  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    CHF (congestive heart failure)     Hyperlipidemia     Nausea vomiting and diarrhea 12/16/2019     Past Surgical History:   Procedure Laterality Date    CARDIAC CATHETERIZATION  2017    CARDIAC DEFIBRILLATOR PLACEMENT N/A     pacemaker/defibrillator    COLONOSCOPY N/A 10/28/2021    Procedure: COLONOSCOPY;  Surgeon: AURELIA Bird MD;  Location: [facility] ENDOSCOPY;  Service: Gastroenterology;  Laterality: N/A;  diverticulosis      ENDOSCOPY N/A 10/28/2021    Procedure: ESOPHAGOGASTRODUODENOSCOPY with biopsy x2 areas and dilitation (#54 bougie);  Surgeon: AURELIA Bird MD;  Location: [facility] ENDOSCOPY;  Service: Gastroenterology;  Laterality: N/A;  erosive esophagitis;erosive gastritis esophageal  stricture    KNEE ARTHROPLASTY UNICOMPARTMENTAL Right 3/10/2025    Procedure: KNEE ARTHROPLASTY UNICOMPARTMENTAL;  Surgeon: Michael Orantes MD;  Location: [facility] MAIN OR;  Service: Orthopedics;  Laterality: Right;    UMBILICAL HERNIA REPAIR       BP 98/63   Pulse 63   Ht 175.3 cm (69\")   Wt 89.1 kg (196 lb 8 oz)   SpO2 95%   BMI " 29.02 kg/m²   Family History   Problem Relation Age of Onset    Dementia Mother     Cancer Father        Current Outpatient Medications:     aspirin 81 MG EC tablet, Take 1 tablet by mouth 2 (Two) Times a Day for 30 days., Disp: 60 tablet, Rfl: 0    carvedilol (COREG) 6.25 MG tablet, Take 0.5 tablets by mouth 2 (Two) Times a Day With Meals., Disp: , Rfl:     celecoxib (CeleBREX) 100 MG capsule, Take 1 capsule by mouth 2 (Two) Times a Day., Disp: 60 capsule, Rfl: 0    Cholecalciferol 25 MCG (1000 UT) tablet, Take  by mouth 2 (Two) Times a Day., Disp: , Rfl:     doxycycline (VIBRAMYCIN) 100 MG capsule, Take 1 capsule by mouth 2 (Two) Times a Day., Disp: 10 capsule, Rfl: 0    ENTRESTO 49-51 MG tablet, Take 1 tablet by mouth 2 (Two) Times a Day., Disp: , Rfl:     gabapentin (NEURONTIN) 100 MG capsule, Take 1 capsule by mouth 3 (Three) Times a Day As Needed (nerve pain)., Disp: 30 capsule, Rfl: 0    predniSONE (DELTASONE) 20 MG tablet, Take 1 tablet by mouth Daily., Disp: 10 tablet, Rfl: 0    spironolactone (ALDACTONE) 25 MG tablet, Take 0.5 tablets by mouth Daily., Disp: , Rfl:     traMADol (ULTRAM) 50 MG tablet, Take 1 tablet by mouth Every 6 (Six) Hours As Needed for Moderate Pain., Disp: 28 tablet, Rfl: 0    Tranexamic Acid 650 MG tablet, , Disp: , Rfl:   Allergies   Allergen Reactions    Hydrocodone Urinary Retention     Social History     Socioeconomic History    Marital status:    Tobacco Use    Smoking status: Never    Smokeless tobacco: Never   Vaping Use    Vaping status: Never Used   Substance and Sexual Activity    Alcohol use: Not Currently     Alcohol/week: 3.0 standard drinks of alcohol     Types: 3 Cans of beer per week     Comment: Patient states 2-3 at one time, once a week during the summer only    Drug use: Never    Sexual activity: Defer     Review of Systems   Constitutional: Negative for malaise/fatigue.   Cardiovascular:  Negative for chest pain, dyspnea on exertion, leg swelling and  palpitations.   Respiratory:  Negative for cough and shortness of breath.    Gastrointestinal:  Negative for abdominal pain, nausea and vomiting.   Neurological:  Negative for dizziness, focal weakness, headaches, light-headedness and numbness.   All other systems reviewed and are negative.             Objective:     Constitutional:       Appearance: Well-developed.   Eyes:      General: No scleral icterus.     Conjunctiva/sclera: Conjunctivae normal.   HENT:      Head: Normocephalic and atraumatic.   Neck:      Vascular: No carotid bruit or JVD.   Pulmonary:      Effort: Pulmonary effort is normal.      Breath sounds: Normal breath sounds. No wheezing. No rales.   Cardiovascular:      Normal rate. Regular rhythm.   Pulses:     Intact distal pulses.   Abdominal:      General: Bowel sounds are normal.      Palpations: Abdomen is soft.   Musculoskeletal:      Cervical back: Normal range of motion and neck supple. Skin:     General: Skin is warm and dry.      Findings: No rash.   Neurological:      Mental Status: Alert.       Procedures    Lab Review:         MDM    #1 complete transposition of great arteries  Patient had congenitally corrected transposition of great vessels and has LV systolic dysfunction and is followed by the transplant team at [facility].  2.  HFrEF  Patient has severe LV systolic dysfunction and hence he is on medical therapy with beta-blockers and Entresto and also has an ICD.  3.  Coronary disease  Patient had LAD originating from the right cusp with significant myocardial bridging and is managed medically and is also seen by the cardiac team there.  4.  Hypertension  Patient blood pressure currently stable on carvedilol and Entresto.    Patient's previous medical records, labs, and EKG were reviewed and discussed with the patient at today's visit.

## 2025-04-29 ENCOUNTER — OFFICE VISIT (OUTPATIENT)
Dept: ORTHOPEDIC SURGERY | Facility: CLINIC | Age: 65
End: 2025-04-29
Payer: MEDICARE

## 2025-04-29 VITALS — HEIGHT: 69 IN | BODY MASS INDEX: 29.03 KG/M2 | OXYGEN SATURATION: 97 % | WEIGHT: 196 LBS

## 2025-04-29 DIAGNOSIS — G89.29 CHRONIC RIGHT SHOULDER PAIN: Primary | ICD-10-CM

## 2025-04-29 DIAGNOSIS — M75.102 TEAR OF LEFT SUPRASPINATUS TENDON: ICD-10-CM

## 2025-04-29 DIAGNOSIS — G89.29 CHRONIC LEFT SHOULDER PAIN: ICD-10-CM

## 2025-04-29 DIAGNOSIS — M25.512 CHRONIC LEFT SHOULDER PAIN: ICD-10-CM

## 2025-04-29 DIAGNOSIS — M25.511 CHRONIC RIGHT SHOULDER PAIN: Primary | ICD-10-CM

## 2025-04-29 RX ADMIN — TRIAMCINOLONE ACETONIDE 80 MG: 40 INJECTION, SUSPENSION INTRA-ARTICULAR; INTRAMUSCULAR at 10:25

## 2025-04-29 RX ADMIN — LIDOCAINE HYDROCHLORIDE 2 ML: 10 INJECTION, SOLUTION EPIDURAL; INFILTRATION; INTRACAUDAL; PERINEURAL at 10:26

## 2025-04-29 RX ADMIN — LIDOCAINE HYDROCHLORIDE 2 ML: 10 INJECTION, SOLUTION EPIDURAL; INFILTRATION; INTRACAUDAL; PERINEURAL at 10:25

## 2025-04-29 RX ADMIN — TRIAMCINOLONE ACETONIDE 80 MG: 40 INJECTION, SUSPENSION INTRA-ARTICULAR; INTRAMUSCULAR at 10:26

## 2025-04-29 NOTE — PROGRESS NOTES
"   Patient ID: Jens Jean-Baptiste is a 64 y.o. male.  History of Present Illness  The patient presents for a follow-up on right shoulder pain.    He was last evaluated on 12/30/2024 for an AC joint injury secondary to a fall on 12/07/2024, where he landed on his right side. A subacromial steroid injection was administered to the right shoulder, which provided relief. He reports that he can perform all activities without significant pain, but experiences discomfort at night, particularly when lying on his back. He is interested in receiving additional injections as they have been beneficial in the past.    He recalls two falls on his left shoulder, after which he sought medical attention. He was informed that one tendon was too far displaced to reattach and the other two were torn. He has been managing these issues conservatively. He used to go to [facility] and was under the care of Dr. Georges who was administering injections in his left shoulder and knee for approximately 3 years.    His knee condition deteriorated to the point where the injections were no longer effective, leading to a recommendation for partial knee replacement surgery by Dr. Jaramillo, which was performed 6 weeks ago. He has discontinued the use of Celebrex and Ultram following his knee surgery.    PAST SURGICAL HISTORY:  Partial knee replacement on the right knee 6 weeks ago.    SOCIAL HISTORY  Marital Status:     Objective:  Ht 175.3 cm (69\")   Wt 88.9 kg (196 lb)   SpO2 97%   BMI 28.94 kg/m²     Physical Examination:     Bilateral shoulders:  Physical Exam  Musculoskeletal:  Right Shoulder: Visual inspection demonstrates intact skin. No apparent atrophy. No signs of effusion. Right radial pulses 1+. Capillary refill less than 1 second to all digits.  strength 5/5. Range of motion at the right digits, right wrist and right elbow are grossly intact. Passive forward flexion 155. Abduction 150. External rotation passively 60. " Active internal rotation T10. Belly press 5/5, pain free. Lift off 5/5, pain free. Negative Speed. Negative Ramirze. Negative scarf. No pain, no weakness with [place]. Pain and trace weakness with supraspinatus/Nancy.  Left Shoulder: Visual inspection demonstrates mild to moderate atrophy along the posterior aspect of the shoulder. Otherwise intact skin. No scars. No signs of effusion. No signs of infection. No tenderness to palpation. Passive forward flexion 140 with stiffness. Passive abduction 145. Passive external rotation 60. Active internal rotation T9. Belly press 4/5 with pain. Lift off 2/5. Negative Speed. Significant weakness with [place] and supraspinatus/Nancy. Negative Ramirez. Positive scarf from the left.  Right Elbow: Range of motion grossly intact.  Left Elbow: Range of motion grossly intact.  Right Wrist: Range of motion grossly intact.  Left Wrist: Range of motion grossly intact.  Right Hand/Wrist:  strength 5/5. Capillary refill less than 1 second to all digits.  Left Hand/Wrist:  strength 5/5. Capillary refill less than 1 second to all digits.    Imaging:     MRI Shoulder Left Without Contrast (11/09/2017 08:32)   DISCUSSION:  Rotator cuff:     There is a complete full-thickness tear of the supraspinatus tendon from the  insertion site.  This is likely chronic given at least moderate muscle  atrophy.  The musculotendinous junction is retracted medially by 2 cm.           There is a complete full-thickness tear of the infraspinatus tendon.  This is  contiguous with the supraspinatus tendon tear.  There is some edema at the  musculotendinous junction, so acute on chronic component may be present.  There is mild muscle atrophy.  There is medial retraction musculotendinous  junction by about 1.2 cm.      The subscapularis tendon has a full-thickness tear of the superior muscular  fibers.  This is likely chonic.  There is severe muscle atrophy.  It is a  large tendon defect as it measures  approximately 2 cm from craniocaudal by 2  cm from medial-lateral.      The infraspinatus tendon and muscle are intact.      Glenohumeral joint:  The humeral head is superiorly located.  It is also  anteriorly is subluxed.  There is a moderate size glenohumeral joint effusion.  There is mild arthritis of glenohumeral joint.  Subchondral cysts are seen at  the greater tuberosity at the infraspinatus and supraspinatus tendon insertion  site.  There is degenerative tearing of the labrum.  The inferior joint  capsule has normal thickness and signal intensity.      Long head biceps tendon: The long head biceps tendon is abnormal.  There is  probable interstitial tearing along its more distal visualized portion.  It is  not intact along its proximal extent is very thin and only a few intact fibers  are seen inserting on the labrum.  It is displaced from the bicipital groove  posteriorly.      Acromioclavicular joint:  Normal in appearance for patient's age.  No  significant marrow edema.  No os acromiale.      Miscellaneous:  Deltoid muscles normal size and signal intensity.  No  pathologically enlarged axillary lymph nodes.  Small amount subacromial  subdeltoid bursal fluid.  Bone marrow signal intensity is normal.  No  fractures or contusions.     IMPRESSION:  1. There are findings of rotator cuff tear.  An acute on chronic tear  suspected at the infraspinatus tendon.  There is a complete full-thickness  tear of this tendon from the insertion site with mild muscle atrophy and with  edema at the musculotendinous junction.  There is a complete full-thickness  likely chronic tear of the supraspinatus tendon with medial retraction  musculotendinous junction at least moderate muscle atrophy.  There is a  chronic appearing tear full-thickness of the superior fibers subscapularis  tendon with severe muscle atrophy.  2. There is mild arthritis of the glenohumeral joint.  There is a moderate  size glenohumeral joint  effusion.  3. The long head biceps tendon has interstitial tearing and it is posteriorly  located.  A few tiny intact fibers are seen inserting on the labrum.  4. Small amount subacromial subdeltoid bursal fluid.  5. No osseous fractures.    Assessment:    Diagnoses and all orders for this visit:    1. Chronic right shoulder pain (Primary)    2. Chronic left shoulder pain  -     XR Shoulder 2+ View Left    3. Tear of left supraspinatus tendon     Plan:   Assessment & Plan  1. Right shoulder pain:  Subacromial steroid injection provided some relief, but pain persists, especially at night, disrupting sleep. Significant weakness with [place] and supraspinatus/Nancy tests, pain with passive forward flexion and abduction.    A repeat steroid injection will be administered to manage the pain. An x-ray of the right shoulder will be ordered to further evaluate the condition.    2. Left shoulder pain:  History of falls resulting in tendon injuries that were not surgically repaired. Mild to moderate atrophy along the posterior aspect of the shoulder, significant weakness and pain during certain movements. Positive scar from the left.     An x-ray of the left shoulder will be ordered to assess the current state of the shoulder. A steroid injection will be administered to alleviate the pain.    Follow-up:  An x-ray will be performed, and then an injection will be administered.    PROCEDURE  Steroid injection was administered in the right shoulder today.  Risk and benefits of this and steroid injection were discussed and patient wishes to proceed.    Large Joint Arthrocentesis: R subacromial bursa  Date/Time: 4/29/2025 10:25 AM  Consent given by: patient  Site marked: site marked  Timeout: Immediately prior to procedure a time out was called to verify the correct patient, procedure, equipment, support staff and site/side marked as required   Supporting Documentation  Indications: pain   Procedure Details  Location: shoulder - R  subacromial bursa  Preparation: Patient was prepped and draped in the usual sterile fashion  Needle size: 25 G  Approach: posterior  Medications administered: 80 mg triamcinolone acetonide 40 MG/ML; 2 mL lidocaine PF 1% 1 %  Patient tolerance: patient tolerated the procedure well with no immediate complications      Large Joint Arthrocentesis: L subacromial bursa  Date/Time: 4/29/2025 10:26 AM  Consent given by: patient  Site marked: site marked  Timeout: Immediately prior to procedure a time out was called to verify the correct patient, procedure, equipment, support staff and site/side marked as required   Supporting Documentation  Indications: pain   Procedure Details  Location: shoulder - L subacromial bursa  Preparation: Patient was prepped and draped in the usual sterile fashion  Needle size: 25 G  Approach: posterior  Medications administered: 80 mg triamcinolone acetonide 40 MG/ML; 2 mL lidocaine PF 1% 1 %  Patient tolerance: patient tolerated the procedure well with no immediate complications         Patient or patient representative verbalized consent for the use of Ambient Listening during the visit with  Mason Garcia PA-C for chart documentation. 4/29/2025  11:09 EDT    Disclaimer: Part of this note may be an electronic transcription/translation of spoken language to printed text using the Dragon Dictation System

## 2025-04-30 RX ORDER — TRIAMCINOLONE ACETONIDE 40 MG/ML
80 INJECTION, SUSPENSION INTRA-ARTICULAR; INTRAMUSCULAR
Status: COMPLETED | OUTPATIENT
Start: 2025-04-29 | End: 2025-04-29

## 2025-04-30 RX ORDER — LIDOCAINE HYDROCHLORIDE 10 MG/ML
2 INJECTION, SOLUTION EPIDURAL; INFILTRATION; INTRACAUDAL; PERINEURAL
Status: COMPLETED | OUTPATIENT
Start: 2025-04-29 | End: 2025-04-29

## 2025-06-05 ENCOUNTER — OFFICE VISIT (OUTPATIENT)
Dept: FAMILY MEDICINE CLINIC | Facility: CLINIC | Age: 65
End: 2025-06-05
Payer: MEDICARE

## 2025-06-05 VITALS
HEART RATE: 64 BPM | OXYGEN SATURATION: 96 % | BODY MASS INDEX: 29.25 KG/M2 | RESPIRATION RATE: 18 BRPM | DIASTOLIC BLOOD PRESSURE: 64 MMHG | HEIGHT: 69 IN | SYSTOLIC BLOOD PRESSURE: 109 MMHG | WEIGHT: 197.5 LBS

## 2025-06-05 DIAGNOSIS — M72.2 PLANTAR FASCIITIS OF RIGHT FOOT: Primary | ICD-10-CM

## 2025-06-05 PROCEDURE — 3074F SYST BP LT 130 MM HG: CPT | Performed by: INTERNAL MEDICINE

## 2025-06-05 PROCEDURE — 1125F AMNT PAIN NOTED PAIN PRSNT: CPT | Performed by: INTERNAL MEDICINE

## 2025-06-05 PROCEDURE — 99213 OFFICE O/P EST LOW 20 MIN: CPT | Performed by: INTERNAL MEDICINE

## 2025-06-05 PROCEDURE — 3078F DIAST BP <80 MM HG: CPT | Performed by: INTERNAL MEDICINE

## 2025-06-05 NOTE — PATIENT INSTRUCTIONS
Plantar fasciitis    - Tylenol, Advil as needed    - Activity modification     - Ice as needed    - Encourage wearing supportive shoes    - Plantar fasciitis exercises (can do online search)    - Could consider PT, podiatry referral if not improving    - Follow up as needed

## 2025-06-05 NOTE — PROGRESS NOTES
Chief Complaint  Foot Pain (Bottom of feet hurt)    HPI:    Jens Jean-Baptiste presents to [facility]    Patient is a 64-year-old male with a history of hypertension, hyperlipidemia CAD, systolic heart failure, transposition of the great arteries, left bundle branch block, ICD placement, irritable bowel syndrome, osteoarthritis presenting for evaluation of pain of the right foot.     Patient started with acute onset of pain of the bottom of the right foot that came on about 2-3 days ago. Pain described as constant, non-radiating, throbbing pain.   Pain worse when he gets up in the morning or when he has been inactive. Pain better with activity. Patient has been trying OTC medications, activity modifications, heat/ice, voltaren gel. Denies massage, stretching, PT. Denies fever, chills, nausea, vomiting. Denies numbness, tingling, weakness, saddle anesthesia, urinary/fecal incontinence, focal sensory/motor deficit. Denies recent trauma, bending/twisting, injury, or overuse. He recently changed shoes and recently got some new tennis shoes about two weeks. Previously had similar symptoms in the left foot about two weeks ago that has since gotten better since.       Review of Systems:  ROS negative unless otherwise noted in HPI above.    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    CHF (congestive heart failure)     Hyperlipidemia     Nausea vomiting and diarrhea 12/16/2019         Current Outpatient Medications:     carvedilol (COREG) 6.25 MG tablet, Take 0.5 tablets by mouth 2 (Two) Times a Day With Meals., Disp: , Rfl:     ENTRESTO 49-51 MG tablet, Take 1 tablet by mouth 2 (Two) Times a Day., Disp: , Rfl:     gabapentin (NEURONTIN) 100 MG capsule, Take 1 capsule by mouth 3 (Three) Times a Day As Needed (nerve pain)., Disp: 30 capsule, Rfl: 0    spironolactone (ALDACTONE) 25 MG tablet, Take 0.5 tablets by mouth Daily., Disp: , Rfl:     Cholecalciferol 25 MCG  "(1000 UT) tablet, Take  by mouth 2 (Two) Times a Day. (Patient not taking: Reported on 6/5/2025), Disp: , Rfl:     doxycycline (VIBRAMYCIN) 100 MG capsule, Take 1 capsule by mouth 2 (Two) Times a Day. (Patient not taking: Reported on 6/5/2025), Disp: 10 capsule, Rfl: 0    predniSONE (DELTASONE) 20 MG tablet, Take 1 tablet by mouth Daily. (Patient not taking: Reported on 6/5/2025), Disp: 10 tablet, Rfl: 0    Tranexamic Acid 650 MG tablet, , Disp: , Rfl:     Social History     Socioeconomic History    Marital status:    Tobacco Use    Smoking status: Never    Smokeless tobacco: Never   Vaping Use    Vaping status: Never Used   Substance and Sexual Activity    Alcohol use: Not Currently     Alcohol/week: 3.0 standard drinks of alcohol     Types: 3 Cans of beer per week     Comment: Patient states 2-3 at one time, once a week during the summer only    Drug use: Never    Sexual activity: Defer        Objective   Vital Signs:  /64   Pulse 64   Resp 18   Ht 175.3 cm (69\")   Wt 89.6 kg (197 lb 8 oz)   SpO2 96%   BMI 29.17 kg/m²   Estimated body mass index is 29.17 kg/m² as calculated from the following:    Height as of this encounter: 175.3 cm (69\").    Weight as of this encounter: 89.6 kg (197 lb 8 oz).    Physical Exam:  General: Well-appearing patient, no apparent distress  Skin: No significant rashes or lesions  MSK: Grossly normal tone and strength, 5 out of 5 strength in upper and lower extremities bilaterally, tenderness to palpation of the right plantar fascia, especially near insertion site near the medial aspect of the right heel  Neuro: Alert and oriented x3, CN II-XII grossly intact, normal sensation in lower extremities bilaterally  Psych: Appropriate mood and affect    Assessment and Plan:    (M72.2) Plantar fasciitis of right foot  Assessment: Patient with several days of right plantar foot pain.  Exam with diffuse tenderness to palpation on the plantar aspect of the foot, especially near " plantar fascial insertion site near heel.  Symptoms may have recently been triggered by changing new footwear.  Discussed recommended treatment, possible home exercises, expected course, as well as signs and symptoms which should prompt reevaluation.  Plan:  - Tylenol, Advil as needed  - Activity modification   - Ice as needed  - Encourage wearing supportive shoes  - Plantar fasciitis exercises   - Could consider PT, podiatry referral if not improving  - Follow up as needed    Patient was given instructions and counseling regarding his condition or for health maintenance advice. Please see specific information pulled into the AVS if appropriate.       Dr Kev Gutierrez   Internal Medicine Physician  [facility]  [address], [address]  [place] [address]

## 2025-08-04 ENCOUNTER — OFFICE VISIT (OUTPATIENT)
Dept: ORTHOPEDIC SURGERY | Facility: CLINIC | Age: 65
End: 2025-08-04
Payer: MEDICARE

## 2025-08-04 VITALS — BODY MASS INDEX: 29.18 KG/M2 | WEIGHT: 197 LBS | HEIGHT: 69 IN | HEART RATE: 73 BPM | OXYGEN SATURATION: 96 %

## 2025-08-04 DIAGNOSIS — M75.102 TEAR OF LEFT SUPRASPINATUS TENDON: ICD-10-CM

## 2025-08-04 DIAGNOSIS — G89.29 CHRONIC LEFT SHOULDER PAIN: ICD-10-CM

## 2025-08-04 DIAGNOSIS — G89.29 CHRONIC RIGHT SHOULDER PAIN: Primary | ICD-10-CM

## 2025-08-04 DIAGNOSIS — M25.511 CHRONIC RIGHT SHOULDER PAIN: Primary | ICD-10-CM

## 2025-08-04 DIAGNOSIS — M25.512 CHRONIC LEFT SHOULDER PAIN: ICD-10-CM

## 2025-08-05 RX ADMIN — LIDOCAINE HYDROCHLORIDE 2 ML: 10 INJECTION, SOLUTION EPIDURAL; INFILTRATION; INTRACAUDAL; PERINEURAL at 08:41

## 2025-08-05 RX ADMIN — TRIAMCINOLONE ACETONIDE 80 MG: 40 INJECTION, SUSPENSION INTRA-ARTICULAR; INTRAMUSCULAR at 08:41

## 2025-08-05 RX ADMIN — LIDOCAINE HYDROCHLORIDE 2 ML: 10 INJECTION, SOLUTION EPIDURAL; INFILTRATION; INTRACAUDAL; PERINEURAL at 08:39

## 2025-08-05 RX ADMIN — TRIAMCINOLONE ACETONIDE 80 MG: 40 INJECTION, SUSPENSION INTRA-ARTICULAR; INTRAMUSCULAR at 08:39

## 2025-08-06 RX ORDER — TRIAMCINOLONE ACETONIDE 40 MG/ML
80 INJECTION, SUSPENSION INTRA-ARTICULAR; INTRAMUSCULAR
Status: COMPLETED | OUTPATIENT
Start: 2025-08-05 | End: 2025-08-05

## 2025-08-06 RX ORDER — LIDOCAINE HYDROCHLORIDE 10 MG/ML
2 INJECTION, SOLUTION EPIDURAL; INFILTRATION; INTRACAUDAL; PERINEURAL
Status: COMPLETED | OUTPATIENT
Start: 2025-08-05 | End: 2025-08-05

## (undated) DEVICE — BITEBLOCK ENDO W/STRAP 60F A/ LF DISP

## (undated) DEVICE — SINGLE-USE BIOPSY FORCEPS: Brand: RADIAL JAW 4

## (undated) DEVICE — PK ENDO GI 50

## (undated) DEVICE — PAPR PRNT PK SONY W RIBN UPC55